# Patient Record
Sex: MALE | Race: WHITE | NOT HISPANIC OR LATINO | Employment: FULL TIME | URBAN - METROPOLITAN AREA
[De-identification: names, ages, dates, MRNs, and addresses within clinical notes are randomized per-mention and may not be internally consistent; named-entity substitution may affect disease eponyms.]

---

## 2018-06-27 ENCOUNTER — OFFICE VISIT (OUTPATIENT)
Dept: URGENT CARE | Facility: PHYSICIAN GROUP | Age: 48
End: 2018-06-27
Payer: COMMERCIAL

## 2018-06-27 ENCOUNTER — APPOINTMENT (OUTPATIENT)
Dept: RADIOLOGY | Facility: IMAGING CENTER | Age: 48
End: 2018-06-27
Attending: PHYSICIAN ASSISTANT
Payer: COMMERCIAL

## 2018-06-27 VITALS
WEIGHT: 236 LBS | BODY MASS INDEX: 31.97 KG/M2 | HEIGHT: 72 IN | TEMPERATURE: 99 F | HEART RATE: 74 BPM | SYSTOLIC BLOOD PRESSURE: 130 MMHG | DIASTOLIC BLOOD PRESSURE: 72 MMHG | RESPIRATION RATE: 18 BRPM | OXYGEN SATURATION: 94 %

## 2018-06-27 DIAGNOSIS — J18.9 COMMUNITY ACQUIRED PNEUMONIA OF RIGHT MIDDLE LOBE OF LUNG: Primary | ICD-10-CM

## 2018-06-27 DIAGNOSIS — R50.9 FEVER, UNSPECIFIED FEVER CAUSE: ICD-10-CM

## 2018-06-27 DIAGNOSIS — R05.9 COUGH: ICD-10-CM

## 2018-06-27 PROCEDURE — 71046 X-RAY EXAM CHEST 2 VIEWS: CPT | Mod: TC | Performed by: PHYSICIAN ASSISTANT

## 2018-06-27 PROCEDURE — 99204 OFFICE O/P NEW MOD 45 MIN: CPT | Performed by: PHYSICIAN ASSISTANT

## 2018-06-27 RX ORDER — GUAIFENESIN AND DEXTROMETHORPHAN HYDROBROMIDE 1200; 60 MG/1; MG/1
TABLET, EXTENDED RELEASE ORAL
COMMUNITY
End: 2021-05-14

## 2018-06-27 RX ORDER — AZITHROMYCIN 250 MG/1
TABLET, FILM COATED ORAL
Qty: 1 QUANTITY SUFFICIENT | Refills: 0 | Status: SHIPPED | OUTPATIENT
Start: 2018-06-27 | End: 2018-07-05

## 2018-06-27 RX ORDER — AMOXICILLIN 500 MG/1
1000 CAPSULE ORAL 3 TIMES DAILY
Qty: 42 CAP | Refills: 0 | Status: SHIPPED | OUTPATIENT
Start: 2018-06-27 | End: 2018-07-04

## 2018-06-27 ASSESSMENT — ENCOUNTER SYMPTOMS: COUGH: 1

## 2018-06-27 NOTE — PROGRESS NOTES
Subjective:      Ross Blake is a 47 y.o. male who presents with Cough (congestion, X3 weeks )    PMH: Reviewed with patient/family member/EPIC.   MEDS:   Current Outpatient Prescriptions:   •  Dextromethorphan-Guaifenesin (MUCINEX DM MAXIMUM STRENGTH)  MG TABLET SR 12 HR, Take  by mouth., Disp: , Rfl:   ALLERGIES: No Known Allergies  SURGHX: No past surgical history on file.  SOCHX:  reports that he has never smoked. He has never used smokeless tobacco.  FH: Reviewed with patient/family. Not pertinent to this complaint.            Pt co cough x 3 weeks that started as a dry irritating cough, now becoming productive, keeping him awake at night.  Pt states the right side of his chest/back hurt, feels like he strained something coughing.       Cough   This is a new problem. Episode onset: 3 weeks. The problem has been gradually worsening. The problem occurs every few minutes. The cough is productive of sputum and productive of purulent sputum. Associated symptoms include chills, headaches and shortness of breath. Pertinent negatives include no fever, hemoptysis, myalgias, nasal congestion, postnasal drip, rhinorrhea, sore throat or wheezing. The symptoms are aggravated by lying down. He has tried OTC cough suppressant, rest and body position changes for the symptoms. The treatment provided no relief. There is no history of asthma, bronchitis, environmental allergies or pneumonia.       Review of Systems   Constitutional: Positive for chills and malaise/fatigue. Negative for fever.   HENT: Negative for postnasal drip, rhinorrhea and sore throat.    Respiratory: Positive for cough, sputum production and shortness of breath. Negative for hemoptysis and wheezing.    Musculoskeletal: Negative for myalgias.   Neurological: Positive for headaches. Negative for dizziness.   Endo/Heme/Allergies: Negative for environmental allergies.   All other systems reviewed and are negative.         Objective:     /72    Pulse 74   Temp 37.2 °C (99 °F)   Resp 18   Ht 1.829 m (6')   Wt 107 kg (236 lb)   SpO2 94%   BMI 32.01 kg/m²      Physical Exam   Constitutional: He is oriented to person, place, and time. He appears well-developed and well-nourished. No distress.   HENT:   Head: Normocephalic and atraumatic.   Eyes: EOM are normal. Pupils are equal, round, and reactive to light.   Neck: Normal range of motion. Neck supple.   Cardiovascular: Normal rate.    Pulmonary/Chest: He has decreased breath sounds in the right lower field. He has no wheezes. He has rhonchi. He has rales in the right lower field.   Abdominal: Soft.   Musculoskeletal: Normal range of motion.   Neurological: He is alert and oriented to person, place, and time.   Skin: Skin is warm and dry.   Psychiatric: He has a normal mood and affect.   Nursing note and vitals reviewed.          Xray images viewed and interpreted by me, confirmed by radiology:      Impression         Airspace opacity in the right middle lobe, in keeping with pneumonia.   Reading Provider Reading Date   Jose Daniel Whiteside M.D. Jun 27, 2018   Signing Provider Signing Date Signing Time   Jose Daniel Whiteside M.D. Jun 27, 2018  3:04 PM          Assessment/Plan:     1. Community acquired pneumonia of right middle lobe of lung (HCC)  DX-CHEST-2 VIEWS    amoxicillin (AMOXIL) 500 MG Cap    azithromycin (ZITHROMAX) 250 MG Tab   2. Cough  DX-CHEST-2 VIEWS    amoxicillin (AMOXIL) 500 MG Cap    azithromycin (ZITHROMAX) 250 MG Tab   3. Fever, unspecified fever cause  DX-CHEST-2 VIEWS    amoxicillin (AMOXIL) 500 MG Cap    azithromycin (ZITHROMAX) 250 MG Tab     PT can continue OTC medications, increase fluids and rest until symptoms improve. PT declined Rx cough medicine at this time.     PT should follow up with PCP in 1-2 days for re-evaluation if symptoms have not improved.      Discussed red flags and reasons to return to UC or ED.  Pt and/or family verbalized understanding of diagnosis and follow up  instructions and was offered informational handout on diagnosis.  PT discharged.

## 2018-06-30 ASSESSMENT — ENCOUNTER SYMPTOMS
CHILLS: 1
FEVER: 0
MYALGIAS: 0
HEADACHES: 1
SORE THROAT: 0
HEMOPTYSIS: 0
DIZZINESS: 0
RHINORRHEA: 0
WHEEZING: 0
SPUTUM PRODUCTION: 1
SHORTNESS OF BREATH: 1

## 2018-07-05 ENCOUNTER — APPOINTMENT (OUTPATIENT)
Dept: RADIOLOGY | Facility: IMAGING CENTER | Age: 48
End: 2018-07-05
Attending: PHYSICIAN ASSISTANT
Payer: COMMERCIAL

## 2018-07-05 ENCOUNTER — OFFICE VISIT (OUTPATIENT)
Dept: URGENT CARE | Facility: PHYSICIAN GROUP | Age: 48
End: 2018-07-05
Payer: COMMERCIAL

## 2018-07-05 VITALS
HEART RATE: 100 BPM | WEIGHT: 240 LBS | DIASTOLIC BLOOD PRESSURE: 82 MMHG | SYSTOLIC BLOOD PRESSURE: 118 MMHG | BODY MASS INDEX: 32.55 KG/M2 | OXYGEN SATURATION: 94 % | TEMPERATURE: 99.6 F

## 2018-07-05 DIAGNOSIS — E66.9 OBESITY (BMI 30-39.9): ICD-10-CM

## 2018-07-05 DIAGNOSIS — J18.9 PNEUMONIA OF RIGHT MIDDLE LOBE DUE TO INFECTIOUS ORGANISM: Primary | ICD-10-CM

## 2018-07-05 DIAGNOSIS — J18.9 PNEUMONIA OF RIGHT MIDDLE LOBE DUE TO INFECTIOUS ORGANISM: ICD-10-CM

## 2018-07-05 PROCEDURE — 94760 N-INVAS EAR/PLS OXIMETRY 1: CPT | Mod: 59 | Performed by: PHYSICIAN ASSISTANT

## 2018-07-05 PROCEDURE — 94640 AIRWAY INHALATION TREATMENT: CPT | Performed by: PHYSICIAN ASSISTANT

## 2018-07-05 PROCEDURE — 99214 OFFICE O/P EST MOD 30 MIN: CPT | Mod: 25 | Performed by: PHYSICIAN ASSISTANT

## 2018-07-05 PROCEDURE — 71046 X-RAY EXAM CHEST 2 VIEWS: CPT | Mod: TC | Performed by: PHYSICIAN ASSISTANT

## 2018-07-05 RX ORDER — LEVOFLOXACIN 500 MG/1
500 TABLET, FILM COATED ORAL DAILY
Qty: 10 TAB | Refills: 0 | Status: SHIPPED | OUTPATIENT
Start: 2018-07-05 | End: 2018-07-15

## 2018-07-05 RX ORDER — PROMETHAZINE HYDROCHLORIDE AND CODEINE PHOSPHATE 6.25; 1 MG/5ML; MG/5ML
5 SYRUP ORAL 4 TIMES DAILY PRN
Qty: 240 ML | Refills: 0 | Status: SHIPPED | OUTPATIENT
Start: 2018-07-05 | End: 2018-07-19

## 2018-07-05 RX ORDER — ALBUTEROL SULFATE 90 UG/1
2 AEROSOL, METERED RESPIRATORY (INHALATION) EVERY 6 HOURS PRN
Qty: 8.5 G | Refills: 0 | Status: SHIPPED | OUTPATIENT
Start: 2018-07-05 | End: 2018-07-15

## 2018-07-05 RX ORDER — IPRATROPIUM BROMIDE AND ALBUTEROL SULFATE 2.5; .5 MG/3ML; MG/3ML
3 SOLUTION RESPIRATORY (INHALATION) ONCE
Status: COMPLETED | OUTPATIENT
Start: 2018-07-05 | End: 2018-07-05

## 2018-07-05 RX ORDER — PREDNISONE 20 MG/1
TABLET ORAL
Qty: 23 TAB | Refills: 0 | Status: ON HOLD | OUTPATIENT
Start: 2018-07-05 | End: 2021-05-20

## 2018-07-05 RX ADMIN — IPRATROPIUM BROMIDE AND ALBUTEROL SULFATE 3 ML: 2.5; .5 SOLUTION RESPIRATORY (INHALATION) at 14:10

## 2018-07-05 NOTE — PROGRESS NOTES
Subjective:      Pt is a 47 y.o. male who presents with Pneumonia (dx of pneumonia x 06/27.. not feeling better, productive cough, painful cough )            HPI  PT presents to  clinic today, 8 days after being diagnosed with right middle lobe pneumonia and placed on a ZPACK. Pt still complaining of sore throat, pressure in ears, cough, fatigue, runny nose, wheezing and SOB. PT denies CP, NVD, abdominal pain, joint pain. PT states these symptoms began around 10 days ago. PT states the pain is a 7/10 from ocughing, aching in nature and worse at night.  The pt's medication list, problem list, and allergies have been evaluated and reviewed during today's visit.    PMH:  Negative per pt.      PSH:  Negative per pt.      Fam Hx:  the patient's family history is not pertinent to their current complaint    Soc HX:  Social History     Social History   • Marital status:      Spouse name: N/A   • Number of children: N/A   • Years of education: N/A     Occupational History   • Not on file.     Social History Main Topics   • Smoking status: Never Smoker   • Smokeless tobacco: Never Used   • Alcohol use Yes      Comment: occ   • Drug use: No   • Sexual activity: Not on file     Other Topics Concern   • Not on file     Social History Narrative   • No narrative on file         Medications:    Current Outpatient Prescriptions:   •  albuterol 108 (90 Base) MCG/ACT Aero Soln inhalation aerosol, Inhale 2 Puffs by mouth every 6 hours as needed for Shortness of Breath for up to 10 days., Disp: 8.5 g, Rfl: 0  •  levoFLOXacin (LEVAQUIN) 500 MG tablet, Take 1 Tab by mouth every day for 10 days., Disp: 10 Tab, Rfl: 0  •  predniSONE (DELTASONE) 20 MG Tab, Take 3 tabs at once PO daily x 5 days, then take 2 tabs at once daily x 3 days, then take 1 tab PO daily x 2 days, Disp: 23 Tab, Rfl: 0  •  promethazine-codeine (PHENERGAN-CODEINE) 6.25-10 MG/5ML Syrup, Take 5 mL by mouth 4 times a day as needed for Cough for up to 14 days., Disp:  240 mL, Rfl: 0  •  Dextromethorphan-Guaifenesin (MUCINEX DM MAXIMUM STRENGTH)  MG TABLET SR 12 HR, Take  by mouth., Disp: , Rfl:       Allergies:  Patient has no known allergies.    ROS    Review of Systems   Constitutional: Positive for chills and malaise/fatigue. Negative for fever and diaphoresis.   HENT: Positive for congestion, ear pain and sore throat. Negative for ear discharge, hearing loss, nosebleeds and tinnitus.    Eyes: Negative for blurred vision, double vision and photophobia.   Respiratory: Positive for cough, sputum production, shortness of breath and wheezing. Negative for hemoptysis.    Cardiovascular: Negative for chest pain and palpitations.   Gastrointestinal: Negative for nausea, vomiting, abdominal pain, diarrhea and constipation.   Genitourinary: Negative for dysuria and flank pain.   Musculoskeletal: Negative for joint pain and myalgias.   Skin: Negative for itching and rash.   Neurological: Positive for headaches. Negative for dizziness, tingling and weakness.   Endo/Heme/Allergies: Does not bruise/bleed easily.   Psychiatric/Behavioral: Negative for depression. The patient is not nervous/anxious.         Objective:     /82   Pulse 100   Temp 37.6 °C (99.6 °F)   Wt 108.9 kg (240 lb)   SpO2 94%   BMI 32.55 kg/m²      Physical Exam       Physical Exam   Constitutional: PT is oriented to person, place, and time. PT appears well-developed and well-nourished. No distress.   HENT:   Head: Normocephalic and atraumatic.   Right Ear: Hearing, tympanic membrane, external ear and ear canal normal.   Left Ear: Hearing, tympanic membrane, external ear and ear canal normal.   Nose: Mucosal edema, rhinorrhea and sinus tenderness present. Right sinus exhibits frontal sinus tenderness. Left sinus exhibits frontal sinus tenderness.   Mouth/Throat: Uvula is midline. Mucous membranes are pale. Posterior oropharyngeal edema and posterior oropharyngeal erythema present. No oropharyngeal  exudate.   Eyes: Conjunctivae normal and EOM are normal. Pupils are equal, round, and reactive to light. Right eye exhibits no discharge. Left eye exhibits no discharge.   Neck: Normal range of motion. Neck supple. No thyromegaly present.   Cardiovascular: Normal rate, regular rhythm, normal heart sounds and intact distal pulses.  Exam reveals no gallop and no friction rub.    No murmur heard.  Pulmonary/Chest: Effort normal. No respiratory distress. PT has wheezes. PT has no rales. PT exhibits tenderness.   Abdominal: Soft. Bowel sounds are normal. PT exhibits no distension and no mass. There is no tenderness. There is no rebound and no guarding.   Musculoskeletal: Normal range of motion. PT exhibits no edema and no tenderness.   Lymphadenopathy:     PT has no cervical adenopathy.   Neurological: Pt is alert and oriented to person, place, and time. Pt has normal reflexes. No cranial nerve deficit.   Skin: Skin is warm and dry. No rash noted. No erythema.   Psychiatric: PT has a normal mood and affect. Pt behavior is normal. Judgment and thought content normal.     RADS:  Narrative       7/5/2018 1:56 PM    HISTORY/REASON FOR EXAM:  Cough    TECHNIQUE/EXAM DESCRIPTION:  PA and lateral views of the chest.    COMPARISON:  6/27/2018    FINDINGS:    Mild patchy right middle lobe opacity is again noted. There is a pectus excavatum. The cardiomediastinal silhouette is stable. No pleural effusion or pneumothorax is seen. Costophrenic angles are sharp.       Impression       Patchy right middle lobe opacity may represent atelectasis or pneumonitis.    Pectus excavatum deformity.   Reading Provider Reading Date   Dino Crane M.D. Jul 5, 2018   Signing Provider Signing Date Signing Time   Dino Crane M.D.            Assessment/Plan:     1. Pneumonia of right middle lobe due to infectious organism (HCC)    - DX-CHEST-2 VIEWS; Future  - ipratropium-albuterol (DUONEB) nebulizer solution; 3 mL by Nebulization route  Once.  - albuterol 108 (90 Base) MCG/ACT Aero Soln inhalation aerosol; Inhale 2 Puffs by mouth every 6 hours as needed for Shortness of Breath for up to 10 days.  Dispense: 8.5 g; Refill: 0  - levoFLOXacin (LEVAQUIN) 500 MG tablet; Take 1 Tab by mouth every day for 10 days.  Dispense: 10 Tab; Refill: 0  - predniSONE (DELTASONE) 20 MG Tab; Take 3 tabs at once PO daily x 5 days, then take 2 tabs at once daily x 3 days, then take 1 tab PO daily x 2 days  Dispense: 23 Tab; Refill: 0  - promethazine-codeine (PHENERGAN-CODEINE) 6.25-10 MG/5ML Syrup; Take 5 mL by mouth 4 times a day as needed for Cough for up to 14 days.  Dispense: 240 mL; Refill: 0    2. Obesity (BMI 30-39.9)    - Patient identified as having weight management issue.  Appropriate orders and counseling given.    STRICT ER precautions discussed  Rest, fluids encouraged.  OTC decongestant for congestion/cough  AVS with medical info given.  Pt was in full understanding and agreement with the plan.  Follow-up as needed if symptoms worsen or fail to improve.

## 2020-06-21 ENCOUNTER — HOSPITAL ENCOUNTER (EMERGENCY)
Facility: MEDICAL CENTER | Age: 50
End: 2020-06-21
Attending: EMERGENCY MEDICINE
Payer: COMMERCIAL

## 2020-06-21 VITALS
RESPIRATION RATE: 18 BRPM | HEART RATE: 88 BPM | HEIGHT: 72 IN | TEMPERATURE: 99.1 F | BODY MASS INDEX: 33.8 KG/M2 | OXYGEN SATURATION: 95 % | SYSTOLIC BLOOD PRESSURE: 132 MMHG | DIASTOLIC BLOOD PRESSURE: 79 MMHG | WEIGHT: 249.56 LBS

## 2020-06-21 DIAGNOSIS — R11.0 NAUSEA: ICD-10-CM

## 2020-06-21 DIAGNOSIS — I44.4 LAFB (LEFT ANTERIOR FASCICULAR BLOCK): ICD-10-CM

## 2020-06-21 DIAGNOSIS — I45.10 RBBB: ICD-10-CM

## 2020-06-21 DIAGNOSIS — R74.8 ELEVATED LIVER ENZYMES: ICD-10-CM

## 2020-06-21 DIAGNOSIS — E86.0 DEHYDRATION: ICD-10-CM

## 2020-06-21 LAB
ALBUMIN SERPL BCP-MCNC: 4.5 G/DL (ref 3.2–4.9)
ALBUMIN/GLOB SERPL: 1.5 G/DL
ALP SERPL-CCNC: 90 U/L (ref 30–99)
ALT SERPL-CCNC: 95 U/L (ref 2–50)
ANION GAP SERPL CALC-SCNC: 16 MMOL/L (ref 7–16)
AST SERPL-CCNC: 75 U/L (ref 12–45)
BASOPHILS # BLD AUTO: 0.8 % (ref 0–1.8)
BASOPHILS # BLD: 0.07 K/UL (ref 0–0.12)
BILIRUB SERPL-MCNC: 0.9 MG/DL (ref 0.1–1.5)
BUN SERPL-MCNC: 17 MG/DL (ref 8–22)
CALCIUM SERPL-MCNC: 9.4 MG/DL (ref 8.5–10.5)
CHLORIDE SERPL-SCNC: 98 MMOL/L (ref 96–112)
CO2 SERPL-SCNC: 23 MMOL/L (ref 20–33)
CREAT SERPL-MCNC: 0.94 MG/DL (ref 0.5–1.4)
EOSINOPHIL # BLD AUTO: 0.1 K/UL (ref 0–0.51)
EOSINOPHIL NFR BLD: 1.1 % (ref 0–6.9)
ERYTHROCYTE [DISTWIDTH] IN BLOOD BY AUTOMATED COUNT: 48.9 FL (ref 35.9–50)
GLOBULIN SER CALC-MCNC: 3 G/DL (ref 1.9–3.5)
GLUCOSE SERPL-MCNC: 98 MG/DL (ref 65–99)
HCT VFR BLD AUTO: 50.5 % (ref 42–52)
HGB BLD-MCNC: 16.9 G/DL (ref 14–18)
IMM GRANULOCYTES # BLD AUTO: 0.02 K/UL (ref 0–0.11)
IMM GRANULOCYTES NFR BLD AUTO: 0.2 % (ref 0–0.9)
LIPASE SERPL-CCNC: 48 U/L (ref 11–82)
LYMPHOCYTES # BLD AUTO: 0.68 K/UL (ref 1–4.8)
LYMPHOCYTES NFR BLD: 7.7 % (ref 22–41)
MCH RBC QN AUTO: 33.5 PG (ref 27–33)
MCHC RBC AUTO-ENTMCNC: 33.5 G/DL (ref 33.7–35.3)
MCV RBC AUTO: 100 FL (ref 81.4–97.8)
MONOCYTES # BLD AUTO: 0.92 K/UL (ref 0–0.85)
MONOCYTES NFR BLD AUTO: 10.4 % (ref 0–13.4)
NEUTROPHILS # BLD AUTO: 7.07 K/UL (ref 1.82–7.42)
NEUTROPHILS NFR BLD: 79.8 % (ref 44–72)
NRBC # BLD AUTO: 0 K/UL
NRBC BLD-RTO: 0 /100 WBC
PLATELET # BLD AUTO: 204 K/UL (ref 164–446)
PMV BLD AUTO: 9.2 FL (ref 9–12.9)
POTASSIUM SERPL-SCNC: 4 MMOL/L (ref 3.6–5.5)
PROT SERPL-MCNC: 7.5 G/DL (ref 6–8.2)
RBC # BLD AUTO: 5.05 M/UL (ref 4.7–6.1)
SODIUM SERPL-SCNC: 137 MMOL/L (ref 135–145)
TROPONIN T SERPL-MCNC: 8 NG/L (ref 6–19)
WBC # BLD AUTO: 8.9 K/UL (ref 4.8–10.8)

## 2020-06-21 PROCEDURE — 80053 COMPREHEN METABOLIC PANEL: CPT

## 2020-06-21 PROCEDURE — 84484 ASSAY OF TROPONIN QUANT: CPT

## 2020-06-21 PROCEDURE — 93005 ELECTROCARDIOGRAM TRACING: CPT | Performed by: EMERGENCY MEDICINE

## 2020-06-21 PROCEDURE — 99284 EMERGENCY DEPT VISIT MOD MDM: CPT

## 2020-06-21 PROCEDURE — 96374 THER/PROPH/DIAG INJ IV PUSH: CPT

## 2020-06-21 PROCEDURE — 700111 HCHG RX REV CODE 636 W/ 250 OVERRIDE (IP): Performed by: EMERGENCY MEDICINE

## 2020-06-21 PROCEDURE — 83690 ASSAY OF LIPASE: CPT

## 2020-06-21 PROCEDURE — 85025 COMPLETE CBC W/AUTO DIFF WBC: CPT

## 2020-06-21 PROCEDURE — 36415 COLL VENOUS BLD VENIPUNCTURE: CPT

## 2020-06-21 PROCEDURE — 700105 HCHG RX REV CODE 258: Performed by: EMERGENCY MEDICINE

## 2020-06-21 RX ORDER — SODIUM CHLORIDE 9 MG/ML
2000 INJECTION, SOLUTION INTRAVENOUS ONCE
Status: COMPLETED | OUTPATIENT
Start: 2020-06-21 | End: 2020-06-21

## 2020-06-21 RX ORDER — ONDANSETRON 2 MG/ML
4 INJECTION INTRAMUSCULAR; INTRAVENOUS ONCE
Status: COMPLETED | OUTPATIENT
Start: 2020-06-21 | End: 2020-06-21

## 2020-06-21 RX ADMIN — SODIUM CHLORIDE 2000 ML: 9 INJECTION, SOLUTION INTRAVENOUS at 17:44

## 2020-06-21 RX ADMIN — ONDANSETRON 4 MG: 2 INJECTION INTRAMUSCULAR; INTRAVENOUS at 18:03

## 2020-06-21 ASSESSMENT — ENCOUNTER SYMPTOMS
ABDOMINAL PAIN: 0
DIARRHEA: 0
VOMITING: 0
NAUSEA: 1

## 2020-06-22 LAB — EKG IMPRESSION: NORMAL

## 2020-06-22 ASSESSMENT — ENCOUNTER SYMPTOMS
FEVER: 0
CHILLS: 0
HEADACHES: 0
DIZZINESS: 0
BLURRED VISION: 0
SHORTNESS OF BREATH: 0

## 2020-06-22 NOTE — DISCHARGE INSTRUCTIONS
You are noted to have a mildly abnormal EKG which needs to be followed up by a cardiologist, we have left a message with  who should contact you to schedule follow-up appointment

## 2020-06-22 NOTE — ED NOTES
Patient ambulatory from triage with spouse. Pt reports feeling weak, fatigued x1 day. Pt has spent large amount of time outside, in the sun, the last 2 days. Pt states feeling like the drank enough fluid. Pt reports nausea. Denies PMH or daily medications. Denies dizziness, vomiting, diarrhea, fever, chills, CP, SOB. Pt BP and HR elevated on arrival to room.

## 2020-06-22 NOTE — ED NOTES
Discharge education provided. Discharge paperwork signed and given to pt. Awaiting completion of IVF prior to discharge.

## 2020-06-22 NOTE — ED TRIAGE NOTES
"Chief Complaint   Patient presents with   • Nausea   pt denies any V/D states in sun long hours yesterday pt reports \"just not feeling right\" pt is A&O NAD, generalized weakness. Pt denies CP SOB or dizziness. Pt is aware of triage process he is wearing mask.   "

## 2020-06-22 NOTE — ED PROVIDER NOTES
"ED Provider Note    Scribed for Krista Benites M.D. by Benji Montoya. 6/21/2020, 5:30 PM.    Primary care provider: Markie Scott M.D.  Means of arrival: walk-in  History obtained from: patient  History limited by: none    CHIEF COMPLAINT  Chief Complaint   Patient presents with   • Nausea       HPI  Ross Blake is a 49 y.o. male who presents to the Emergency Department with complaints of mild, intermittent nausea that acute onset this morning. He reports that he was out in the sun for several hours yesterday in [place] and then went camping yesterday afternoon.  He was drinking some alcohol, however he thinks he drank enough water and Gatorade while he was out to stay hydrated. He denies any abdominal pain, vomiting, diarrhea, or chest pain. He states he overall just \"feels-off\", his nausea from this morning has subsequently resolved . No recent sick contact. He smokes marijuana about once or twice a day a year. He drinks alcohol on occasion.     PPE Note: I personally donned full PPE for all patient encounters during this visit, including wearing an N95 respirator mask, gloves, and eye protection. Scribe remained outside the patient's room and did not have any contact with the patient for the duration of patient encounter.      REVIEW OF SYSTEMS  Review of Systems   Constitutional: Negative for chills and fever.   Eyes: Negative for blurred vision.   Respiratory: Negative for shortness of breath.    Cardiovascular: Negative for chest pain.   Gastrointestinal: Positive for nausea. Negative for abdominal pain, diarrhea and vomiting.   Neurological: Negative for dizziness and headaches.   All other systems reviewed and are negative.    PAST MEDICAL HISTORY  None noted when reviewed.     SURGICAL HISTORY  patient denies any surgical history    SOCIAL HISTORY  Social History     Tobacco Use   • Smoking status: Current Every Day Smoker     Types: Cigarettes   • Smokeless tobacco: Never Used   Substance " Use Topics   • Alcohol use: Yes     Comment: occ   • Drug use: No      Social History     Substance and Sexual Activity   Drug Use No       FAMILY HISTORY  History reviewed. No pertinent family history.    CURRENT MEDICATIONS  Home Medications     Reviewed by Chani Chaudhry R.N. (Registered Nurse) on 06/21/20 at 1707  Med List Status: Not Addressed   Medication Last Dose Status   Dextromethorphan-Guaifenesin (MUCINEX DM MAXIMUM STRENGTH)  MG TABLET SR 12 HR  Active   predniSONE (DELTASONE) 20 MG Tab  Active                ALLERGIES  No Known Allergies    PHYSICAL EXAM  VITAL SIGNS: BP (!) 177/109   Pulse (!) 114   Temp 37.3 °C (99.1 °F) (Temporal)   Resp 18   Ht 1.829 m (6')   Wt 113.2 kg (249 lb 9 oz)   SpO2 98%   BMI 33.85 kg/m²   Vitals reviewed by myself.  Physical Exam  Nursing note and vitals reviewed.  Constitutional: Well-developed and well-nourished. No acute distress.   HENT: Head is normocephalic and atraumatic. Dry mucous membranes.  Eyes: extra-ocular movements intact  Cardiovascular: Tachcyardic rate and regular rhythm. No murmur heard.  Pulmonary/Chest: Breath sounds normal. No wheezes or rales.   Abdominal: Soft and non-tender. No distention.    Musculoskeletal: Extremities exhibit normal range of motion without edema or tenderness.   Neurological: Awake and alert  Skin: Skin is warm and dry. No rash.     DIAGNOSTIC STUDIES /  LABS  Labs Reviewed   CBC WITH DIFFERENTIAL - Abnormal; Notable for the following components:       Result Value    .0 (*)     MCH 33.5 (*)     MCHC 33.5 (*)     Neutrophils-Polys 79.80 (*)     Lymphocytes 7.70 (*)     Lymphs (Absolute) 0.68 (*)     Monos (Absolute) 0.92 (*)     All other components within normal limits   COMP METABOLIC PANEL - Abnormal; Notable for the following components:    AST(SGOT) 75 (*)     ALT(SGPT) 95 (*)     All other components within normal limits   LIPASE   TROPONIN   ESTIMATED GFR       All labs reviewed by me.    EKG  Interpretation:  Interpreted by myself    12 Lead EKG interpreted by me to show:  EKG at 1752: Normal sinus rhythm, heart rate 86, left axis deviation, intervals notable for mildly prolonged QRS of 112 with associated incomplete right bundle branch block and left anterior fascicular block, , QTc 484, no acute ST-T segment changes, no evidence of acute arrhythmia or ischemia, no prior EKG for comparison  My impression of this EKG: Does not indicate ischemia or arrhythmia at this time.    REASSESSMENT  5:31 PM Patient presents to the ED with complaints of nausea. Patient will be treated with Zofran for nausea and IV fluids for tachycardia and presumed dehyration. Ordered for labs and an EKG to evaluate his symptoms.    7:09 PM - Patient was reevaluated at bedside. Discussed lab and EKG results with the patient. He reports that his nausea has resolved. His tachycardia has also resolved with his heart rate in the 80's. Patient understands the plan to follow-up with cardiology in regards to the RBBB and LAFB. Advised patient to stay well-hydrated.    HYDRATION: Based on the patient's presentation of Dehydration and Tachycardia the patient was given IV fluids. IV Hydration was used because oral hydration was not adequate alone. Upon recheck following hydration, the patient was improved.    COURSE & MEDICAL DECISION MAKING  Nursing notes, VS, PMSFHx reviewed in chart.    Patient is a 49-year-old male who comes in for evaluation of generally feeling unwell.  Differential diagnosis includes dehydration, electrolyte disturbance, viral illness.  Diagnostic work-up includes labs.  I will also obtain a screening EKG and troponin to assess for atypical presentation of acute coronary syndrome in this 49-year-old male.    Patient's initial vitals notable for tachycardia, clinically he appears dehydrated therefore he is treated with IV fluids after which he feels greatly improved.  EKG returns and is notable for a incomplete  right bundle branch block and left anterior fascicular block, there is no prior EKG for comparison.  No evidence of acute ischemia.  Labs returned and are unremarkable.  LFTs are mildly elevated likely secondary to patient's drinking yesterday otherwise unremarkable.  Upon reassessment patient is feeling improved and tachycardia has resolved after hydration.  Therefore he is reassured and advised on the findings of his bundle branch block on EKG and mildly elevated LFTs.  I advised him given the bundle branch block and generally feeling unwell but he will need to follow-up with cardiology, to which he is agreeable.  Message was left with  to help obtain follow-up.  I also advised patient that his LFTs will need to be repeated in order to ensure they improved.  I advised patient on the importance of hydration and rest and have given him strict return precautions.  Patient is then discharged in stable condition.    The patient will return for new or worsening symptoms and is stable at the time of discharge.    The patient is referred to a primary physician for blood pressure management, diabetic screening, and for all other preventative health concerns.    DISPOSITION:  Patient will be discharged home in stable condition.    FOLLOW UP:  Ean Macario M.D.  [address] [address]  [phone]            FINAL IMPRESSION  1. Nausea    2. Dehydration    3. RBBB    4. LAFB (left anterior fascicular block)    5. Elevated liver enzymes          Benji GREER (Scribe), am scribing for, and in the presence of, Krista Benites M.D..    Electronically signed by: Benji Montoya (Toe), 6/21/2020    Krista GREER M.D. personally performed the services described in this documentation, as scribed by Benji Montoya in my presence, and it is both accurate and complete.    The note accurately reflects work and decisions made by me.  Krista Benites M.D.  6/22/2020  12:42 AM

## 2020-06-29 ENCOUNTER — PATIENT OUTREACH (OUTPATIENT)
Dept: HEALTH INFORMATION MANAGEMENT | Facility: OTHER | Age: 50
End: 2020-06-29

## 2020-12-24 ENCOUNTER — HOSPITAL ENCOUNTER (OUTPATIENT)
Dept: LAB | Facility: MEDICAL CENTER | Age: 50
End: 2020-12-24
Attending: FAMILY MEDICINE
Payer: COMMERCIAL

## 2020-12-24 PROCEDURE — U0003 INFECTIOUS AGENT DETECTION BY NUCLEIC ACID (DNA OR RNA); SEVERE ACUTE RESPIRATORY SYNDROME CORONAVIRUS 2 (SARS-COV-2) (CORONAVIRUS DISEASE [COVID-19]), AMPLIFIED PROBE TECHNIQUE, MAKING USE OF HIGH THROUGHPUT TECHNOLOGIES AS DESCRIBED BY CMS-2020-01-R: HCPCS

## 2020-12-25 LAB
COVID ORDER STATUS COVID19: NORMAL
SARS-COV-2 RNA RESP QL NAA+PROBE: NOTDETECTED
SPECIMEN SOURCE: NORMAL

## 2021-03-15 ENCOUNTER — HOSPITAL ENCOUNTER (OUTPATIENT)
Dept: LAB | Facility: MEDICAL CENTER | Age: 51
End: 2021-03-15
Attending: NURSE PRACTITIONER
Payer: COMMERCIAL

## 2021-03-15 LAB
ALBUMIN SERPL BCP-MCNC: 3.7 G/DL (ref 3.2–4.9)
ALBUMIN/GLOB SERPL: 1.3 G/DL
ALP SERPL-CCNC: 104 U/L (ref 30–99)
ALT SERPL-CCNC: 239 U/L (ref 2–50)
ANION GAP SERPL CALC-SCNC: 14 MMOL/L (ref 7–16)
AST SERPL-CCNC: 268 U/L (ref 12–45)
BASOPHILS # BLD AUTO: 2.3 % (ref 0–1.8)
BASOPHILS # BLD: 0.12 K/UL (ref 0–0.12)
BILIRUB SERPL-MCNC: 0.6 MG/DL (ref 0.1–1.5)
BUN SERPL-MCNC: 9 MG/DL (ref 8–22)
CALCIUM SERPL-MCNC: 8.7 MG/DL (ref 8.5–10.5)
CHLORIDE SERPL-SCNC: 102 MMOL/L (ref 96–112)
CO2 SERPL-SCNC: 24 MMOL/L (ref 20–33)
CREAT SERPL-MCNC: 0.73 MG/DL (ref 0.5–1.4)
EOSINOPHIL # BLD AUTO: 0.28 K/UL (ref 0–0.51)
EOSINOPHIL NFR BLD: 5.5 % (ref 0–6.9)
ERYTHROCYTE [DISTWIDTH] IN BLOOD BY AUTOMATED COUNT: 54.2 FL (ref 35.9–50)
FASTING STATUS PATIENT QL REPORTED: NORMAL
FOLATE SERPL-MCNC: 14.3 NG/ML
GLOBULIN SER CALC-MCNC: 2.8 G/DL (ref 1.9–3.5)
GLUCOSE SERPL-MCNC: 98 MG/DL (ref 65–99)
HCT VFR BLD AUTO: 49.2 % (ref 42–52)
HGB BLD-MCNC: 16.5 G/DL (ref 14–18)
IMM GRANULOCYTES # BLD AUTO: 0.02 K/UL (ref 0–0.11)
IMM GRANULOCYTES NFR BLD AUTO: 0.4 % (ref 0–0.9)
LYMPHOCYTES # BLD AUTO: 0.6 K/UL (ref 1–4.8)
LYMPHOCYTES NFR BLD: 11.7 % (ref 22–41)
MCH RBC QN AUTO: 34.1 PG (ref 27–33)
MCHC RBC AUTO-ENTMCNC: 33.5 G/DL (ref 33.7–35.3)
MCV RBC AUTO: 101.7 FL (ref 81.4–97.8)
MONOCYTES # BLD AUTO: 0.86 K/UL (ref 0–0.85)
MONOCYTES NFR BLD AUTO: 16.8 % (ref 0–13.4)
NEUTROPHILS # BLD AUTO: 3.23 K/UL (ref 1.82–7.42)
NEUTROPHILS NFR BLD: 63.3 % (ref 44–72)
NRBC # BLD AUTO: 0 K/UL
NRBC BLD-RTO: 0 /100 WBC
PLATELET # BLD AUTO: 130 K/UL (ref 164–446)
PMV BLD AUTO: 10 FL (ref 9–12.9)
POTASSIUM SERPL-SCNC: 3.5 MMOL/L (ref 3.6–5.5)
PROT SERPL-MCNC: 6.5 G/DL (ref 6–8.2)
PSA SERPL-MCNC: 2.81 NG/ML (ref 0–4)
RBC # BLD AUTO: 4.84 M/UL (ref 4.7–6.1)
SODIUM SERPL-SCNC: 140 MMOL/L (ref 135–145)
T3FREE SERPL-MCNC: 3.76 PG/ML (ref 2–4.4)
T4 FREE SERPL-MCNC: 1.26 NG/DL (ref 0.93–1.7)
TSH SERPL DL<=0.005 MIU/L-ACNC: 3.82 UIU/ML (ref 0.38–5.33)
VIT B12 SERPL-MCNC: 1269 PG/ML (ref 211–911)
WBC # BLD AUTO: 5.1 K/UL (ref 4.8–10.8)

## 2021-03-15 PROCEDURE — 84153 ASSAY OF PSA TOTAL: CPT

## 2021-03-15 PROCEDURE — 82607 VITAMIN B-12: CPT

## 2021-03-15 PROCEDURE — 85025 COMPLETE CBC W/AUTO DIFF WBC: CPT

## 2021-03-15 PROCEDURE — 84443 ASSAY THYROID STIM HORMONE: CPT

## 2021-03-15 PROCEDURE — 84270 ASSAY OF SEX HORMONE GLOBUL: CPT

## 2021-03-15 PROCEDURE — 82746 ASSAY OF FOLIC ACID SERUM: CPT

## 2021-03-15 PROCEDURE — 36415 COLL VENOUS BLD VENIPUNCTURE: CPT

## 2021-03-15 PROCEDURE — 84403 ASSAY OF TOTAL TESTOSTERONE: CPT

## 2021-03-15 PROCEDURE — 86800 THYROGLOBULIN ANTIBODY: CPT

## 2021-03-15 PROCEDURE — 80053 COMPREHEN METABOLIC PANEL: CPT

## 2021-03-15 PROCEDURE — 84481 FREE ASSAY (FT-3): CPT

## 2021-03-15 PROCEDURE — 84402 ASSAY OF FREE TESTOSTERONE: CPT

## 2021-03-15 PROCEDURE — 84439 ASSAY OF FREE THYROXINE: CPT

## 2021-03-16 LAB — THYROGLOB AB SERPL-ACNC: <0.9 IU/ML (ref 0–4)

## 2021-03-17 LAB
SHBG SERPL-SCNC: 45 NMOL/L (ref 11–80)
TESTOST FREE MFR SERPL: 1.5 % (ref 1.6–2.9)
TESTOST FREE SERPL-MCNC: 31 PG/ML (ref 47–244)
TESTOST SERPL-MCNC: 211 NG/DL (ref 300–890)

## 2021-05-14 ENCOUNTER — APPOINTMENT (OUTPATIENT)
Dept: RADIOLOGY | Facility: MEDICAL CENTER | Age: 51
DRG: 291 | End: 2021-05-14
Attending: EMERGENCY MEDICINE
Payer: COMMERCIAL

## 2021-05-14 ENCOUNTER — HOSPITAL ENCOUNTER (INPATIENT)
Facility: MEDICAL CENTER | Age: 51
LOS: 2 days | DRG: 291 | End: 2021-05-16
Attending: EMERGENCY MEDICINE | Admitting: INTERNAL MEDICINE
Payer: COMMERCIAL

## 2021-05-14 ENCOUNTER — APPOINTMENT (OUTPATIENT)
Dept: CARDIOLOGY | Facility: MEDICAL CENTER | Age: 51
DRG: 291 | End: 2021-05-14
Attending: INTERNAL MEDICINE
Payer: COMMERCIAL

## 2021-05-14 DIAGNOSIS — R06.00 DYSPNEA, UNSPECIFIED TYPE: ICD-10-CM

## 2021-05-14 DIAGNOSIS — I48.91 ATRIAL FIBRILLATION WITH RVR (HCC): ICD-10-CM

## 2021-05-14 DIAGNOSIS — R09.02 HYPOXIA: ICD-10-CM

## 2021-05-14 DIAGNOSIS — R91.8 GROUND GLASS OPACITY PRESENT ON IMAGING OF LUNG: ICD-10-CM

## 2021-05-14 DIAGNOSIS — I50.21 ACUTE SYSTOLIC HEART FAILURE (HCC): ICD-10-CM

## 2021-05-14 PROBLEM — J96.01 ACUTE RESPIRATORY FAILURE WITH HYPOXIA (HCC): Status: ACTIVE | Noted: 2021-05-14

## 2021-05-14 LAB
ALBUMIN SERPL BCP-MCNC: 3.7 G/DL (ref 3.2–4.9)
ALBUMIN/GLOB SERPL: 1.4 G/DL
ALP SERPL-CCNC: 109 U/L (ref 30–99)
ALT SERPL-CCNC: 69 U/L (ref 2–50)
ANION GAP SERPL CALC-SCNC: 14 MMOL/L (ref 7–16)
AST SERPL-CCNC: 74 U/L (ref 12–45)
BACTERIA SPEC RESP CULT: NORMAL
BASOPHILS # BLD AUTO: 1.4 % (ref 0–1.8)
BASOPHILS # BLD: 0.12 K/UL (ref 0–0.12)
BILIRUB SERPL-MCNC: 0.7 MG/DL (ref 0.1–1.5)
BUN SERPL-MCNC: 10 MG/DL (ref 8–22)
CALCIUM SERPL-MCNC: 8.5 MG/DL (ref 8.4–10.2)
CHLORIDE SERPL-SCNC: 103 MMOL/L (ref 96–112)
CO2 SERPL-SCNC: 23 MMOL/L (ref 20–33)
CREAT SERPL-MCNC: 0.88 MG/DL (ref 0.5–1.4)
EKG IMPRESSION: NORMAL
EOSINOPHIL # BLD AUTO: 0.12 K/UL (ref 0–0.51)
EOSINOPHIL NFR BLD: 1.4 % (ref 0–6.9)
ERYTHROCYTE [DISTWIDTH] IN BLOOD BY AUTOMATED COUNT: 53 FL (ref 35.9–50)
FLUAV RNA SPEC QL NAA+PROBE: NEGATIVE
FLUBV RNA SPEC QL NAA+PROBE: NEGATIVE
GLOBULIN SER CALC-MCNC: 2.6 G/DL (ref 1.9–3.5)
GLUCOSE SERPL-MCNC: 132 MG/DL (ref 65–99)
GRAM STN SPEC: NORMAL
GRAM STN SPEC: NORMAL
HCT VFR BLD AUTO: 47.9 % (ref 42–52)
HGB BLD-MCNC: 16.2 G/DL (ref 14–18)
IMM GRANULOCYTES # BLD AUTO: 0.03 K/UL (ref 0–0.11)
IMM GRANULOCYTES NFR BLD AUTO: 0.4 % (ref 0–0.9)
LACTATE BLD-SCNC: 3 MMOL/L (ref 0.5–2)
LV EJECT FRACT  99904: 35
LV EJECT FRACT MOD 2C 99903: 42.26
LV EJECT FRACT MOD 4C 99902: 44.36
LV EJECT FRACT MOD BP 99901: 42.1
LYMPHOCYTES # BLD AUTO: 0.89 K/UL (ref 1–4.8)
LYMPHOCYTES NFR BLD: 10.7 % (ref 22–41)
MCH RBC QN AUTO: 34.2 PG (ref 27–33)
MCHC RBC AUTO-ENTMCNC: 33.8 G/DL (ref 33.7–35.3)
MCV RBC AUTO: 101.3 FL (ref 81.4–97.8)
MONOCYTES # BLD AUTO: 1.11 K/UL (ref 0–0.85)
MONOCYTES NFR BLD AUTO: 13.4 % (ref 0–13.4)
NEUTROPHILS # BLD AUTO: 6.01 K/UL (ref 1.82–7.42)
NEUTROPHILS NFR BLD: 72.7 % (ref 44–72)
NRBC # BLD AUTO: 0 K/UL
NRBC BLD-RTO: 0 /100 WBC
NT-PROBNP SERPL IA-MCNC: 921 PG/ML (ref 0–125)
PLATELET # BLD AUTO: 168 K/UL (ref 164–446)
PMV BLD AUTO: 9.7 FL (ref 9–12.9)
POTASSIUM SERPL-SCNC: 3.7 MMOL/L (ref 3.6–5.5)
PROCALCITONIN SERPL-MCNC: 0.15 NG/ML
PROT SERPL-MCNC: 6.3 G/DL (ref 6–8.2)
RBC # BLD AUTO: 4.73 M/UL (ref 4.7–6.1)
RSV RNA SPEC QL NAA+PROBE: NEGATIVE
SARS-COV-2 RNA RESP QL NAA+PROBE: NOTDETECTED
SIGNIFICANT IND 70042: NORMAL
SIGNIFICANT IND 70042: NORMAL
SITE SITE: NORMAL
SITE SITE: NORMAL
SODIUM SERPL-SCNC: 140 MMOL/L (ref 135–145)
SOURCE SOURCE: NORMAL
SOURCE SOURCE: NORMAL
SPECIMEN SOURCE: NORMAL
TROPONIN T SERPL-MCNC: 23 NG/L (ref 6–19)
TROPONIN T SERPL-MCNC: 28 NG/L (ref 6–19)
TROPONIN T SERPL-MCNC: 30 NG/L (ref 6–19)
TSH SERPL DL<=0.005 MIU/L-ACNC: 1.97 UIU/ML (ref 0.38–5.33)
WBC # BLD AUTO: 8.3 K/UL (ref 4.8–10.8)

## 2021-05-14 PROCEDURE — 36415 COLL VENOUS BLD VENIPUNCTURE: CPT

## 2021-05-14 PROCEDURE — 93306 TTE W/DOPPLER COMPLETE: CPT

## 2021-05-14 PROCEDURE — 700105 HCHG RX REV CODE 258: Performed by: EMERGENCY MEDICINE

## 2021-05-14 PROCEDURE — 99291 CRITICAL CARE FIRST HOUR: CPT

## 2021-05-14 PROCEDURE — 87070 CULTURE OTHR SPECIMN AEROBIC: CPT

## 2021-05-14 PROCEDURE — 87040 BLOOD CULTURE FOR BACTERIA: CPT

## 2021-05-14 PROCEDURE — 700111 HCHG RX REV CODE 636 W/ 250 OVERRIDE (IP): Performed by: EMERGENCY MEDICINE

## 2021-05-14 PROCEDURE — 0241U HCHG SARS-COV-2 COVID-19 NFCT DS RESP RNA 4 TRGT MIC: CPT

## 2021-05-14 PROCEDURE — 700117 HCHG RX CONTRAST REV CODE 255: Performed by: INTERNAL MEDICINE

## 2021-05-14 PROCEDURE — C9803 HOPD COVID-19 SPEC COLLECT: HCPCS | Performed by: EMERGENCY MEDICINE

## 2021-05-14 PROCEDURE — 93005 ELECTROCARDIOGRAM TRACING: CPT | Performed by: EMERGENCY MEDICINE

## 2021-05-14 PROCEDURE — 80053 COMPREHEN METABOLIC PANEL: CPT

## 2021-05-14 PROCEDURE — 700111 HCHG RX REV CODE 636 W/ 250 OVERRIDE (IP): Performed by: INTERNAL MEDICINE

## 2021-05-14 PROCEDURE — 99291 CRITICAL CARE FIRST HOUR: CPT | Performed by: INTERNAL MEDICINE

## 2021-05-14 PROCEDURE — 96375 TX/PRO/DX INJ NEW DRUG ADDON: CPT

## 2021-05-14 PROCEDURE — A9270 NON-COVERED ITEM OR SERVICE: HCPCS | Performed by: INTERNAL MEDICINE

## 2021-05-14 PROCEDURE — 700102 HCHG RX REV CODE 250 W/ 637 OVERRIDE(OP): Performed by: INTERNAL MEDICINE

## 2021-05-14 PROCEDURE — 84443 ASSAY THYROID STIM HORMONE: CPT

## 2021-05-14 PROCEDURE — 84484 ASSAY OF TROPONIN QUANT: CPT

## 2021-05-14 PROCEDURE — 93005 ELECTROCARDIOGRAM TRACING: CPT

## 2021-05-14 PROCEDURE — 83605 ASSAY OF LACTIC ACID: CPT

## 2021-05-14 PROCEDURE — 96365 THER/PROPH/DIAG IV INF INIT: CPT

## 2021-05-14 PROCEDURE — 83880 ASSAY OF NATRIURETIC PEPTIDE: CPT

## 2021-05-14 PROCEDURE — 87205 SMEAR GRAM STAIN: CPT

## 2021-05-14 PROCEDURE — 93306 TTE W/DOPPLER COMPLETE: CPT | Mod: 26 | Performed by: INTERNAL MEDICINE

## 2021-05-14 PROCEDURE — 84145 PROCALCITONIN (PCT): CPT

## 2021-05-14 PROCEDURE — 85025 COMPLETE CBC W/AUTO DIFF WBC: CPT

## 2021-05-14 PROCEDURE — 700117 HCHG RX CONTRAST REV CODE 255: Performed by: EMERGENCY MEDICINE

## 2021-05-14 PROCEDURE — 71045 X-RAY EXAM CHEST 1 VIEW: CPT

## 2021-05-14 PROCEDURE — 71275 CT ANGIOGRAPHY CHEST: CPT

## 2021-05-14 PROCEDURE — 99223 1ST HOSP IP/OBS HIGH 75: CPT | Performed by: INTERNAL MEDICINE

## 2021-05-14 PROCEDURE — 770022 HCHG ROOM/CARE - ICU (200)

## 2021-05-14 RX ORDER — SODIUM CHLORIDE, SODIUM LACTATE, POTASSIUM CHLORIDE, AND CALCIUM CHLORIDE .6; .31; .03; .02 G/100ML; G/100ML; G/100ML; G/100ML
30 INJECTION, SOLUTION INTRAVENOUS ONCE
Status: DISCONTINUED | OUTPATIENT
Start: 2021-05-14 | End: 2021-05-14

## 2021-05-14 RX ORDER — LORAZEPAM 0.5 MG/1
0.5 TABLET ORAL EVERY 4 HOURS PRN
Status: DISCONTINUED | OUTPATIENT
Start: 2021-05-14 | End: 2021-05-16 | Stop reason: HOSPADM

## 2021-05-14 RX ORDER — LORAZEPAM 1 MG/1
3 TABLET ORAL
Status: DISCONTINUED | OUTPATIENT
Start: 2021-05-14 | End: 2021-05-16 | Stop reason: HOSPADM

## 2021-05-14 RX ORDER — CEPHALEXIN 500 MG/1
500 CAPSULE ORAL 3 TIMES DAILY
Status: ON HOLD | COMMUNITY
Start: 2021-05-06 | End: 2021-05-20

## 2021-05-14 RX ORDER — SPIRONOLACTONE 25 MG/1
25 TABLET ORAL
Status: DISCONTINUED | OUTPATIENT
Start: 2021-05-14 | End: 2021-05-16 | Stop reason: HOSPADM

## 2021-05-14 RX ORDER — FUROSEMIDE 10 MG/ML
40 INJECTION INTRAMUSCULAR; INTRAVENOUS
Status: DISCONTINUED | OUTPATIENT
Start: 2021-05-14 | End: 2021-05-15

## 2021-05-14 RX ORDER — LORAZEPAM 2 MG/ML
0.5 INJECTION INTRAMUSCULAR EVERY 4 HOURS PRN
Status: DISCONTINUED | OUTPATIENT
Start: 2021-05-14 | End: 2021-05-16 | Stop reason: HOSPADM

## 2021-05-14 RX ORDER — FLUTICASONE PROPIONATE 50 MCG
1 SPRAY, SUSPENSION (ML) NASAL 2 TIMES DAILY PRN
COMMUNITY
End: 2021-05-27

## 2021-05-14 RX ORDER — CARVEDILOL 3.12 MG/1
3.12 TABLET ORAL 2 TIMES DAILY WITH MEALS
Status: DISCONTINUED | OUTPATIENT
Start: 2021-05-15 | End: 2021-05-16

## 2021-05-14 RX ORDER — LORAZEPAM 2 MG/ML
1 INJECTION INTRAMUSCULAR
Status: DISCONTINUED | OUTPATIENT
Start: 2021-05-14 | End: 2021-05-16 | Stop reason: HOSPADM

## 2021-05-14 RX ORDER — DILTIAZEM HYDROCHLORIDE 5 MG/ML
25 INJECTION INTRAVENOUS ONCE
Status: COMPLETED | OUTPATIENT
Start: 2021-05-14 | End: 2021-05-14

## 2021-05-14 RX ORDER — DIGOXIN 0.25 MG/ML
250 INJECTION INTRAMUSCULAR; INTRAVENOUS ONCE
Status: COMPLETED | OUTPATIENT
Start: 2021-05-14 | End: 2021-05-14

## 2021-05-14 RX ORDER — LORAZEPAM 1 MG/1
1 TABLET ORAL EVERY 4 HOURS PRN
Status: DISCONTINUED | OUTPATIENT
Start: 2021-05-14 | End: 2021-05-16 | Stop reason: HOSPADM

## 2021-05-14 RX ORDER — LORAZEPAM 1 MG/1
4 TABLET ORAL
Status: DISCONTINUED | OUTPATIENT
Start: 2021-05-14 | End: 2021-05-16 | Stop reason: HOSPADM

## 2021-05-14 RX ORDER — LORAZEPAM 2 MG/ML
2 INJECTION INTRAMUSCULAR
Status: DISCONTINUED | OUTPATIENT
Start: 2021-05-14 | End: 2021-05-16 | Stop reason: HOSPADM

## 2021-05-14 RX ORDER — AZITHROMYCIN 250 MG/1
500 TABLET, FILM COATED ORAL DAILY
Status: DISCONTINUED | OUTPATIENT
Start: 2021-05-14 | End: 2021-05-15

## 2021-05-14 RX ORDER — LORAZEPAM 2 MG/ML
1.5 INJECTION INTRAMUSCULAR
Status: DISCONTINUED | OUTPATIENT
Start: 2021-05-14 | End: 2021-05-16 | Stop reason: HOSPADM

## 2021-05-14 RX ORDER — LORAZEPAM 1 MG/1
2 TABLET ORAL
Status: DISCONTINUED | OUTPATIENT
Start: 2021-05-14 | End: 2021-05-16 | Stop reason: HOSPADM

## 2021-05-14 RX ORDER — VALSARTAN 80 MG/1
40 TABLET ORAL TWICE DAILY
Status: DISCONTINUED | OUTPATIENT
Start: 2021-05-14 | End: 2021-05-16 | Stop reason: HOSPADM

## 2021-05-14 RX ORDER — DIGOXIN 0.25 MG/ML
250 INJECTION INTRAMUSCULAR; INTRAVENOUS EVERY 8 HOURS
Status: COMPLETED | OUTPATIENT
Start: 2021-05-14 | End: 2021-05-15

## 2021-05-14 RX ADMIN — CEFTRIAXONE SODIUM 2 G: 2 INJECTION, POWDER, FOR SOLUTION INTRAMUSCULAR; INTRAVENOUS at 15:32

## 2021-05-14 RX ADMIN — SPIRONOLACTONE 25 MG: 25 TABLET, FILM COATED ORAL at 17:33

## 2021-05-14 RX ADMIN — ENOXAPARIN SODIUM 120 MG: 120 INJECTION SUBCUTANEOUS at 17:16

## 2021-05-14 RX ADMIN — VALSARTAN 40 MG: 80 TABLET, FILM COATED ORAL at 17:12

## 2021-05-14 RX ADMIN — DILTIAZEM HYDROCHLORIDE 25 MG: 5 INJECTION INTRAVENOUS at 12:22

## 2021-05-14 RX ADMIN — DIGOXIN 250 MCG: 0.25 INJECTION INTRAMUSCULAR; INTRAVENOUS at 23:05

## 2021-05-14 RX ADMIN — HUMAN ALBUMIN MICROSPHERES AND PERFLUTREN 3 ML: 10; .22 INJECTION, SOLUTION INTRAVENOUS at 17:07

## 2021-05-14 RX ADMIN — DIGOXIN 250 MCG: 0.25 INJECTION INTRAMUSCULAR; INTRAVENOUS at 20:56

## 2021-05-14 RX ADMIN — DIGOXIN 250 MCG: 0.25 INJECTION INTRAMUSCULAR; INTRAVENOUS at 17:16

## 2021-05-14 RX ADMIN — FUROSEMIDE 40 MG: 10 INJECTION, SOLUTION INTRAMUSCULAR; INTRAVENOUS at 17:16

## 2021-05-14 RX ADMIN — AZITHROMYCIN MONOHYDRATE 500 MG: 250 TABLET ORAL at 17:16

## 2021-05-14 RX ADMIN — IOHEXOL 100 ML: 350 INJECTION, SOLUTION INTRAVENOUS at 14:10

## 2021-05-14 ASSESSMENT — COGNITIVE AND FUNCTIONAL STATUS - GENERAL
SUGGESTED CMS G CODE MODIFIER MOBILITY: CH
DAILY ACTIVITIY SCORE: 24
MOBILITY SCORE: 24
SUGGESTED CMS G CODE MODIFIER DAILY ACTIVITY: CH

## 2021-05-14 ASSESSMENT — LIFESTYLE VARIABLES
VISUAL DISTURBANCES: NOT PRESENT
HAVE PEOPLE ANNOYED YOU BY CRITICIZING YOUR DRINKING: NO
HAVE PEOPLE ANNOYED YOU BY CRITICIZING YOUR DRINKING: NO
EVER HAD A DRINK FIRST THING IN THE MORNING TO STEADY YOUR NERVES TO GET RID OF A HANGOVER: NO
CONSUMPTION TOTAL: POSITIVE
TOTAL SCORE: 2
TOTAL SCORE: 2
EVER FELT BAD OR GUILTY ABOUT YOUR DRINKING: YES
TOTAL SCORE: 2
AVERAGE NUMBER OF DAYS PER WEEK YOU HAVE A DRINK CONTAINING ALCOHOL: 5
EVER FELT BAD OR GUILTY ABOUT YOUR DRINKING: YES
NAUSEA AND VOMITING: MILD NAUSEA WITH NO VOMITING
TOTAL SCORE: 2
ALCOHOL_USE: YES
ON A TYPICAL DAY WHEN YOU DRINK ALCOHOL HOW MANY DRINKS DO YOU HAVE: 3
CONSUMPTION TOTAL: POSITIVE
AUDITORY DISTURBANCES: NOT PRESENT
PAROXYSMAL SWEATS: BARELY PERCEPTIBLE SWEATING. PALMS MOIST
HOW MANY TIMES IN THE PAST YEAR HAVE YOU HAD 5 OR MORE DRINKS IN A DAY: 2
AGITATION: SOMEWHAT MORE THAN NORMAL ACTIVITY
ANXIETY: *
HEADACHE, FULLNESS IN HEAD: NOT PRESENT
TREMOR: *
ON A TYPICAL DAY WHEN YOU DRINK ALCOHOL HOW MANY DRINKS DO YOU HAVE: 3
AVERAGE NUMBER OF DAYS PER WEEK YOU HAVE A DRINK CONTAINING ALCOHOL: 5
PAROXYSMAL SWEATS: *
TOTAL SCORE: 2
DO YOU DRINK ALCOHOL: YES
ORIENTATION AND CLOUDING OF SENSORIUM: ORIENTED AND CAN DO SERIAL ADDITIONS
TOTAL SCORE: 3
TOTAL SCORE: 9
NAUSEA AND VOMITING: NO NAUSEA AND NO VOMITING
TREMOR: TREMOR NOT VISIBLE BUT CAN BE FELT, FINGERTIP TO FINGERTIP
TOTAL SCORE: 2
VISUAL DISTURBANCES: NOT PRESENT
EVER HAD A DRINK FIRST THING IN THE MORNING TO STEADY YOUR NERVES TO GET RID OF A HANGOVER: NO
HEADACHE, FULLNESS IN HEAD: NOT PRESENT
HAVE YOU EVER FELT YOU SHOULD CUT DOWN ON YOUR DRINKING: YES
ORIENTATION AND CLOUDING OF SENSORIUM: ORIENTED AND CAN DO SERIAL ADDITIONS
AUDITORY DISTURBANCES: NOT PRESENT
HAVE YOU EVER FELT YOU SHOULD CUT DOWN ON YOUR DRINKING: YES
ANXIETY: MILDLY ANXIOUS
HOW MANY TIMES IN THE PAST YEAR HAVE YOU HAD 5 OR MORE DRINKS IN A DAY: 2
AGITATION: NORMAL ACTIVITY

## 2021-05-14 ASSESSMENT — ENCOUNTER SYMPTOMS
PND: 1
SHORTNESS OF BREATH: 1
NEUROLOGICAL NEGATIVE: 1
GASTROINTESTINAL NEGATIVE: 1
MUSCULOSKELETAL NEGATIVE: 1
CHILLS: 0
EYES NEGATIVE: 1
PSYCHIATRIC NEGATIVE: 1
DIAPHORESIS: 0
ORTHOPNEA: 1
FEVER: 0

## 2021-05-14 ASSESSMENT — FIBROSIS 4 INDEX
FIB4 SCORE: 2.55
FIB4 SCORE: 6.4

## 2021-05-14 ASSESSMENT — PATIENT HEALTH QUESTIONNAIRE - PHQ9
2. FEELING DOWN, DEPRESSED, IRRITABLE, OR HOPELESS: NOT AT ALL
2. FEELING DOWN, DEPRESSED, IRRITABLE, OR HOPELESS: NOT AT ALL
1. LITTLE INTEREST OR PLEASURE IN DOING THINGS: NOT AT ALL
1. LITTLE INTEREST OR PLEASURE IN DOING THINGS: NOT AT ALL
SUM OF ALL RESPONSES TO PHQ9 QUESTIONS 1 AND 2: 0
SUM OF ALL RESPONSES TO PHQ9 QUESTIONS 1 AND 2: 0

## 2021-05-14 NOTE — CONSULTS
Reason for Consult:  Asked by Dr Shmuel Hurtado M.D. to see this patient with  AF     CC:   Chief Complaint   Patient presents with   • Peripheral Edema   • Shortness of Breath       HPI:    50 year old man with PMH varicose veins presents with worsening leg edema.    Explains that has a long history of varicose veins and typically has leg swelling. However past two weeks progressing far above normal. Also noted progressive dyspnea, orthopnea, and weight gain. States typically run/walks a few miles but now cannot walk more than 500ft without dyspnea stopping him. Denies tobacco. Does drink 1-2 beers per day. On weekends will binge tequila at family parties; at least 8 drinks. Denies illicit drugs outside of marijuana which he rarely uses.     Medications / Drug list prior to admission:  No current facility-administered medications on file prior to encounter.     Current Outpatient Medications on File Prior to Encounter   Medication Sig Dispense Refill   • fluticasone (FLONASE) 50 MCG/ACT nasal spray Administer 1 Spray into affected nostril(S) 2 times a day as needed (allergy).     • cephALEXin (KEFLEX) 500 MG Cap Take 500 mg by mouth 3 times a day. 10 day course     • NON SPECIFIED Take 2 Tablets by mouth 2 times a day as needed (swelling). OTC Diuretic     • Ascorbic Acid (VITAMIN C GUMMIE PO) Take 3-4 Tablets by mouth every day.     • multivitamin-iron-minerals-folic acid (CENTRUM) chewable tablet Chew 2 Tablets every day.     • predniSONE (DELTASONE) 20 MG Tab Take 3 tabs at once PO daily x 5 days, then take 2 tabs at once daily x 3 days, then take 1 tab PO daily x 2 days (Patient not taking: Reported on 5/14/2021) 23 Tab 0       Current list of administered Medications:    Current Facility-Administered Medications:   •  furosemide (LASIX) injection 40 mg, 40 mg, Intravenous, BID DIURETIC, Elvis Cameron M.D.  •  spironolactone (ALDACTONE) tablet 25 mg, 25 mg, Oral, Q DAY, Elvis Cameron M.D.  •  valsartan  (DIOVAN) tablet 40 mg, 40 mg, Oral, TWICE DAILY, Elvis Cameron M.D.  •  [START ON 5/15/2021] carvedilol (COREG) tablet 3.125 mg, 3.125 mg, Oral, BID WITH MEALS, Elvis Cameron M.D.  •  digoxin (LANOXIN) injection 250 mcg, 250 mcg, Intravenous, Q8HRS, Elvis Cameron M.D.    Current Outpatient Medications:   •  fluticasone (FLONASE) 50 MCG/ACT nasal spray, Administer 1 Spray into affected nostril(S) 2 times a day as needed (allergy)., Disp: , Rfl:   •  cephALEXin (KEFLEX) 500 MG Cap, Take 500 mg by mouth 3 times a day. 10 day course, Disp: , Rfl:   •  NON SPECIFIED, Take 2 Tablets by mouth 2 times a day as needed (swelling). OTC Diuretic, Disp: , Rfl:   •  Ascorbic Acid (VITAMIN C GUMMIE PO), Take 3-4 Tablets by mouth every day., Disp: , Rfl:   •  multivitamin-iron-minerals-folic acid (CENTRUM) chewable tablet, Chew 2 Tablets every day., Disp: , Rfl:   •  predniSONE (DELTASONE) 20 MG Tab, Take 3 tabs at once PO daily x 5 days, then take 2 tabs at once daily x 3 days, then take 1 tab PO daily x 2 days (Patient not taking: Reported on 5/14/2021), Disp: 23 Tab, Rfl: 0    History reviewed. No pertinent past medical history.    History reviewed. No pertinent surgical history.    History reviewed. No pertinent family history.  Patient family history was personally reviewed, no pertinent family history to current presentation    Social History     Socioeconomic History   • Marital status:      Spouse name: Not on file   • Number of children: Not on file   • Years of education: Not on file   • Highest education level: Not on file   Occupational History   • Not on file   Tobacco Use   • Smoking status: Former Smoker     Types: Cigarettes   • Smokeless tobacco: Never Used   Substance and Sexual Activity   • Alcohol use: Yes     Comment: Occasionally   • Drug use: No   • Sexual activity: Not on file   Other Topics Concern   • Not on file   Social History Narrative   • Not on file     Social Determinants of Health      Financial Resource Strain:    • Difficulty of Paying Living Expenses:    Food Insecurity:    • Worried About Running Out of Food in the Last Year:    • Ran Out of Food in the Last Year:    Transportation Needs:    • Lack of Transportation (Medical):    • Lack of Transportation (Non-Medical):    Physical Activity:    • Days of Exercise per Week:    • Minutes of Exercise per Session:    Stress:    • Feeling of Stress :    Social Connections:    • Frequency of Communication with Friends and Family:    • Frequency of Social Gatherings with Friends and Family:    • Attends [employer] Services:    • Active Member of Clubs or Organizations:    • Attends Club or Organization Meetings:    • Marital Status:    Intimate Partner Violence:    • Fear of Current or Ex-Partner:    • Emotionally Abused:    • Physically Abused:    • Sexually Abused:        ALLERGIES:  No Known Allergies    Review of systems:  A complete review of symptoms was reviewed with patient. This is reviewed in H&P and PMH. ALL OTHERS reviewed and negative    Physical exam:  Patient Vitals for the past 24 hrs:   BP Temp Temp src Pulse Resp SpO2 Height Weight   05/14/21 1552 -- -- -- -- 20 -- -- --   05/14/21 1550 121/77 -- -- (!) 125 -- 93 % -- --   05/14/21 1520 121/84 -- -- (!) 118 (!) 24 93 % -- --   05/14/21 1421 -- -- -- (!) 115 (!) 22 95 % -- --   05/14/21 1420 118/87 -- -- (!) 111 -- 96 % -- --   05/14/21 1344 -- -- -- (!) 108 19 95 % -- --   05/14/21 1323 -- -- -- (!) 134 (!) 25 96 % -- --   05/14/21 1319 102/80 -- -- -- -- -- -- --   05/14/21 1303 -- -- -- (!) 102 20 93 % -- --   05/14/21 1302 -- -- -- (!) 133 (!) 28 95 % -- --   05/14/21 1251 -- -- -- (!) 120 17 96 % -- --   05/14/21 1249 (!) 92/63 -- -- -- (!) 22 95 % -- --   05/14/21 1227 -- -- -- (!) 134 (!) 27 -- -- --   05/14/21 1225 -- -- -- -- (!) 21 -- -- --   05/14/21 1224 -- -- -- (!) 154 -- 95 % -- --   05/14/21 1219 131/94 -- -- (!) 160 -- -- -- --   05/14/21 1150 113/86 -- -- -- -- 95  % -- --   21 1134 -- -- -- (!) 150 (!) 23 94 % -- --   21 1110 138/96 36.6 °C (97.9 °F) Temporal 84 18 92 % 1.829 m (6') (!) 130 kg (286 lb 9.6 oz)     General: No acute distress.   EYES: no jaundice  HEENT: OP clear   Neck: No bruits. JVD to ear lobe.   CVS: irreg. S1 + S2. No M/R/G. 2+ edema.  Resp: rales  Abdomen: Soft, NT, ND,  Skin: Grossly nothing acute no obvious rashes  Neurological: Alert, Moves all extremities, no cranial nerve defects on limited exam  Extremities: Pulse 2+ in b/l LE. No cyanosis.     Data:  Laboratory studies personally reviewed by me:  Recent Results (from the past 24 hour(s))   EKG    Collection Time: 21 11:13 AM   Result Value Ref Range    Report       [facility] Emergency Dept[facility]    Test Date:  2021  Pt Name:    JAY BARRY                Department: [facility]  MRN:        [id number]                      [facility]  Gender:     Male                         Technician:   :        1970                   Requested By:ER TRIAGE PROTOCOL  Order #:    [id number]                    Reading MD: ANAID OLIVEROS MD    Measurements  Intervals                                Axis  Rate:       151                          P:          226  KS:         116                          QRS:        -88  QRSD:       108                          T:          64  QT:         311  QTc:        494    Interpretive Statements  Sinus or ectopic atrial tachycardia  Multiple premature complexes, vent & supraven  Consider right ventricular hypertrophy  Inferior infarct, old  Probable anteroseptal infarct, old  Compared to ECG 2020 17:52:25  Sinus rhythm no longer present  Left anterior fascicular block no longer pres ent  Incomplete right bundle-branch block no longer present  Right bundle-branch block no longer present  Myocardial infarct finding still present  Electronically Signed On 2021 11:52:00 PDT by ANAID OLIVEROS MD     CBC with  Differential    Collection Time: 05/14/21 11:25 AM   Result Value Ref Range    WBC 8.3 4.8 - 10.8 K/uL    RBC 4.73 4.70 - 6.10 M/uL    Hemoglobin 16.2 14.0 - 18.0 g/dL    Hematocrit 47.9 42.0 - 52.0 %    .3 (H) 81.4 - 97.8 fL    MCH 34.2 (H) 27.0 - 33.0 pg    MCHC 33.8 33.7 - 35.3 g/dL    RDW 53.0 (H) 35.9 - 50.0 fL    Platelet Count 168 164 - 446 K/uL    MPV 9.7 9.0 - 12.9 fL    Neutrophils-Polys 72.70 (H) 44.00 - 72.00 %    Lymphocytes 10.70 (L) 22.00 - 41.00 %    Monocytes 13.40 0.00 - 13.40 %    Eosinophils 1.40 0.00 - 6.90 %    Basophils 1.40 0.00 - 1.80 %    Immature Granulocytes 0.40 0.00 - 0.90 %    Nucleated RBC 0.00 /100 WBC    Neutrophils (Absolute) 6.01 1.82 - 7.42 K/uL    Lymphs (Absolute) 0.89 (L) 1.00 - 4.80 K/uL    Monos (Absolute) 1.11 (H) 0.00 - 0.85 K/uL    Eos (Absolute) 0.12 0.00 - 0.51 K/uL    Baso (Absolute) 0.12 0.00 - 0.12 K/uL    Immature Granulocytes (abs) 0.03 0.00 - 0.11 K/uL    NRBC (Absolute) 0.00 K/uL   Complete Metabolic Panel (CMP)    Collection Time: 05/14/21 11:25 AM   Result Value Ref Range    Sodium 140 135 - 145 mmol/L    Potassium 3.7 3.6 - 5.5 mmol/L    Chloride 103 96 - 112 mmol/L    Co2 23 20 - 33 mmol/L    Anion Gap 14.0 7.0 - 16.0    Glucose 132 (H) 65 - 99 mg/dL    Bun 10 8 - 22 mg/dL    Creatinine 0.88 0.50 - 1.40 mg/dL    Calcium 8.5 8.4 - 10.2 mg/dL    AST(SGOT) 74 (H) 12 - 45 U/L    ALT(SGPT) 69 (H) 2 - 50 U/L    Alkaline Phosphatase 109 (H) 30 - 99 U/L    Total Bilirubin 0.7 0.1 - 1.5 mg/dL    Albumin 3.7 3.2 - 4.9 g/dL    Total Protein 6.3 6.0 - 8.2 g/dL    Globulin 2.6 1.9 - 3.5 g/dL    A-G Ratio 1.4 g/dL   Troponin    Collection Time: 05/14/21 11:25 AM   Result Value Ref Range    Troponin T 30 (H) 6 - 19 ng/L   ESTIMATED GFR    Collection Time: 05/14/21 11:25 AM   Result Value Ref Range    GFR If African American >60 >60 mL/min/1.73 m 2    GFR If Non African American >60 >60 mL/min/1.73 m 2   TSH WITH REFLEX TO FT4    Collection Time: 05/14/21 11:25 AM    Result Value Ref Range    TSH 1.970 0.380 - 5.330 uIU/mL   proBrain Natriuretic Peptide, NT    Collection Time: 05/14/21 11:25 AM   Result Value Ref Range    NT-proBNP 921 (H) 0 - 125 pg/mL   COV-2, FLU A/B, AND RSV BY PCR (2-4 HOURS CEPHEID): Collect NP swab in VTM    Collection Time: 05/14/21  2:55 PM    Specimen: Respirate   Result Value Ref Range    Influenza virus A RNA Negative Negative    Influenza virus B, PCR Negative Negative    RSV, PCR Negative Negative    SARS-CoV-2 by PCR NotDetected     SARS-CoV-2 Source NP Swab    LACTIC ACID    Collection Time: 05/14/21  3:11 PM   Result Value Ref Range    Lactic Acid 3.0 (H) 0.5 - 2.0 mmol/L   PROCALCITONIN    Collection Time: 05/14/21  3:11 PM   Result Value Ref Range    Procalcitonin 0.15 <0.25 ng/mL     All pertinent features of laboratory and imaging reviewed including primary images where applicable    EKG - 5/14/21  AF , inferior q waves    Active Problems:    * No active hospital problems. *  Resolved Problems:    * No resolved hospital problems. *      Assessment / Plan:  50 year old man with PMH varicose veins presents with worsening leg edema found acute systolic heart failure.    -follow up official TTE read  -lasix IV 40 bid; goal 2L negative daily at least  -add spironolactone  -add valsartan. Plan to convert to entresto at discharge  -tomorrow start carvedilol  -consider dapagliflozin 10mg daily at discharge  -digoxin for rate control AF  -lovenox 1mg/kg bid cva prevention  -etiology possibly alcohol related vs tachyarrhythmia.   -Once better optimized will plan ischemic testing and ALISE/DCCV.     I personally discussed his case with Dr Hurtado    It is my pleasure to participate in the care of Mr. Blake.  Please do not hesitate to contact me with questions or concerns.    Elvis Cameron MD  Cardiologist [facility]

## 2021-05-14 NOTE — ED NOTES
Pharmacy Medication Reconciliation    Med rec updated and complete per pt at bedside  Allergies have been verified       Patient reports that he is on a 10 day course of Keflex that was started on 05/06/2021  No current facility-administered medications on file prior to encounter.     Medication Sig   • fluticasone (FLONASE) 50 MCG/ACT nasal spray Administer 1 Spray into affected nostril(S) 2 times a day as needed (allergy).   • cephALEXin (KEFLEX) 500 MG Cap Take 500 mg by mouth 3 times a day. 10 day course   • NON SPECIFIED Take 2 Tablets by mouth 2 times a day as needed (swelling). OTC Diuretic   • Ascorbic Acid (VITAMIN C GUMMIE PO) Take 3-4 Tablets by mouth every day.   • multivitamin-iron-minerals-folic acid (CENTRUM) chewable tablet Chew 2 Tablets every day.

## 2021-05-14 NOTE — ED TRIAGE NOTES
Presents complaining of bilateral peripheral edema, and exertional dyspnea recurring for the past 2 months, ,and escalating for the past few days. He denies chest pain.  Chief Complaint   Patient presents with   • Peripheral Edema   • Shortness of Breath     /96   Pulse 84   Temp 36.6 °C (97.9 °F) (Temporal)   Resp 18   Ht 1.829 m (6')   Wt (!) 130 kg (286 lb 9.6 oz)   SpO2 92%   BMI 38.87 kg/m²

## 2021-05-14 NOTE — ED PROVIDER NOTES
ED Provider Note    ED Provider Note    Primary care provider: Ean Macario M.D.  Means of arrival: Walk-in  History obtained from: Patient    CHIEF COMPLAINT  Chief Complaint   Patient presents with   • Peripheral Edema   • Shortness of Breath     Seen at 11:51 AM.   HPI  Ross Blake is a 50 y.o. male who presents to the Emergency Department impressive bilateral lower extremity edema over the past few months, worse over the past few weeks.  The patient reports a distant history of recurrent varicose veins.  He has had stripping of veins back when he was a teenager.  Aside from this he does not note of any chronic medical condition.  He did see cardiology 3 weeks ago, he was told that he had a left bundle branch block but otherwise was unremarkable.  He also saw endocrinology earlier in this year as he has a family history of thyroid disease.    The patient notes a cough productive of white sputum.  He notes shortness of breath, orthopnea, lower extremity swelling.  He notes significant weight gain.  He denies any fevers, chills, night sweats, weight loss, abdominal pain, hemoptysis, bleeding diathesis.  No prior history of DVT or pulmonary embolus.  He drinks occasional alcohol, does not smoke.  REVIEW OF SYSTEMS  See HPI,   Remainder of ROS negative.     PAST MEDICAL HISTORY   Denies.    SURGICAL HISTORY  patient denies any surgical history    SOCIAL HISTORY  Social History     Tobacco Use   • Smoking status: Former Smoker     Types: Cigarettes   • Smokeless tobacco: Never Used   Substance Use Topics   • Alcohol use: Yes     Comment: Occasionally   • Drug use: No      Social History     Substance and Sexual Activity   Drug Use No       FAMILY HISTORY  History reviewed. No pertinent family history.    CURRENT MEDICATIONS  Reviewed.  See Encounter Summary.     ALLERGIES  No Known Allergies    PHYSICAL EXAM  VITAL SIGNS: /87   Pulse (!) 115   Temp 36.6 °C (97.9 °F) (Temporal)   Resp (!) 22   Ht  1.829 m (6')   Wt (!) 130 kg (286 lb 9.6 oz)   SpO2 95%   BMI 38.87 kg/m²   Constitutional: Awake, alert in no apparent distress.  HENT: Normocephalic, Bilateral external ears normal. Nose normal.   Eyes: Conjunctiva normal, non-icteric, EOMI.    Thorax & Lungs: Easy unlabored respirations, Clear to ascultation bilaterally.  Cardiovascular: Tachycardic, irregular, No murmurs, rubs or gallops. Bilateral pulses symmetrical.   Abdomen:  Soft, nontender, nondistended, normal active bowel sounds.   :    Skin: Visualized skin is  Dry, No erythema, No rash.   Musculoskeletal:   No cyanosis, clubbing or edema. No leg asymmetry.   Neurologic: Alert, Grossly non-focal.   Psychiatric: Normal affect, Normal mood  Lymphatic:  No cervical LAD    EKG   12 lead Interpreted by me  Rhythm: Atrial fibrillation  rate: 151  Axis: normal  Ectopy: none  Conduction: normal  ST Segments: no acute change  T Waves: no acute change  Clinical Impression: A. fib with RVR, otherwise unremarkable EKG.    RADIOLOGY  CT-CTA CHEST PULMONARY ARTERY W/ RECONS   Final Result      Right upper greater than lower lobe consolidation and groundglass is more likely from pneumonia than edema. Bacterial pneumonia is favored over Covid given the lack of left lung involvement. This is further supported by isolated right hilar    lymphadenopathy which most likely is reactive. Consider follow-up to resolution      Moderate multichamber cardiac enlargement and there is some interlobular septal thickening which is compatible with interstitial edema      Small right pleural effusion      Remote granulomatous disease      No CT evidence of pulmonary thromboembolism to the proximal segmental levels      Pectus excavatum deformity      DX-CHEST-PORTABLE (1 VIEW)   Final Result      1.  Right upper lobe airspace process suspicious for pneumonia      2.  Enlarged cardiac silhouette            COURSE & MEDICAL DECISION MAKING  Pertinent Labs & Imaging studies reviewed.  (See chart for details)    Differential diagnoses include but are not limited to: Malignancy, CHF, A. fib with RVR, sepsis    11:51 AM - Medical record reviewed, patient seen and examined at bedside.    12:07 PM: Patient appears to be in A. fib with RVR, plan to load with Cardizem to obtain rate control.  Right upper lobe lesion noted on chest x-ray, CTA to further evaluate, concerning for mass versus infarct/pulmonary embolus.  No leukocytosis, sepsis unlikely at this time.    12:30 PM after 20 mg of IV Cardizem, the patient had modest improvement in his heart rate, waxing between 105-120, unfortunately had hypotension, with systolics in the mid 90s.  Further Cardizem contraindicated at this time.    1:30 PM cardiology paged    2:40 PM intensivist paged, patient reassessed, tachycardia has resumed, resting heart rate 135 at this time, blood pressure has improved as well, systolic 128.    2:59 PM: Case discussed with Dr. Galindo, intensivist.  After reviewing the CT, he believes that this is likely a bacterial process.  He recommends blood cultures, sputum cultures and antibiotics.  30 cc/kg bolus fluids has been ordered in accordance with the sepsis protocol, this is a CMS requirements, not subject to p.o. challenge.  Reevaluation will be done upstairs on the unit.    Decision Making:  This is a pleasant 50 y.o. year old male who presents with bilateral lower extremity edema, cough of the past 3 weeks.  Clinically his presentation to me is most concerning for heart failure.  He presented with A. fib with RVR.  I attempted rate control him but resulted in subsequent hypotension.  Further rate control is contraindicated at this time.    Initial chest x-ray was suspicious for a mass, CTA was negative for mass but did show groundglass, infiltrates, suggestive of a bacterial process.  The patient does not have a leukocytosis or leftward shift.  He has had one vaccine for Covid, the second vaccine was scheduled for  today.  This is certainly in the differential.  COVID-19 swab is pending at this time.  Troponin slightly elevated at 30 without acute EKG changes, I suspect this is demand from A. fib with RVR.  Lactate, procalcitonin pending at this time.  Plan will be to admit the patient for possible sepsis, new onset atrial fibrillation with RVR, CHF.      CRITICAL CARE  The very real possibilty of a deterioration of this patient's condition required the highest level of my preparedness for sudden, emergent intervention.  I provided critical care services, which included medication orders, frequent reevaluations of the patient's condition and response to treatment, ordering and reviewing test results, and discussing the case with various consultants.  The critical care time associated with the care of the patient was 45 minutes. Review chart for interventions. This time is exclusive of any other billable procedures.       FINAL IMPRESSION  1. Atrial fibrillation with RVR (HCC)    2. Dyspnea, unspecified type    3. Hypoxia    4. Ground glass opacity present on imaging of lung

## 2021-05-15 ENCOUNTER — APPOINTMENT (OUTPATIENT)
Dept: RADIOLOGY | Facility: MEDICAL CENTER | Age: 51
DRG: 291 | End: 2021-05-15
Attending: INTERNAL MEDICINE
Payer: COMMERCIAL

## 2021-05-15 PROBLEM — F10.939 ALCOHOL WITHDRAWAL (HCC): Status: ACTIVE | Noted: 2021-05-15

## 2021-05-15 PROBLEM — I49.01 VENTRICULAR FIBRILLATION (HCC): Status: ACTIVE | Noted: 2021-05-15

## 2021-05-15 PROBLEM — R94.31 PROLONGED Q-T INTERVAL ON ECG: Status: ACTIVE | Noted: 2021-05-15

## 2021-05-15 LAB
ALBUMIN SERPL BCP-MCNC: 3.5 G/DL (ref 3.2–4.9)
ALBUMIN SERPL BCP-MCNC: 3.6 G/DL (ref 3.2–4.9)
ALBUMIN/GLOB SERPL: 1.3 G/DL
ALBUMIN/GLOB SERPL: 1.4 G/DL
ALP SERPL-CCNC: 103 U/L (ref 30–99)
ALP SERPL-CCNC: 105 U/L (ref 30–99)
ALT SERPL-CCNC: 57 U/L (ref 2–50)
ALT SERPL-CCNC: 63 U/L (ref 2–50)
ANION GAP SERPL CALC-SCNC: 13 MMOL/L (ref 7–16)
ANION GAP SERPL CALC-SCNC: 16 MMOL/L (ref 7–16)
AST SERPL-CCNC: 57 U/L (ref 12–45)
AST SERPL-CCNC: 61 U/L (ref 12–45)
BASOPHILS # BLD AUTO: 0.9 % (ref 0–1.8)
BASOPHILS # BLD AUTO: 1.1 % (ref 0–1.8)
BASOPHILS # BLD: 0.09 K/UL (ref 0–0.12)
BASOPHILS # BLD: 0.1 K/UL (ref 0–0.12)
BILIRUB SERPL-MCNC: 1.1 MG/DL (ref 0.1–1.5)
BILIRUB SERPL-MCNC: 1.2 MG/DL (ref 0.1–1.5)
BUN SERPL-MCNC: 7 MG/DL (ref 8–22)
BUN SERPL-MCNC: 9 MG/DL (ref 8–22)
CALCIUM SERPL-MCNC: 8.4 MG/DL (ref 8.4–10.2)
CALCIUM SERPL-MCNC: 8.7 MG/DL (ref 8.4–10.2)
CHLORIDE SERPL-SCNC: 96 MMOL/L (ref 96–112)
CHLORIDE SERPL-SCNC: 96 MMOL/L (ref 96–112)
CO2 SERPL-SCNC: 28 MMOL/L (ref 20–33)
CO2 SERPL-SCNC: 29 MMOL/L (ref 20–33)
CREAT SERPL-MCNC: 0.76 MG/DL (ref 0.5–1.4)
CREAT SERPL-MCNC: 0.87 MG/DL (ref 0.5–1.4)
DIGOXIN SERPL-MCNC: 1.1 NG/ML (ref 0.8–2)
EKG IMPRESSION: NORMAL
EOSINOPHIL # BLD AUTO: 0.04 K/UL (ref 0–0.51)
EOSINOPHIL # BLD AUTO: 0.37 K/UL (ref 0–0.51)
EOSINOPHIL NFR BLD: 0.4 % (ref 0–6.9)
EOSINOPHIL NFR BLD: 3.9 % (ref 0–6.9)
ERYTHROCYTE [DISTWIDTH] IN BLOOD BY AUTOMATED COUNT: 51.6 FL (ref 35.9–50)
ERYTHROCYTE [DISTWIDTH] IN BLOOD BY AUTOMATED COUNT: 52.3 FL (ref 35.9–50)
GLOBULIN SER CALC-MCNC: 2.6 G/DL (ref 1.9–3.5)
GLOBULIN SER CALC-MCNC: 2.7 G/DL (ref 1.9–3.5)
GLUCOSE SERPL-MCNC: 117 MG/DL (ref 65–99)
GLUCOSE SERPL-MCNC: 131 MG/DL (ref 65–99)
HCT VFR BLD AUTO: 47.6 % (ref 42–52)
HCT VFR BLD AUTO: 55 % (ref 42–52)
HGB BLD-MCNC: 15.9 G/DL (ref 14–18)
HGB BLD-MCNC: 18.4 G/DL (ref 14–18)
IMM GRANULOCYTES # BLD AUTO: 0.05 K/UL (ref 0–0.11)
IMM GRANULOCYTES # BLD AUTO: 0.06 K/UL (ref 0–0.11)
IMM GRANULOCYTES NFR BLD AUTO: 0.5 % (ref 0–0.9)
IMM GRANULOCYTES NFR BLD AUTO: 0.6 % (ref 0–0.9)
LACTATE BLD-SCNC: 4.2 MMOL/L (ref 0.5–2)
LYMPHOCYTES # BLD AUTO: 0.68 K/UL (ref 1–4.8)
LYMPHOCYTES # BLD AUTO: 1.63 K/UL (ref 1–4.8)
LYMPHOCYTES NFR BLD: 17.4 % (ref 22–41)
LYMPHOCYTES NFR BLD: 6.8 % (ref 22–41)
MAGNESIUM SERPL-MCNC: 1.7 MG/DL (ref 1.5–2.5)
MCH RBC QN AUTO: 34 PG (ref 27–33)
MCH RBC QN AUTO: 34.3 PG (ref 27–33)
MCHC RBC AUTO-ENTMCNC: 33.4 G/DL (ref 33.7–35.3)
MCHC RBC AUTO-ENTMCNC: 33.5 G/DL (ref 33.7–35.3)
MCV RBC AUTO: 101.7 FL (ref 81.4–97.8)
MCV RBC AUTO: 102.6 FL (ref 81.4–97.8)
MONOCYTES # BLD AUTO: 0.99 K/UL (ref 0–0.85)
MONOCYTES # BLD AUTO: 1.09 K/UL (ref 0–0.85)
MONOCYTES NFR BLD AUTO: 11.6 % (ref 0–13.4)
MONOCYTES NFR BLD AUTO: 9.9 % (ref 0–13.4)
NEUTROPHILS # BLD AUTO: 6.13 K/UL (ref 1.82–7.42)
NEUTROPHILS # BLD AUTO: 8.11 K/UL (ref 1.82–7.42)
NEUTROPHILS NFR BLD: 65.5 % (ref 44–72)
NEUTROPHILS NFR BLD: 81.4 % (ref 44–72)
NRBC # BLD AUTO: 0 K/UL
NRBC # BLD AUTO: 0 K/UL
NRBC BLD-RTO: 0 /100 WBC
NRBC BLD-RTO: 0 /100 WBC
PHOSPHATE SERPL-MCNC: 4 MG/DL (ref 2.5–4.5)
PLATELET # BLD AUTO: 144 K/UL (ref 164–446)
PLATELET # BLD AUTO: 151 K/UL (ref 164–446)
PMV BLD AUTO: 10.1 FL (ref 9–12.9)
PMV BLD AUTO: 9.9 FL (ref 9–12.9)
POTASSIUM SERPL-SCNC: 3.7 MMOL/L (ref 3.6–5.5)
POTASSIUM SERPL-SCNC: 3.9 MMOL/L (ref 3.6–5.5)
PROT SERPL-MCNC: 6.2 G/DL (ref 6–8.2)
PROT SERPL-MCNC: 6.2 G/DL (ref 6–8.2)
RBC # BLD AUTO: 4.68 M/UL (ref 4.7–6.1)
RBC # BLD AUTO: 5.36 M/UL (ref 4.7–6.1)
SODIUM SERPL-SCNC: 138 MMOL/L (ref 135–145)
SODIUM SERPL-SCNC: 140 MMOL/L (ref 135–145)
TROPONIN T SERPL-MCNC: 26 NG/L (ref 6–19)
WBC # BLD AUTO: 10 K/UL (ref 4.8–10.8)
WBC # BLD AUTO: 9.4 K/UL (ref 4.8–10.8)

## 2021-05-15 PROCEDURE — 80053 COMPREHEN METABOLIC PANEL: CPT

## 2021-05-15 PROCEDURE — 5A09357 ASSISTANCE WITH RESPIRATORY VENTILATION, LESS THAN 24 CONSECUTIVE HOURS, CONTINUOUS POSITIVE AIRWAY PRESSURE: ICD-10-PCS | Performed by: INTERNAL MEDICINE

## 2021-05-15 PROCEDURE — HZ2ZZZZ DETOXIFICATION SERVICES FOR SUBSTANCE ABUSE TREATMENT: ICD-10-PCS | Performed by: INTERNAL MEDICINE

## 2021-05-15 PROCEDURE — 700102 HCHG RX REV CODE 250 W/ 637 OVERRIDE(OP): Performed by: INTERNAL MEDICINE

## 2021-05-15 PROCEDURE — 83735 ASSAY OF MAGNESIUM: CPT

## 2021-05-15 PROCEDURE — 92950 HEART/LUNG RESUSCITATION CPR: CPT

## 2021-05-15 PROCEDURE — 700111 HCHG RX REV CODE 636 W/ 250 OVERRIDE (IP): Performed by: INTERNAL MEDICINE

## 2021-05-15 PROCEDURE — 85025 COMPLETE CBC W/AUTO DIFF WBC: CPT

## 2021-05-15 PROCEDURE — 770022 HCHG ROOM/CARE - ICU (200)

## 2021-05-15 PROCEDURE — 700105 HCHG RX REV CODE 258: Performed by: INTERNAL MEDICINE

## 2021-05-15 PROCEDURE — 83605 ASSAY OF LACTIC ACID: CPT

## 2021-05-15 PROCEDURE — 700111 HCHG RX REV CODE 636 W/ 250 OVERRIDE (IP): Performed by: HOSPITALIST

## 2021-05-15 PROCEDURE — 99291 CRITICAL CARE FIRST HOUR: CPT | Performed by: INTERNAL MEDICINE

## 2021-05-15 PROCEDURE — 94760 N-INVAS EAR/PLS OXIMETRY 1: CPT

## 2021-05-15 PROCEDURE — A9270 NON-COVERED ITEM OR SERVICE: HCPCS | Performed by: INTERNAL MEDICINE

## 2021-05-15 PROCEDURE — 5A2204Z RESTORATION OF CARDIAC RHYTHM, SINGLE: ICD-10-PCS | Performed by: HOSPITALIST

## 2021-05-15 PROCEDURE — 71045 X-RAY EXAM CHEST 1 VIEW: CPT

## 2021-05-15 PROCEDURE — 84484 ASSAY OF TROPONIN QUANT: CPT

## 2021-05-15 PROCEDURE — 93010 ELECTROCARDIOGRAM REPORT: CPT | Performed by: INTERNAL MEDICINE

## 2021-05-15 PROCEDURE — 84100 ASSAY OF PHOSPHORUS: CPT

## 2021-05-15 PROCEDURE — 80162 ASSAY OF DIGOXIN TOTAL: CPT

## 2021-05-15 PROCEDURE — 93005 ELECTROCARDIOGRAM TRACING: CPT | Performed by: INTERNAL MEDICINE

## 2021-05-15 PROCEDURE — 700111 HCHG RX REV CODE 636 W/ 250 OVERRIDE (IP)

## 2021-05-15 PROCEDURE — 5A12012 PERFORMANCE OF CARDIAC OUTPUT, SINGLE, MANUAL: ICD-10-PCS | Performed by: HOSPITALIST

## 2021-05-15 PROCEDURE — 99233 SBSQ HOSP IP/OBS HIGH 50: CPT | Performed by: INTERNAL MEDICINE

## 2021-05-15 PROCEDURE — 99291 CRITICAL CARE FIRST HOUR: CPT | Performed by: HOSPITALIST

## 2021-05-15 PROCEDURE — 94660 CPAP INITIATION&MGMT: CPT

## 2021-05-15 RX ORDER — MAGNESIUM SULFATE HEPTAHYDRATE 40 MG/ML
2 INJECTION, SOLUTION INTRAVENOUS ONCE
Status: COMPLETED | OUTPATIENT
Start: 2021-05-15 | End: 2021-05-16

## 2021-05-15 RX ORDER — FUROSEMIDE 10 MG/ML
40 INJECTION INTRAMUSCULAR; INTRAVENOUS ONCE
Status: COMPLETED | OUTPATIENT
Start: 2021-05-15 | End: 2021-05-15

## 2021-05-15 RX ORDER — POTASSIUM CHLORIDE 7.45 MG/ML
10 INJECTION INTRAVENOUS
Status: COMPLETED | OUTPATIENT
Start: 2021-05-15 | End: 2021-05-15

## 2021-05-15 RX ORDER — DILTIAZEM HYDROCHLORIDE 5 MG/ML
25 INJECTION INTRAVENOUS ONCE
Status: COMPLETED | OUTPATIENT
Start: 2021-05-15 | End: 2021-05-15

## 2021-05-15 RX ORDER — EPINEPHRINE IN SOD CHLOR,ISO 1 MG/10 ML
SYRINGE (ML) INTRAVENOUS
Status: COMPLETED | OUTPATIENT
Start: 2021-05-15 | End: 2021-05-15

## 2021-05-15 RX ORDER — METOPROLOL TARTRATE 1 MG/ML
5 INJECTION, SOLUTION INTRAVENOUS
Status: DISCONTINUED | OUTPATIENT
Start: 2021-05-15 | End: 2021-05-16 | Stop reason: HOSPADM

## 2021-05-15 RX ORDER — DILTIAZEM HYDROCHLORIDE 5 MG/ML
0.25 INJECTION INTRAVENOUS ONCE
Status: COMPLETED | OUTPATIENT
Start: 2021-05-15 | End: 2021-05-15

## 2021-05-15 RX ORDER — CHLORDIAZEPOXIDE HYDROCHLORIDE 25 MG/1
50 CAPSULE, GELATIN COATED ORAL EVERY 8 HOURS
Status: DISCONTINUED | OUTPATIENT
Start: 2021-05-15 | End: 2021-05-16 | Stop reason: HOSPADM

## 2021-05-15 RX ORDER — FUROSEMIDE 10 MG/ML
INJECTION INTRAMUSCULAR; INTRAVENOUS
Status: COMPLETED
Start: 2021-05-15 | End: 2021-05-15

## 2021-05-15 RX ORDER — DILTIAZEM HYDROCHLORIDE 5 MG/ML
INJECTION INTRAVENOUS
Status: COMPLETED
Start: 2021-05-15 | End: 2021-05-15

## 2021-05-15 RX ORDER — DIGOXIN 0.25 MG/ML
250 INJECTION INTRAMUSCULAR; INTRAVENOUS DAILY
Status: DISCONTINUED | OUTPATIENT
Start: 2021-05-15 | End: 2021-05-16 | Stop reason: HOSPADM

## 2021-05-15 RX ORDER — POTASSIUM CHLORIDE 20 MEQ/1
40 TABLET, EXTENDED RELEASE ORAL 2 TIMES DAILY
Status: COMPLETED | OUTPATIENT
Start: 2021-05-15 | End: 2021-05-16

## 2021-05-15 RX ORDER — FUROSEMIDE 10 MG/ML
40 INJECTION INTRAMUSCULAR; INTRAVENOUS
Status: DISCONTINUED | OUTPATIENT
Start: 2021-05-15 | End: 2021-05-15

## 2021-05-15 RX ORDER — CHOLECALCIFEROL (VITAMIN D3) 125 MCG
5 CAPSULE ORAL NIGHTLY
Status: DISCONTINUED | OUTPATIENT
Start: 2021-05-15 | End: 2021-05-16 | Stop reason: HOSPADM

## 2021-05-15 RX ORDER — DOXYCYCLINE 100 MG/1
100 TABLET ORAL EVERY 12 HOURS
Status: DISCONTINUED | OUTPATIENT
Start: 2021-05-16 | End: 2021-05-16 | Stop reason: HOSPADM

## 2021-05-15 RX ADMIN — LORAZEPAM 1.5 MG: 2 INJECTION INTRAMUSCULAR; INTRAVENOUS at 10:23

## 2021-05-15 RX ADMIN — FUROSEMIDE 40 MG: 10 INJECTION INTRAMUSCULAR; INTRAVENOUS at 02:30

## 2021-05-15 RX ADMIN — LORAZEPAM 1.5 MG: 2 INJECTION INTRAMUSCULAR; INTRAVENOUS at 07:24

## 2021-05-15 RX ADMIN — MAGNESIUM SULFATE 2 G: 2 INJECTION INTRAVENOUS at 22:48

## 2021-05-15 RX ADMIN — CHLORDIAZEPOXIDE HYDROCHLORIDE 50 MG: 25 CAPSULE ORAL at 09:53

## 2021-05-15 RX ADMIN — AMPICILLIN SODIUM AND SULBACTAM SODIUM 3 G: 2; 1 INJECTION, POWDER, FOR SOLUTION INTRAMUSCULAR; INTRAVENOUS at 17:46

## 2021-05-15 RX ADMIN — LORAZEPAM 1.5 MG: 2 INJECTION INTRAMUSCULAR; INTRAVENOUS at 02:29

## 2021-05-15 RX ADMIN — POTASSIUM CHLORIDE 10 MEQ: 7.46 INJECTION, SOLUTION INTRAVENOUS at 13:00

## 2021-05-15 RX ADMIN — FUROSEMIDE 40 MG: 10 INJECTION, SOLUTION INTRAMUSCULAR; INTRAVENOUS at 08:22

## 2021-05-15 RX ADMIN — CARVEDILOL 3.12 MG: 3.12 TABLET, FILM COATED ORAL at 07:21

## 2021-05-15 RX ADMIN — DILTIAZEM HYDROCHLORIDE 32.75 MG: 5 INJECTION INTRAVENOUS at 09:57

## 2021-05-15 RX ADMIN — LORAZEPAM 1.5 MG: 2 INJECTION INTRAMUSCULAR; INTRAVENOUS at 11:31

## 2021-05-15 RX ADMIN — DIGOXIN 250 MCG: 0.25 INJECTION INTRAMUSCULAR; INTRAVENOUS at 06:09

## 2021-05-15 RX ADMIN — EPINEPHRINE 1 MG: 0.1 INJECTION, SOLUTION ENDOTRACHEAL; INTRACARDIAC; INTRAVENOUS at 21:14

## 2021-05-15 RX ADMIN — CHLORDIAZEPOXIDE HYDROCHLORIDE 50 MG: 25 CAPSULE ORAL at 22:51

## 2021-05-15 RX ADMIN — LORAZEPAM 2 MG: 2 INJECTION INTRAMUSCULAR; INTRAVENOUS at 08:51

## 2021-05-15 RX ADMIN — FUROSEMIDE 5 MG/HR: 10 INJECTION, SOLUTION INTRAMUSCULAR; INTRAVENOUS at 11:09

## 2021-05-15 RX ADMIN — LORAZEPAM 1 MG: 2 INJECTION INTRAMUSCULAR; INTRAVENOUS at 01:47

## 2021-05-15 RX ADMIN — Medication 5 MG: at 00:59

## 2021-05-15 RX ADMIN — AZITHROMYCIN MONOHYDRATE 500 MG: 250 TABLET ORAL at 06:11

## 2021-05-15 RX ADMIN — ENOXAPARIN SODIUM 120 MG: 120 INJECTION SUBCUTANEOUS at 06:09

## 2021-05-15 RX ADMIN — ENOXAPARIN SODIUM 120 MG: 120 INJECTION SUBCUTANEOUS at 18:00

## 2021-05-15 RX ADMIN — POTASSIUM CHLORIDE 40 MEQ: 1500 TABLET, EXTENDED RELEASE ORAL at 22:52

## 2021-05-15 RX ADMIN — SPIRONOLACTONE 25 MG: 25 TABLET, FILM COATED ORAL at 06:12

## 2021-05-15 RX ADMIN — CARVEDILOL 3.12 MG: 3.12 TABLET, FILM COATED ORAL at 16:56

## 2021-05-15 RX ADMIN — DILTIAZEM HYDROCHLORIDE 25 MG: 5 INJECTION INTRAVENOUS at 02:11

## 2021-05-15 RX ADMIN — VALSARTAN 40 MG: 80 TABLET, FILM COATED ORAL at 17:59

## 2021-05-15 RX ADMIN — DIGOXIN 250 MCG: 0.25 INJECTION INTRAMUSCULAR; INTRAVENOUS at 17:50

## 2021-05-15 RX ADMIN — CEFTRIAXONE SODIUM 2 G: 2 INJECTION, POWDER, FOR SOLUTION INTRAMUSCULAR; INTRAVENOUS at 06:11

## 2021-05-15 RX ADMIN — POTASSIUM CHLORIDE 10 MEQ: 7.46 INJECTION, SOLUTION INTRAVENOUS at 11:00

## 2021-05-15 RX ADMIN — FUROSEMIDE 40 MG: 10 INJECTION, SOLUTION INTRAMUSCULAR; INTRAVENOUS at 02:30

## 2021-05-15 RX ADMIN — DILTIAZEM HYDROCHLORIDE 2.5 MG/HR: 5 INJECTION INTRAVENOUS at 10:56

## 2021-05-15 RX ADMIN — LORAZEPAM 1.5 MG: 2 INJECTION INTRAMUSCULAR; INTRAVENOUS at 16:25

## 2021-05-15 RX ADMIN — VALSARTAN 40 MG: 80 TABLET, FILM COATED ORAL at 06:11

## 2021-05-15 RX ADMIN — AMPICILLIN SODIUM AND SULBACTAM SODIUM 3 G: 2; 1 INJECTION, POWDER, FOR SOLUTION INTRAMUSCULAR; INTRAVENOUS at 11:54

## 2021-05-15 ASSESSMENT — LIFESTYLE VARIABLES
ORIENTATION AND CLOUDING OF SENSORIUM: ORIENTED AND CAN DO SERIAL ADDITIONS
AGITATION: *
ORIENTATION AND CLOUDING OF SENSORIUM: CANNOT DO SERIAL ADDITIONS OR IS UNCERTAIN ABOUT DATE
HEADACHE, FULLNESS IN HEAD: VERY MILD
VISUAL DISTURBANCES: NOT PRESENT
NAUSEA AND VOMITING: NO NAUSEA AND NO VOMITING
HEADACHE, FULLNESS IN HEAD: NOT PRESENT
TOTAL SCORE: VERY MILD ITCHING, PINS AND NEEDLES SENSATION, BURNING OR NUMBNESS
HEADACHE, FULLNESS IN HEAD: NOT PRESENT
VISUAL DISTURBANCES: NOT PRESENT
AUDITORY DISTURBANCES: NOT PRESENT
HEADACHE, FULLNESS IN HEAD: NOT PRESENT
AGITATION: NORMAL ACTIVITY
TOTAL SCORE: 18
TOTAL SCORE: 15
AUDITORY DISTURBANCES: VERY MILD HARSHNESS OR ABILITY TO FRIGHTEN
VISUAL DISTURBANCES: NOT PRESENT
TOTAL SCORE: MILD ITCHING, PINS AND NEEDLES SENSATION, BURNING OR NUMBNESS
TOTAL SCORE: 12
PAROXYSMAL SWEATS: BARELY PERCEPTIBLE SWEATING. PALMS MOIST
TOTAL SCORE: 8
TREMOR: MODERATE TREMOR WITH ARMS EXTENDED
NAUSEA AND VOMITING: NO NAUSEA AND NO VOMITING
HEADACHE, FULLNESS IN HEAD: NOT PRESENT
NAUSEA AND VOMITING: NO NAUSEA AND NO VOMITING
TREMOR: *
TREMOR: MODERATE TREMOR WITH ARMS EXTENDED
NAUSEA AND VOMITING: MILD NAUSEA WITH NO VOMITING
AGITATION: *
AUDITORY DISTURBANCES: NOT PRESENT
ORIENTATION AND CLOUDING OF SENSORIUM: ORIENTED AND CAN DO SERIAL ADDITIONS
AUDITORY DISTURBANCES: VERY MILD HARSHNESS OR ABILITY TO FRIGHTEN
PAROXYSMAL SWEATS: *
TOTAL SCORE: 22
ORIENTATION AND CLOUDING OF SENSORIUM: CANNOT DO SERIAL ADDITIONS OR IS UNCERTAIN ABOUT DATE
HEADACHE, FULLNESS IN HEAD: NOT PRESENT
ANXIETY: *
ORIENTATION AND CLOUDING OF SENSORIUM: CANNOT DO SERIAL ADDITIONS OR IS UNCERTAIN ABOUT DATE
TREMOR: *
VISUAL DISTURBANCES: VERY MILD SENSITIVITY
TREMOR: MODERATE TREMOR WITH ARMS EXTENDED
ORIENTATION AND CLOUDING OF SENSORIUM: CANNOT DO SERIAL ADDITIONS OR IS UNCERTAIN ABOUT DATE
AUDITORY DISTURBANCES: NOT PRESENT
TOTAL SCORE: 14
ANXIETY: *
AUDITORY DISTURBANCES: NOT PRESENT
ORIENTATION AND CLOUDING OF SENSORIUM: ORIENTED AND CAN DO SERIAL ADDITIONS
AGITATION: *
PAROXYSMAL SWEATS: *
AGITATION: SOMEWHAT MORE THAN NORMAL ACTIVITY
PAROXYSMAL SWEATS: BEADS OF SWEAT OBVIOUS ON FOREHEAD
AGITATION: SOMEWHAT MORE THAN NORMAL ACTIVITY
ANXIETY: MODERATELY ANXIOUS OR GUARDED, SO ANXIETY IS INFERRED
VISUAL DISTURBANCES: VERY MILD SENSITIVITY
VISUAL DISTURBANCES: VERY MILD SENSITIVITY
NAUSEA AND VOMITING: NO NAUSEA AND NO VOMITING
ANXIETY: *
ANXIETY: *
TREMOR: MODERATE TREMOR WITH ARMS EXTENDED
ORIENTATION AND CLOUDING OF SENSORIUM: CANNOT DO SERIAL ADDITIONS OR IS UNCERTAIN ABOUT DATE
AUDITORY DISTURBANCES: NOT PRESENT
AUDITORY DISTURBANCES: VERY MILD HARSHNESS OR ABILITY TO FRIGHTEN
TOTAL SCORE: VERY MILD ITCHING, PINS AND NEEDLES SENSATION, BURNING OR NUMBNESS
AUDITORY DISTURBANCES: NOT PRESENT
NAUSEA AND VOMITING: NO NAUSEA AND NO VOMITING
TOTAL SCORE: 17
AGITATION: SOMEWHAT MORE THAN NORMAL ACTIVITY
PAROXYSMAL SWEATS: *
VISUAL DISTURBANCES: NOT PRESENT
AGITATION: NORMAL ACTIVITY
ORIENTATION AND CLOUDING OF SENSORIUM: ORIENTED AND CAN DO SERIAL ADDITIONS
PAROXYSMAL SWEATS: *
TOTAL SCORE: MODERATE ITCHING, PINS AND NEEDLES SENSATION, BURNING OR NUMBNESS
ORIENTATION AND CLOUDING OF SENSORIUM: CANNOT DO SERIAL ADDITIONS OR IS UNCERTAIN ABOUT DATE
NAUSEA AND VOMITING: NO NAUSEA AND NO VOMITING
TREMOR: *
PAROXYSMAL SWEATS: BARELY PERCEPTIBLE SWEATING. PALMS MOIST
HEADACHE, FULLNESS IN HEAD: NOT PRESENT
TREMOR: *
VISUAL DISTURBANCES: NOT PRESENT
AUDITORY DISTURBANCES: MODERATE HARSHNESS OR ABILITY TO FRIGHTEN
AGITATION: MODERATELY FIDGETY AND RESTLESS
AGITATION: MODERATELY FIDGETY AND RESTLESS
AUDITORY DISTURBANCES: MILD HARSHNESS OR ABILITY TO FRIGHTEN
NAUSEA AND VOMITING: NO NAUSEA AND NO VOMITING
ORIENTATION AND CLOUDING OF SENSORIUM: ORIENTED AND CAN DO SERIAL ADDITIONS
AGITATION: SOMEWHAT MORE THAN NORMAL ACTIVITY
PAROXYSMAL SWEATS: BEADS OF SWEAT OBVIOUS ON FOREHEAD
HEADACHE, FULLNESS IN HEAD: NOT PRESENT
PAROXYSMAL SWEATS: BEADS OF SWEAT OBVIOUS ON FOREHEAD
NAUSEA AND VOMITING: NO NAUSEA AND NO VOMITING
TOTAL SCORE: VERY MILD ITCHING, PINS AND NEEDLES SENSATION, BURNING OR NUMBNESS
TOTAL SCORE: 7
NAUSEA AND VOMITING: NO NAUSEA AND NO VOMITING
TOTAL SCORE: 12
TOTAL SCORE: MODERATE ITCHING, PINS AND NEEDLES SENSATION, BURNING OR NUMBNESS
PAROXYSMAL SWEATS: *
TREMOR: *
ANXIETY: *
ANXIETY: *
AUDITORY DISTURBANCES: NOT PRESENT
HEADACHE, FULLNESS IN HEAD: NOT PRESENT
PAROXYSMAL SWEATS: BEADS OF SWEAT OBVIOUS ON FOREHEAD
ANXIETY: MODERATELY ANXIOUS OR GUARDED, SO ANXIETY IS INFERRED
VISUAL DISTURBANCES: NOT PRESENT
HEADACHE, FULLNESS IN HEAD: VERY MILD
ANXIETY: *
NAUSEA AND VOMITING: NO NAUSEA AND NO VOMITING
HEADACHE, FULLNESS IN HEAD: VERY MILD
AGITATION: *
VISUAL DISTURBANCES: NOT PRESENT
ANXIETY: MILDLY ANXIOUS
TREMOR: *
VISUAL DISTURBANCES: VERY MILD SENSITIVITY
ORIENTATION AND CLOUDING OF SENSORIUM: CANNOT DO SERIAL ADDITIONS OR IS UNCERTAIN ABOUT DATE
TOTAL SCORE: 25
ANXIETY: *
NAUSEA AND VOMITING: NO NAUSEA AND NO VOMITING
TOTAL SCORE: 12
TREMOR: *
VISUAL DISTURBANCES: MILD SENSITIVITY
PAROXYSMAL SWEATS: *
HEADACHE, FULLNESS IN HEAD: NOT PRESENT
TREMOR: TREMOR NOT VISIBLE BUT CAN BE FELT, FINGERTIP TO FINGERTIP
ANXIETY: *
TOTAL SCORE: 18

## 2021-05-15 ASSESSMENT — ENCOUNTER SYMPTOMS
SHORTNESS OF BREATH: 1
MUSCULOSKELETAL NEGATIVE: 1
CONSTITUTIONAL NEGATIVE: 1
GASTROINTESTINAL NEGATIVE: 1
WHEEZING: 1
EYES NEGATIVE: 1
CARDIOVASCULAR NEGATIVE: 1

## 2021-05-15 ASSESSMENT — PAIN DESCRIPTION - PAIN TYPE
TYPE: ACUTE PAIN

## 2021-05-15 ASSESSMENT — PULMONARY FUNCTION TESTS
EPAP_CMH2O: 5

## 2021-05-15 NOTE — PROGRESS NOTES
Call to Dr. Hurtado; Pt's HR remains afib 140s-150s up to 170s with activity/sitting at EOB. Pt in no apparent distress, BPs 110s-130s, Spo2 92-95% on 3L oxygen. Orders received by FIDE Clarke to give 1 x dose additional digoxin and to call MD with any other needs.

## 2021-05-15 NOTE — PROGRESS NOTES
"Pulmonary Critical Care Progress Note    Date of admission  5/14/2021    Chief Complaint  50 y.o. male admitted 5/14/2021 with Afib with RVR in the setting of new onset systolic heart failure and alcohol withdrawal.     Hospital Course  50 y.o. male who presented 5/14/2021 with worsening leg edema.   This pleasant gentleman has a past medical history significant for varicose veins and had surgical procedures since he was 18 years old including \"vein stripping\". He is known to have chronic leg swelling but over the last few days his swelling is worse. He is also been having increasing weight gain, progressive dyspnea and orthopnea. Now can not walk more than a block without getting SOB. No other symptoms. Denies any fevers, chills, rigors, phlegm production, viral symptoms etc. No GI or urinary symptoms.      Denies any illicit drug use except use of marijuana once or twice/year.    Patient states that he vapes occasionally and has vaped 4 days ago prior to admission.   Drinks 1-2 beers/day and on the weekends can drink upto 8 shots tequila. Never had withdrawals symptoms when stopped drinking (upto 2 weeks).     Interval Problem Update  Reviewed last 24 hour events:  Neuro: Increasing CIWA score with alcohol withdrawal. Stared librium - continuing Ativan per CIWA.   CVS: Afib with RVR - worsened with active withdrawals. Will add cardizem infusion to control rate better cautiously given poor EF. Also transition to lasix infusion for more controlled diuresis.   Pulm: Worsening pulmonary edema. Commenced BiPAP. Continue abx for CAP. Can transition to Unasyn for lower risk of C diff.   Renal: Will place on electrolyte replacement protocol. Start lasix infusion. Check BMP bid.   ID: Holding bronchoscopy - will consider once stable. Continue abx.     Review of Systems  Review of Systems   Constitutional: Negative.    HENT: Negative.    Eyes: Negative.    Respiratory: Positive for shortness of breath and wheezing.  "   Cardiovascular: Negative.    Gastrointestinal: Negative.    Genitourinary: Negative.    Musculoskeletal: Negative.    Skin: Negative.         Vital Signs for last 24 hours   Temp:  [36.6 °C (97.9 °F)-37.7 °C (99.9 °F)] 37.4 °C (99.3 °F)  Pulse:  [] 65  Resp:  [17-67] 24  BP: ()/() 139/68  SpO2:  [88 %-99 %] 96 %    Hemodynamic parameters for last 24 hours       Respiratory Information for the last 24 hours       Physical Exam   Physical Exam  Constitutional:       Appearance: He is obese. He is diaphoretic.   HENT:      Head: Normocephalic and atraumatic.      Nose: Nose normal.      Mouth/Throat:      Mouth: Mucous membranes are moist.   Eyes:      Extraocular Movements: Extraocular movements intact.      Pupils: Pupils are equal, round, and reactive to light.   Cardiovascular:      Rate and Rhythm: Tachycardia present. Rhythm irregular.      Heart sounds: Normal heart sounds.   Pulmonary:      Breath sounds: Wheezing and rales present.   Abdominal:      General: Abdomen is flat.      Palpations: Abdomen is soft.   Neurological:      General: No focal deficit present.      Mental Status: He is alert.   Psychiatric:         Mood and Affect: Mood normal.         Thought Content: Thought content normal.         Medications  Current Facility-Administered Medications   Medication Dose Route Frequency Provider Last Rate Last Admin   • melatonin tablet 5 mg  5 mg Oral Nightly Russ Garcia D.O.   5 mg at 05/15/21 0059   • chlordiazePOXIDE (LIBRIUM) capsule 50 mg  50 mg Oral Q8HRS Shmuel Hurtado M.D.   50 mg at 05/15/21 0953   • digoxin (LANOXIN) injection 250 mcg  250 mcg Intravenous DAILY AT 1800 Stefania Russell M.D.       • DILTIAZem (CARDIZEM) 100 mg in dextrose 5% 100 mL Infusion  0-15 mg/hr Intravenous Continuous Stefania Russell M.D.       • ampicillin/sulbactam (UNASYN) 3 g in  mL IVPB  3 g Intravenous Q6HRS Shmuel Hurtado M.D.       • furosemide (LASIX) 100 mg in NS  100 mL infusion  10 mg/hr Intravenous Continuous Shmuel Hurtado M.D.       • MD Alert...ICU Electrolyte Replacement per Pharmacy   Other PHARMACY TO DOSE Shmuel Hurtado M.D.       • potassium chloride (KCL) ivpb 10 mEq  10 mEq Intravenous Q HOUR Shmuel Hurtado M.D.       • azithromycin (ZITHROMAX) tablet 500 mg  500 mg Oral DAILY Shmuel Hurtado M.D.   500 mg at 05/15/21 0611   • spironolactone (ALDACTONE) tablet 25 mg  25 mg Oral Q DAY Elvis Cameron M.D.   25 mg at 05/15/21 0612   • valsartan (DIOVAN) tablet 40 mg  40 mg Oral TWICE DAILY Elvis Cameron M.D.   40 mg at 05/15/21 0611   • carvedilol (COREG) tablet 3.125 mg  3.125 mg Oral BID WITH MEALS Elvis Cameron M.D.   3.125 mg at 05/15/21 0721   • enoxaparin (LOVENOX) inj 120 mg  120 mg Subcutaneous Q12HRS Elvis Cameron M.D.   120 mg at 05/15/21 0609   • LORazepam (ATIVAN) tablet 0.5 mg  0.5 mg Oral Q4HRS PRN Shmuel Hurtado M.D.       • LORazepam (ATIVAN) tablet 1 mg  1 mg Oral Q4HRS PRN Shmuel Hurtado M.D.        Or   • LORazepam (ATIVAN) injection 0.5 mg  0.5 mg Intravenous Q4HRS PRN Shmuel Hurtado M.D.       • LORazepam (ATIVAN) tablet 2 mg  2 mg Oral Q2HRS PRN Shmuel Hurtado M.D.        Or   • LORazepam (ATIVAN) injection 1 mg  1 mg Intravenous Q2HRS PRN Shmuel Hurtado M.D.   1 mg at 05/15/21 0147   • LORazepam (ATIVAN) tablet 3 mg  3 mg Oral Q HOUR PRN Shmuel Hurtado M.D.        Or   • LORazepam (ATIVAN) injection 1.5 mg  1.5 mg Intravenous Q HOUR PRN Shmuel Hurtado M.D.   1.5 mg at 05/15/21 1023   • LORazepam (ATIVAN) tablet 4 mg  4 mg Oral Q15 MIN PRN Shmuel Hurtado M.D.        Or   • LORazepam (ATIVAN) injection 2 mg  2 mg Intravenous Q15 MIN PRN Shmuel Hurtado M.D.   2 mg at 05/15/21 0851       Fluids    Intake/Output Summary (Last 24 hours) at 5/15/2021 1045  Last data filed at 5/15/2021 1000  Gross per 24 hour   Intake 840 ml   Output 6950 ml   Net -6110 ml       Laboratory          Recent Labs      05/14/21  1125 05/15/21  0600   SODIUM 140 138   POTASSIUM 3.7 3.7   CHLORIDE 103 96   CO2 23 29   BUN 10 7*   CREATININE 0.88 0.76   CALCIUM 8.5 8.4     Recent Labs     05/14/21  1125 05/15/21  0600   ALTSGPT 69* 63*   ASTSGOT 74* 61*   ALKPHOSPHAT 109* 103*   TBILIRUBIN 0.7 1.1   GLUCOSE 132* 117*     Recent Labs     05/14/21  1125 05/15/21  0600   WBC 8.3 10.0   NEUTSPOLYS 72.70* 81.40*   LYMPHOCYTES 10.70* 6.80*   MONOCYTES 13.40 9.90   EOSINOPHILS 1.40 0.40   BASOPHILS 1.40 0.90   ASTSGOT 74* 61*   ALTSGPT 69* 63*   ALKPHOSPHAT 109* 103*   TBILIRUBIN 0.7 1.1     Recent Labs     05/14/21  1125 05/15/21  0600   RBC 4.73 4.68*   HEMOGLOBIN 16.2 15.9   HEMATOCRIT 47.9 47.6   PLATELETCT 168 151*       Imaging  X-Ray:  I have personally reviewed the images and compared with prior images.    Assessment/Plan  Alcohol withdrawal (HCC)  Assessment & Plan  - Increasing CIWA scores overnight. Required few doses of ativan over the course of last night.   - Certainly the hyperadrenergic state with active withdrawals worsened the acute HFrEF further complicated by Afib with RVR.   - Will add chlordiazepoxide to provide a more steady state and less reliance on symptoms triggered therapy using CIWA.         Acute respiratory failure with hypoxia (HCC)  Assessment & Plan  - Presented with acute hypoxic respiratory failure which is likely related to asymmetric pulmonary edema in the setting of acute systolic heart failure, Gp II Pulmonary hypertension and obesity.   - No infectious symptoms, negative WBC count and procalcitonin, are indicative of a sterile/non infectious process.   - Pulmonary edema worsened overnight. Will increase the diuresis dose. Can also add Bumex.   - Placed the patient on BiPAP to improve oxygenation; to decrease work of breathing; and to improve left ventricular function.  - Will treat for heart failure and monitor closely. Should improve with diuresis.    Acute systolic heart failure  ([facility])  Assessment & Plan  - Patient presented with acute systolic heart failure.   - The asymmetric GGOs and consolidations on admission on the CT chest is unusual but possible.   - Worsening pulmonary edema over the course of last night.  - Increased the diuresis dose. Can further add Bumex.    - Discussed with Cardiology    - Will continue with Valsartan and digoxin. Dapagliflozin and Entresto on discharge.     A-fib ([facility])  Assessment & Plan  - Presented with afib with RVR. Further complicated by alcohol withdrawal.   - Will continue digoxin. Avoiding diltiazem because of acute CHF. However since not able to control rate with Digoxin alone I discussed with cardiology to add diltiazem to control rate a little bit better.   - Will defer to cardiology and follow recommendations.          VTE:  Lovenox  Ulcer: PPI  Lines: None    I have performed a physical exam and reviewed and updated ROS and Plan today (5/15/2021). In review of yesterday's note (5/14/2021), there are no changes except as documented above.     Discussed patient condition and risk of morbidity and/or mortality with Family, RN, RT, Therapies, Pharmacy and cardiology  The patient remains critically ill.  Critical care time = 64 minutes in directly providing and coordinating critical care and extensive data review.  No time overlap and excludes procedures.

## 2021-05-15 NOTE — ASSESSMENT & PLAN NOTE
- Presented with afib with RVR. Further complicated by alcohol withdrawal.   - Started on Amiodarone. Rate better controlled with diltiazem and amiodarone.

## 2021-05-15 NOTE — FLOWSHEET NOTE
05/15/21 1413   Events/Summary/Plan   Protective Device Bipap Gels   NIV for Respiratory Failure   Non-Invasive Vent Mode ST   IPAP (cmH2O) 15   EPAP (cm H2O) 5   FiO2 50   Alarms Set / Checked Yes   Non-Invasive Temp (C) 30   Respiratory Rate Patient 34   Spontaneous Vt (ml) 645   Spontaneous Ve (LPM) 19.2   NIV Interface Full Mask

## 2021-05-15 NOTE — FLOWSHEET NOTE
05/15/21 0901   Events/Summary/Plan   Events/Summary/Plan BiPAP placed on patient for increased WOB.   Skin Inspection Respiratory Device Intact;Skin Barrier Used   Location   (bridge of nose and cheeks.)   Protective Device Bipap Gels   NIV for Respiratory Failure   $ NIV Charge Yes   Non-Invasive Vent Mode ST   IPAP (cmH2O) 15   EPAP (cm H2O) 5   FiO2 70   Alarms Set / Checked Yes   Non-Invasive Temp (C) 30   Respiratory Rate Patient 24   Spontaneous Vt (ml) 850   Spontaneous Ve (LPM) 23.2   NIV Interface Full Mask

## 2021-05-15 NOTE — PROGRESS NOTES
Pt arrival to unit; POC discussed. Pt in a-fib on telemetry rate 130s-150s. BPs 120s-130s SBP. MD aware. Ordered meds given. Pt in no apparent distress, only complaint is mild shortness of breath, o2 at 3 L via NC with SPo2 95%. Pt instructed to notify RN with any and all needs and agrees. Will continue with care.

## 2021-05-15 NOTE — CARE PLAN
Problem: Knowledge Deficit - Standard  Goal: Patient and family/care givers will demonstrate understanding of plan of care, disease process/condition, diagnostic tests and medications  Outcome: Progressing     Problem: Optimal Care for Alcohol Withdrawal  Goal: Optimal Care for the alcohol withdrawal patient  Outcome: Progressing     Problem: Lifestyle Changes  Goal: Patient's ability to identify lifestyle changes and available resources to help reduce recurrence of condition will improve  Outcome: Progressing   The patient is Watcher - Medium risk of patient condition declining or worsening         Progress made toward(s) clinical / shift goals:  implementing POC/medications

## 2021-05-15 NOTE — ASSESSMENT & PLAN NOTE
- Presented with acute hypoxic respiratory failure which is likely related to asymmetric pulmonary edema in the setting of acute systolic heart failure, Gp II Pulmonary hypertension and obesity. Improving.   - No infectious symptoms, negative WBC count and procalcitonin, are indicative of a sterile/non infectious process.   - Pulmonary edema worsened and patient was started on lasix infusion. Good response.    - Placed the patient on BiPAP for a little period of time to improve oxygenation; to decrease work of breathing; and to improve left ventricular function. Now weaned off.

## 2021-05-15 NOTE — PROGRESS NOTES
Reason for consultation /admission : HFrEF, AF RVR    Diuresing well.  2300+ cc urine output overnight.  Still in significant pulmonary edema and had to be switched to BiPAP last night  On 70% O2 currently    Received total of 1 mg digoxin loading yesterday.  Remains in A. fib with RVR.  Ventricular rate mostly in the 120s to 130s with occasional spike to the 150s to 160    Probably  In ETOH withdrawal as well  Receiving benzodiazepine    Blood pressure systolic mostly in the 120+    ECHO by my reviewed EF more in the 25-30%    Review of systems;    Difficult to perform due to respiratory and mental status      Temp:  [36.6 °C (97.9 °F)-37.7 °C (99.9 °F)] 37.4 °C (99.3 °F)  Pulse:  [] 71  Resp:  [17-67] 24  BP: ()/() 123/89  SpO2:  [88 %-99 %] 95 %  GENERAL on BiPAP, arousable, mild to moderately dyspnic   Head atraumatic, normocephalic  ENT neck supple, + JVD  Lung some retraction, distant sound, bilateral rales  Heart tachycardic, irregularly irregular tensile , no audible murmur, gallop or rub  Abd mildly distended, no tenderness, mass or bruits  Ext 2+ edema with chronic stasis changes  Skin hyperpigmentation both legs   Musculoskeletal no deformity  Neuro drowsy/sedated but arousable, moving all extremities  Psych unable to perform    Monitor reviewed by me showed AF with RVR.    Labs:  Tn 26    Results for JAY BARRY (MRN [id number]) as of 5/15/2021 09:58   Ref. Range 5/15/2021 06:00   Sodium Latest Ref Range: 135 - 145 mmol/L 138   Potassium Latest Ref Range: 3.6 - 5.5 mmol/L 3.7   Chloride Latest Ref Range: 96 - 112 mmol/L 96   Co2 Latest Ref Range: 20 - 33 mmol/L 29   Anion Gap Latest Ref Range: 7.0 - 16.0  13.0   Glucose Latest Ref Range: 65 - 99 mg/dL 117 (H)   Bun Latest Ref Range: 8 - 22 mg/dL 7 (L)   Creatinine Latest Ref Range: 0.50 - 1.40 mg/dL 0.76   GFR If  Latest Ref Range: >60 mL/min/1.73 m 2 >60   GFR If Non  Latest Ref Range: >60  mL/min/1.73 m 2 >60   Calcium Latest Ref Range: 8.4 - 10.2 mg/dL 8.4   AST(SGOT) Latest Ref Range: 12 - 45 U/L 61 (H)   ALT(SGPT) Latest Ref Range: 2 - 50 U/L 63 (H)   Alkaline Phosphatase Latest Ref Range: 30 - 99 U/L 103 (H)   Total Bilirubin Latest Ref Range: 0.1 - 1.5 mg/dL 1.1   Albumin Latest Ref Range: 3.2 - 4.9 g/dL 3.6   Total Protein Latest Ref Range: 6.0 - 8.2 g/dL 6.2   Globulin Latest Ref Range: 1.9 - 3.5 g/dL 2.6   A-G Ratio Latest Units: g/dL 1.4       Assessment and plans    1.  Acute systolic heart failure probably alcoholic cardiomyopathy versus tachycardia induced  Continue diuresis.  Appears to be diuresing well on current dose of furosemide and spironolactone.  F/U BMP.  Needs better rate control of his A. fib.  Will add diltiazem infusion. Continue maintenance dose of digoxin.  Continue ARB a nd up-titrate as BP allows.    2.  Atrial fibrillation with rapid ventricle response  Rate still not controlled by 1 mg loading digoxin.  He is to 188 pounds may need slightly higher than 1 mg loading.  We will cautiously try diltiazem infusion (poor LVSF) with target ventricular rate in the low 100s range.  Part of his tachycardia could be from alcoholic withdrawal.  Agree with intensivist on trying sedation.  Continue anticoagulation.  Onset of his atrial fibrillation is unclear.  Should cardioversion to be considered if ventricular rate remain difficult to control, will need to perform ALISE.  With his current respiratory status, this would however be at relatively high risk for respiratory complication.    3.  ETOH abuse with probable withdrawal  Per intensivist    Will follow    Please note that this dictation was created using voice recognition software. I have worked with consultants from the vendor as well as technical experts from [employer] to optimize the interface. I have made every reasonable attempt to correct obvious errors, but I expect that there are errors of grammar and possibly content  I did not discover before finalizing the note

## 2021-05-15 NOTE — PROGRESS NOTES
Received report from Charlotte ELIZALDE. Assumed pt care. Pt a/o x4, no complaints of pain, dizziness/lightheadedness or shortness of breath. Heart rhythm remains a-fib 140s-160s. MD notified, orders received and placed (see MAR). Pt needs met at this time. All safety precautions in place.

## 2021-05-15 NOTE — PROGRESS NOTES
Patient resting now. Blood pressure better (124/90), but heart rate continues elevated (153). Oxygen decreased to 10L. Still occasionally restless and tremulous. On a couple of occasions has been in a cold sweat.

## 2021-05-15 NOTE — FLOWSHEET NOTE
05/15/21 1449   Events/Summary/Plan   Events/Summary/Plan Taken off BiPAP and placed on 15 lpm NRM mask.  Discussed with RN   Vital Signs   Pulse 73   Respiration 14   Pulse Oximetry 97 %   $ Pulse Oximetry (Spot Check) Yes   Respiratory Assessment   Level of Consciousness Alert   Chest Exam   Work Of Breathing / Effort Shallow;Tachypnea   Oxygen   O2 (LPM) 15   O2 Delivery Device Non-Rebreather Mask

## 2021-05-15 NOTE — CARE PLAN
Problem: Psychosocial  Goal: Patient's level of anxiety will decrease  Outcome: Progressing  Flowsheets (Taken 5/15/2021 3852)  Decrease Anxiety Level:   Pharmacologic management per MD order   Encouraged support system participation   Implemented stimuli reduction, calming techniques  Note: CIWA Protocol followed, Ativan administered IVP per CIWA score.      Problem: Fall Risk  Goal: Patient will remain free from falls  Outcome: Progressing  Note: Pt cooperating with all safety precautions, bed alarm on, Pt VO of condom catheter, Pt within view of nursing station, all fall risk precautions in place. Following CIWA protocol for ETOH withdrawal.

## 2021-05-15 NOTE — H&P
"Pulmonary and Critical Care History & Physical Note    Date of Service  5/14/2021    Primary Care Physician  Ean Macario M.D.    Consultants  Elvis Cameron MD    Referring Physician  Otto Isabel MD    Reason for Consultation  Afib with RVR  Acute systolic heart failure      Chief Complaint  Chief Complaint   Patient presents with   • Peripheral Edema   • Shortness of Breath       History of Presenting Illness  50 y.o. male who presented 5/14/2021 with worsening leg edema.   This pleasant gentleman has a past medical history significant for varicose veins and had surgical procedures since he was 18 years old including \"vein stripping\". He is known to have chronic leg swelling but over the last few days his swelling is worse. He is also been having increasing weight gain, progressive dyspnea and orthopnea. Now can not walk more than a block without getting SOB. No other symptoms. Denies any fevers, chills, rigors, phlegm production, viral symptoms etc. No GI or urinary symptoms.     Denies any illicit drug use except use of marijuana once or twice/year.    Patient states that he vapes occasionally and has vaped 4 days ago prior to admission.   Drinks 1-2 beers/day and on the weekends can drink upto 8 shots tequila. Never had withdrawals symptoms when stopped drinking (upto 2 weeks).       Code Status  Full code        Review of Systems  Review of Systems   Constitutional: Negative for chills, diaphoresis and fever.   HENT: Negative.    Eyes: Negative.    Respiratory: Positive for shortness of breath.    Cardiovascular: Positive for orthopnea, leg swelling and PND.   Gastrointestinal: Negative.    Genitourinary: Negative.    Musculoskeletal: Negative.    Skin: Negative.    Neurological: Negative.    Psychiatric/Behavioral: Negative.        Past Medical History  - Varicose veins    Family History  No significant family history    Social History   reports that he has quit smoking. His smoking use included " cigarettes. He has never used smokeless tobacco. He reports current alcohol use. He reports that he does not use drugs.    Allergies  No Known Allergies    Medications  Prior to Admission Medications   Prescriptions Last Dose Informant Patient Reported? Taking?   Ascorbic Acid (VITAMIN C GUMMIE PO) 5/13/2021 at am Patient Yes Yes   Sig: Take 3-4 Tablets by mouth every day.   NON SPECIFIED 5/11/2021 at prn Patient Yes Yes   Sig: Take 2 Tablets by mouth 2 times a day as needed (swelling). OTC Diuretic   cephALEXin (KEFLEX) 500 MG Cap 5/13/2021 at pm Patient Yes Yes   Sig: Take 500 mg by mouth 3 times a day. 10 day course   fluticasone (FLONASE) 50 MCG/ACT nasal spray last week at prn Patient Yes Yes   Sig: Administer 1 Spray into affected nostril(S) 2 times a day as needed (allergy).   multivitamin-iron-minerals-folic acid (CENTRUM) chewable tablet 5/10/2021 at unk Patient Yes Yes   Sig: Chew 2 Tablets every day.   predniSONE (DELTASONE) 20 MG Tab Not Taking at Unknown time Patient No No   Sig: Take 3 tabs at once PO daily x 5 days, then take 2 tabs at once daily x 3 days, then take 1 tab PO daily x 2 days   Patient not taking: Reported on 5/14/2021      Facility-Administered Medications: None       Physical Exam  Temp:  [36.6 °C (97.9 °F)] 36.6 °C (97.9 °F)  Pulse:  [] 138  Resp:  [17-30] 30  BP: ()/(63-99) 138/77  SpO2:  [92 %-96 %] 94 %    Physical Exam  Constitutional:       Appearance: Normal appearance. He is obese.   HENT:      Head: Normocephalic and atraumatic.      Nose: Nose normal.      Mouth/Throat:      Mouth: Mucous membranes are moist.   Eyes:      Extraocular Movements: Extraocular movements intact.      Pupils: Pupils are equal, round, and reactive to light.   Cardiovascular:      Rate and Rhythm: Tachycardia present. Rhythm irregular.      Heart sounds: Normal heart sounds.   Pulmonary:      Breath sounds: Rales present.   Abdominal:      General: Abdomen is flat.      Palpations:  Abdomen is soft.   Musculoskeletal:      Cervical back: Normal range of motion.   Neurological:      Mental Status: He is alert.         Laboratory:  Recent Labs     05/14/21  1125   WBC 8.3   RBC 4.73   HEMOGLOBIN 16.2   HEMATOCRIT 47.9   .3*   MCH 34.2*   MCHC 33.8   RDW 53.0*   PLATELETCT 168   MPV 9.7     Recent Labs     05/14/21  1125   SODIUM 140   POTASSIUM 3.7   CHLORIDE 103   CO2 23   GLUCOSE 132*   BUN 10   CREATININE 0.88   CALCIUM 8.5     Recent Labs     05/14/21  1125   ALTSGPT 69*   ASTSGOT 74*   ALKPHOSPHAT 109*   TBILIRUBIN 0.7   GLUCOSE 132*         Recent Labs     05/14/21  1125   NTPROBNP 921*         Recent Labs     05/14/21  1125   TROPONINT 30*       Imaging:  EC-ECHOCARDIOGRAM COMPLETE W/ CONT   Final Result      CT-CTA CHEST PULMONARY ARTERY W/ RECONS   Final Result      Right upper greater than lower lobe consolidation and groundglass is more likely from pneumonia than edema. Bacterial pneumonia is favored over Covid given the lack of left lung involvement. This is further supported by isolated right hilar    lymphadenopathy which most likely is reactive. Consider follow-up to resolution      Moderate multichamber cardiac enlargement and there is some interlobular septal thickening which is compatible with interstitial edema      Small right pleural effusion      Remote granulomatous disease      No CT evidence of pulmonary thromboembolism to the proximal segmental levels      Pectus excavatum deformity      DX-CHEST-PORTABLE (1 VIEW)   Final Result      1.  Right upper lobe airspace process suspicious for pneumonia      2.  Enlarged cardiac silhouette            Assessment/Plan:  I anticipate this patient will require at least two midnights for appropriate medical management, necessitating inpatient admission.    Acute systolic heart failure (HCC)  Assessment & Plan  - Patient presented with acute systolic heart failure.   - The asymmetric GGOs and consolidations on the CT chest is  very unusual but possible.   - Cardiology following.   - Will continue with Valsartan and digoxin. Dapagliflozin and Entresto on discharge.     Acute respiratory failure with hypoxia ([facility])  Assessment & Plan  - Presented with acute hypoxic respiratory failure which is likely related to asymmetric pulmonary edema in the setting of acute systolic heart failure, Gp II Pulmonary hypertension and obesity.   - No infectious symptoms, negative WBC count and procalcitonin, are indicative of a sterile/non infectious process.   - Will treat for heart failure and monitor closely. Should improve with diuresis. If not, we should consider for bronchoscopy. Avoiding moderate sedation in the setting of acute CHF acutely.     A-fib (HCC)  Assessment & Plan  - Presented with afib with RVR.  - Will continue digoxin. Avoiding diltiazem because of acute CHF.   - Can consider beta blockers tomorrow as per cardiology.       VTE:  Lovenox  Ulcer: Not indicated.   Lines: None     I have performed a physical exam and reviewed and updated ROS and Plan today.      Discussed patient condition and risk of morbidity and/or mortality with Family, RN, RT and Therapies  The patient remains critically ill.  Critical care time = 71 minutes in directly providing and coordinating critical care and extensive data review.  No time overlap and excludes procedures.

## 2021-05-15 NOTE — PROGRESS NOTES
Dr Hurtado called and notified that Pt is now on 10 L NRB mask, RR 20-30, accessory muscle use noted, HR Afib 120-140's, Pt awake and alert,restless and anxious,A/O X4, following CIWA protocol see MAR. Orders received for one time IV push of Lasix. Administered as ordered, see MAR. Closely monitoring Pt.

## 2021-05-15 NOTE — ASSESSMENT & PLAN NOTE
- Increasing CIWA scores overnight. Required few doses of ativan over the course of last night.   - Certainly the hyperadrenergic state with active withdrawals worsened the acute HFrEF further complicated by Afib with RVR.   - Will add chlordiazepoxide to provide a more steady state and less reliance on symptoms triggered therapy using CIWA.

## 2021-05-15 NOTE — FLOWSHEET NOTE
05/15/21 1004   Events/Summary/Plan   Events/Summary/Plan FiO2 titrated to 50%,  RN aware.   Protective Device Bipap Gels   NIV for Respiratory Failure   Non-Invasive Vent Mode ST   IPAP (cmH2O) 15   EPAP (cm H2O) 5   FiO2 50   Alarms Set / Checked Yes   Non-Invasive Temp (C) 30   Respiratory Rate Patient 27   Spontaneous Vt (ml) 735   Spontaneous Ve (LPM) 18.4   NIV Interface Full Mask

## 2021-05-15 NOTE — CARE PLAN
Problem: Knowledge Deficit - Standard  Goal: Patient and family/care givers will demonstrate understanding of plan of care, disease process/condition, diagnostic tests and medications  Outcome: Progressing     Problem: Optimal Care for Alcohol Withdrawal  Goal: Optimal Care for the alcohol withdrawal patient  Outcome: Progressing     Problem: Seizure Precautions  Goal: Implementation of seizure precautions  Outcome: Progressing     Problem: Lifestyle Changes  Goal: Patient's ability to identify lifestyle changes and available resources to help reduce recurrence of condition will improve  Outcome: Progressing     Problem: Psychosocial  Goal: Patient's level of anxiety will decrease  Outcome: Progressing  Goal: Spiritual and cultural needs incorporated into hospitalization  Outcome: Progressing     Problem: Risk for Aspiration  Goal: Patient's risk for aspiration will be absent or decrease  Outcome: Progressing     Problem: Fall Risk  Goal: Patient will remain free from falls  Outcome: Progressing   The patient is Watcher - Medium risk of patient condition declining or worsening    Shift Goals  Clinical Goals: Patient's HR will be better controlled.    Progress made toward(s) clinical / shift goals:      Patient is not progressing towards the following goals:

## 2021-05-15 NOTE — PROGRESS NOTES
Patient states that he cannot sleep and that he usually takes Melatonin at home. Order obtained and medication given.

## 2021-05-15 NOTE — PROGRESS NOTES
Patient resting quietly in bed. Denies any issues at the present time. Encouraged him to keep his oral fluid intake down somewhat since he is receiving IV diuretics. I explained the reasoning for this and the function of furosemide (lasix). He states understanding. UOP good of clear, yellow urine. Confesses that he is very anxious about being in the hospital for the first time ever. I reassured him that we were here for him and just a few steps away. I promised to explain everything before doing anything. Voices appreciation. Pleasant gentleman.

## 2021-05-15 NOTE — PROGRESS NOTES
Patient calls out. When I went to his room, he was more SOB than previously and he stated that his throat felt tight. Attempting to cough phlegm up. Wheezing audibly now. Wheezing in upper lobes and rales to inferior upon auscultation. Dr. Garcia notified. Medications given as ordered (diltiazem, furosemide) and ativan per [facility] protocol. Chest x-ray also done. Patient has moved from nasal cannula at 2L to non-rebreather mask @ 15L in attempts to keep oxygen levels up. Patient is very anxious because he is having difficulty with his breathing.

## 2021-05-15 NOTE — ASSESSMENT & PLAN NOTE
- Patient presented with acute systolic heart failure.   - The asymmetric GGOs and consolidations on admission on the CT chest is unusual but possible.   - Worsening pulmonary edema and required to be placed on lasix infusion. Currently running at 5 mg/hr. -10L since admission.   - Will continue with Valsartan and digoxin. Dapagliflozin and Entresto on discharge.

## 2021-05-16 ENCOUNTER — HOSPITAL ENCOUNTER (INPATIENT)
Facility: MEDICAL CENTER | Age: 51
LOS: 4 days | DRG: 224 | End: 2021-05-20
Attending: INTERNAL MEDICINE | Admitting: INTERNAL MEDICINE
Payer: COMMERCIAL

## 2021-05-16 VITALS
HEIGHT: 72 IN | WEIGHT: 288.8 LBS | DIASTOLIC BLOOD PRESSURE: 78 MMHG | RESPIRATION RATE: 26 BRPM | OXYGEN SATURATION: 92 % | BODY MASS INDEX: 39.12 KG/M2 | TEMPERATURE: 98.6 F | HEART RATE: 108 BPM | SYSTOLIC BLOOD PRESSURE: 121 MMHG

## 2021-05-16 DIAGNOSIS — I50.21 ACUTE SYSTOLIC HEART FAILURE (HCC): ICD-10-CM

## 2021-05-16 DIAGNOSIS — I48.91 ATRIAL FIBRILLATION WITH RVR (HCC): ICD-10-CM

## 2021-05-16 PROBLEM — G47.33 OSA (OBSTRUCTIVE SLEEP APNEA): Status: ACTIVE | Noted: 2021-05-16

## 2021-05-16 PROBLEM — I47.20 VENTRICULAR TACHYARRHYTHMIA (HCC): Status: ACTIVE | Noted: 2021-05-16

## 2021-05-16 LAB
ALBUMIN SERPL BCP-MCNC: 3.2 G/DL (ref 3.2–4.9)
ALBUMIN SERPL BCP-MCNC: 3.5 G/DL (ref 3.2–4.9)
ALBUMIN/GLOB SERPL: 1.3 G/DL
ALBUMIN/GLOB SERPL: 1.3 G/DL
ALP SERPL-CCNC: 105 U/L (ref 30–99)
ALP SERPL-CCNC: 97 U/L (ref 30–99)
ALT SERPL-CCNC: 52 U/L (ref 2–50)
ALT SERPL-CCNC: 56 U/L (ref 2–50)
ANION GAP SERPL CALC-SCNC: 12 MMOL/L (ref 7–16)
ANION GAP SERPL CALC-SCNC: 15 MMOL/L (ref 7–16)
AST SERPL-CCNC: 53 U/L (ref 12–45)
AST SERPL-CCNC: 59 U/L (ref 12–45)
BASOPHILS # BLD AUTO: 0.8 % (ref 0–1.8)
BASOPHILS # BLD AUTO: 0.9 % (ref 0–1.8)
BASOPHILS # BLD: 0.07 K/UL (ref 0–0.12)
BASOPHILS # BLD: 0.09 K/UL (ref 0–0.12)
BILIRUB SERPL-MCNC: 1.2 MG/DL (ref 0.1–1.5)
BILIRUB SERPL-MCNC: 1.2 MG/DL (ref 0.1–1.5)
BUN SERPL-MCNC: 10 MG/DL (ref 8–22)
BUN SERPL-MCNC: 13 MG/DL (ref 8–22)
CA-I SERPL-SCNC: 1.03 MMOL/L (ref 1.1–1.3)
CALCIUM SERPL-MCNC: 8.2 MG/DL (ref 8.4–10.2)
CALCIUM SERPL-MCNC: 8.8 MG/DL (ref 8.4–10.2)
CHLORIDE SERPL-SCNC: 95 MMOL/L (ref 96–112)
CHLORIDE SERPL-SCNC: 96 MMOL/L (ref 96–112)
CO2 SERPL-SCNC: 29 MMOL/L (ref 20–33)
CO2 SERPL-SCNC: 31 MMOL/L (ref 20–33)
CREAT SERPL-MCNC: 0.78 MG/DL (ref 0.5–1.4)
CREAT SERPL-MCNC: 0.99 MG/DL (ref 0.5–1.4)
EOSINOPHIL # BLD AUTO: 0.32 K/UL (ref 0–0.51)
EOSINOPHIL # BLD AUTO: 0.32 K/UL (ref 0–0.51)
EOSINOPHIL NFR BLD: 3.2 % (ref 0–6.9)
EOSINOPHIL NFR BLD: 3.6 % (ref 0–6.9)
ERYTHROCYTE [DISTWIDTH] IN BLOOD BY AUTOMATED COUNT: 50.9 FL (ref 35.9–50)
ERYTHROCYTE [DISTWIDTH] IN BLOOD BY AUTOMATED COUNT: 51.2 FL (ref 35.9–50)
GLOBULIN SER CALC-MCNC: 2.4 G/DL (ref 1.9–3.5)
GLOBULIN SER CALC-MCNC: 2.8 G/DL (ref 1.9–3.5)
GLUCOSE SERPL-MCNC: 108 MG/DL (ref 65–99)
GLUCOSE SERPL-MCNC: 97 MG/DL (ref 65–99)
HCT VFR BLD AUTO: 46.3 % (ref 42–52)
HCT VFR BLD AUTO: 53.4 % (ref 42–52)
HGB BLD-MCNC: 15.7 G/DL (ref 14–18)
HGB BLD-MCNC: 17.6 G/DL (ref 14–18)
IMM GRANULOCYTES # BLD AUTO: 0.04 K/UL (ref 0–0.11)
IMM GRANULOCYTES # BLD AUTO: 0.06 K/UL (ref 0–0.11)
IMM GRANULOCYTES NFR BLD AUTO: 0.4 % (ref 0–0.9)
IMM GRANULOCYTES NFR BLD AUTO: 0.7 % (ref 0–0.9)
INR PPP: 1.18 (ref 0.87–1.13)
LACTATE BLD-SCNC: 1.9 MMOL/L (ref 0.5–2)
LYMPHOCYTES # BLD AUTO: 0.78 K/UL (ref 1–4.8)
LYMPHOCYTES # BLD AUTO: 1.09 K/UL (ref 1–4.8)
LYMPHOCYTES NFR BLD: 10.9 % (ref 22–41)
LYMPHOCYTES NFR BLD: 8.9 % (ref 22–41)
MAGNESIUM SERPL-MCNC: 1.9 MG/DL (ref 1.5–2.5)
MAGNESIUM SERPL-MCNC: 2.1 MG/DL (ref 1.5–2.5)
MCH RBC QN AUTO: 33.5 PG (ref 27–33)
MCH RBC QN AUTO: 34.4 PG (ref 27–33)
MCHC RBC AUTO-ENTMCNC: 33 G/DL (ref 33.7–35.3)
MCHC RBC AUTO-ENTMCNC: 33.9 G/DL (ref 33.7–35.3)
MCV RBC AUTO: 101.5 FL (ref 81.4–97.8)
MCV RBC AUTO: 101.7 FL (ref 81.4–97.8)
MONOCYTES # BLD AUTO: 1.08 K/UL (ref 0–0.85)
MONOCYTES # BLD AUTO: 1.21 K/UL (ref 0–0.85)
MONOCYTES NFR BLD AUTO: 12.1 % (ref 0–13.4)
MONOCYTES NFR BLD AUTO: 12.3 % (ref 0–13.4)
NEUTROPHILS # BLD AUTO: 6.49 K/UL (ref 1.82–7.42)
NEUTROPHILS # BLD AUTO: 7.21 K/UL (ref 1.82–7.42)
NEUTROPHILS NFR BLD: 72.5 % (ref 44–72)
NEUTROPHILS NFR BLD: 73.7 % (ref 44–72)
NRBC # BLD AUTO: 0 K/UL
NRBC # BLD AUTO: 0 K/UL
NRBC BLD-RTO: 0 /100 WBC
NRBC BLD-RTO: 0 /100 WBC
PHOSPHATE SERPL-MCNC: 3.2 MG/DL (ref 2.5–4.5)
PHOSPHATE SERPL-MCNC: 4.1 MG/DL (ref 2.5–4.5)
PLATELET # BLD AUTO: 155 K/UL (ref 164–446)
PLATELET # BLD AUTO: 163 K/UL (ref 164–446)
PMV BLD AUTO: 10.2 FL (ref 9–12.9)
PMV BLD AUTO: 10.2 FL (ref 9–12.9)
POTASSIUM SERPL-SCNC: 4.1 MMOL/L (ref 3.6–5.5)
POTASSIUM SERPL-SCNC: 4.3 MMOL/L (ref 3.6–5.5)
PROT SERPL-MCNC: 5.6 G/DL (ref 6–8.2)
PROT SERPL-MCNC: 6.3 G/DL (ref 6–8.2)
PROTHROMBIN TIME: 14.7 SEC (ref 12–14.6)
RBC # BLD AUTO: 4.56 M/UL (ref 4.7–6.1)
RBC # BLD AUTO: 5.25 M/UL (ref 4.7–6.1)
SODIUM SERPL-SCNC: 139 MMOL/L (ref 135–145)
SODIUM SERPL-SCNC: 139 MMOL/L (ref 135–145)
WBC # BLD AUTO: 10 K/UL (ref 4.8–10.8)
WBC # BLD AUTO: 8.8 K/UL (ref 4.8–10.8)

## 2021-05-16 PROCEDURE — 83605 ASSAY OF LACTIC ACID: CPT

## 2021-05-16 PROCEDURE — 99291 CRITICAL CARE FIRST HOUR: CPT | Performed by: INTERNAL MEDICINE

## 2021-05-16 PROCEDURE — 700102 HCHG RX REV CODE 250 W/ 637 OVERRIDE(OP): Performed by: HOSPITALIST

## 2021-05-16 PROCEDURE — 700111 HCHG RX REV CODE 636 W/ 250 OVERRIDE (IP): Performed by: HOSPITALIST

## 2021-05-16 PROCEDURE — 700105 HCHG RX REV CODE 258: Performed by: NURSE PRACTITIONER

## 2021-05-16 PROCEDURE — 700105 HCHG RX REV CODE 258: Performed by: INTERNAL MEDICINE

## 2021-05-16 PROCEDURE — A9270 NON-COVERED ITEM OR SERVICE: HCPCS | Performed by: INTERNAL MEDICINE

## 2021-05-16 PROCEDURE — 700111 HCHG RX REV CODE 636 W/ 250 OVERRIDE (IP)

## 2021-05-16 PROCEDURE — 700102 HCHG RX REV CODE 250 W/ 637 OVERRIDE(OP): Performed by: INTERNAL MEDICINE

## 2021-05-16 PROCEDURE — 80053 COMPREHEN METABOLIC PANEL: CPT

## 2021-05-16 PROCEDURE — 83735 ASSAY OF MAGNESIUM: CPT

## 2021-05-16 PROCEDURE — 92950 HEART/LUNG RESUSCITATION CPR: CPT

## 2021-05-16 PROCEDURE — 700111 HCHG RX REV CODE 636 W/ 250 OVERRIDE (IP): Performed by: INTERNAL MEDICINE

## 2021-05-16 PROCEDURE — 700111 HCHG RX REV CODE 636 W/ 250 OVERRIDE (IP): Performed by: NURSE PRACTITIONER

## 2021-05-16 PROCEDURE — 82330 ASSAY OF CALCIUM: CPT

## 2021-05-16 PROCEDURE — 99223 1ST HOSP IP/OBS HIGH 75: CPT | Performed by: HOSPITALIST

## 2021-05-16 PROCEDURE — 94660 CPAP INITIATION&MGMT: CPT

## 2021-05-16 PROCEDURE — 94799 UNLISTED PULMONARY SVC/PX: CPT

## 2021-05-16 PROCEDURE — 85610 PROTHROMBIN TIME: CPT

## 2021-05-16 PROCEDURE — 84100 ASSAY OF PHOSPHORUS: CPT | Mod: 91

## 2021-05-16 PROCEDURE — 85025 COMPLETE CBC W/AUTO DIFF WBC: CPT

## 2021-05-16 PROCEDURE — 770022 HCHG ROOM/CARE - ICU (200)

## 2021-05-16 PROCEDURE — A9270 NON-COVERED ITEM OR SERVICE: HCPCS | Performed by: HOSPITALIST

## 2021-05-16 PROCEDURE — 99238 HOSP IP/OBS DSCHRG MGMT 30/<: CPT | Performed by: INTERNAL MEDICINE

## 2021-05-16 PROCEDURE — 700101 HCHG RX REV CODE 250: Performed by: HOSPITALIST

## 2021-05-16 PROCEDURE — 700105 HCHG RX REV CODE 258: Performed by: HOSPITALIST

## 2021-05-16 PROCEDURE — 94760 N-INVAS EAR/PLS OXIMETRY 1: CPT

## 2021-05-16 PROCEDURE — 99233 SBSQ HOSP IP/OBS HIGH 50: CPT | Performed by: INTERNAL MEDICINE

## 2021-05-16 RX ORDER — AMIODARONE HYDROCHLORIDE 200 MG/1
400 TABLET ORAL TWICE DAILY
Status: DISCONTINUED | OUTPATIENT
Start: 2021-05-16 | End: 2021-05-16

## 2021-05-16 RX ORDER — CHOLECALCIFEROL (VITAMIN D3) 125 MCG
5 CAPSULE ORAL NIGHTLY
Status: DISCONTINUED | OUTPATIENT
Start: 2021-05-16 | End: 2021-05-20 | Stop reason: HOSPADM

## 2021-05-16 RX ORDER — PROMETHAZINE HYDROCHLORIDE 25 MG/1
12.5-25 SUPPOSITORY RECTAL EVERY 4 HOURS PRN
Status: DISCONTINUED | OUTPATIENT
Start: 2021-05-16 | End: 2021-05-20 | Stop reason: HOSPADM

## 2021-05-16 RX ORDER — CARVEDILOL 6.25 MG/1
6.25 TABLET ORAL 2 TIMES DAILY WITH MEALS
Status: DISCONTINUED | OUTPATIENT
Start: 2021-05-16 | End: 2021-05-16 | Stop reason: HOSPADM

## 2021-05-16 RX ORDER — METOPROLOL TARTRATE 1 MG/ML
5 INJECTION, SOLUTION INTRAVENOUS
Status: DISCONTINUED | OUTPATIENT
Start: 2021-05-16 | End: 2021-05-20 | Stop reason: HOSPADM

## 2021-05-16 RX ORDER — ONDANSETRON 4 MG/1
4 TABLET, ORALLY DISINTEGRATING ORAL EVERY 4 HOURS PRN
Status: DISCONTINUED | OUTPATIENT
Start: 2021-05-16 | End: 2021-05-20 | Stop reason: HOSPADM

## 2021-05-16 RX ORDER — ONDANSETRON 2 MG/ML
4 INJECTION INTRAMUSCULAR; INTRAVENOUS EVERY 4 HOURS PRN
Status: DISCONTINUED | OUTPATIENT
Start: 2021-05-16 | End: 2021-05-20 | Stop reason: HOSPADM

## 2021-05-16 RX ORDER — LORAZEPAM 2 MG/1
2 TABLET ORAL
Status: DISCONTINUED | OUTPATIENT
Start: 2021-05-16 | End: 2021-05-20 | Stop reason: HOSPADM

## 2021-05-16 RX ORDER — PROMETHAZINE HYDROCHLORIDE 25 MG/1
12.5-25 TABLET ORAL EVERY 4 HOURS PRN
Status: DISCONTINUED | OUTPATIENT
Start: 2021-05-16 | End: 2021-05-20 | Stop reason: HOSPADM

## 2021-05-16 RX ORDER — LORAZEPAM 2 MG/ML
1 INJECTION INTRAMUSCULAR
Status: DISCONTINUED | OUTPATIENT
Start: 2021-05-16 | End: 2021-05-20 | Stop reason: HOSPADM

## 2021-05-16 RX ORDER — POLYETHYLENE GLYCOL 3350 17 G/17G
1 POWDER, FOR SOLUTION ORAL
Status: DISCONTINUED | OUTPATIENT
Start: 2021-05-16 | End: 2021-05-20 | Stop reason: HOSPADM

## 2021-05-16 RX ORDER — DIGOXIN 0.25 MG/ML
250 INJECTION INTRAMUSCULAR; INTRAVENOUS DAILY
Status: DISCONTINUED | OUTPATIENT
Start: 2021-05-16 | End: 2021-05-17

## 2021-05-16 RX ORDER — LORAZEPAM 2 MG/ML
0.5 INJECTION INTRAMUSCULAR EVERY 4 HOURS PRN
Status: DISCONTINUED | OUTPATIENT
Start: 2021-05-16 | End: 2021-05-20 | Stop reason: HOSPADM

## 2021-05-16 RX ORDER — DEXTROSE MONOHYDRATE 50 MG/ML
INJECTION, SOLUTION INTRAVENOUS CONTINUOUS
Status: DISCONTINUED | OUTPATIENT
Start: 2021-05-16 | End: 2021-05-18

## 2021-05-16 RX ORDER — SPIRONOLACTONE 25 MG/1
25 TABLET ORAL
Status: DISCONTINUED | OUTPATIENT
Start: 2021-05-17 | End: 2021-05-20 | Stop reason: HOSPADM

## 2021-05-16 RX ORDER — LORAZEPAM 2 MG/ML
1.5 INJECTION INTRAMUSCULAR
Status: DISCONTINUED | OUTPATIENT
Start: 2021-05-16 | End: 2021-05-20 | Stop reason: HOSPADM

## 2021-05-16 RX ORDER — DOXYCYCLINE 100 MG/1
100 TABLET ORAL EVERY 12 HOURS
Status: COMPLETED | OUTPATIENT
Start: 2021-05-16 | End: 2021-05-19

## 2021-05-16 RX ORDER — VALSARTAN 80 MG/1
40 TABLET ORAL TWICE DAILY
Status: DISCONTINUED | OUTPATIENT
Start: 2021-05-16 | End: 2021-05-18

## 2021-05-16 RX ORDER — LORAZEPAM 2 MG/ML
2 INJECTION INTRAMUSCULAR
Status: DISCONTINUED | OUTPATIENT
Start: 2021-05-16 | End: 2021-05-20 | Stop reason: HOSPADM

## 2021-05-16 RX ORDER — AMOXICILLIN 250 MG
2 CAPSULE ORAL 2 TIMES DAILY
Status: DISCONTINUED | OUTPATIENT
Start: 2021-05-16 | End: 2021-05-20 | Stop reason: HOSPADM

## 2021-05-16 RX ORDER — DEXTROSE MONOHYDRATE 50 MG/ML
INJECTION, SOLUTION INTRAVENOUS CONTINUOUS
Status: DISCONTINUED | OUTPATIENT
Start: 2021-05-16 | End: 2021-05-16 | Stop reason: HOSPADM

## 2021-05-16 RX ORDER — LORAZEPAM 0.5 MG/1
0.5 TABLET ORAL EVERY 4 HOURS PRN
Status: DISCONTINUED | OUTPATIENT
Start: 2021-05-16 | End: 2021-05-20 | Stop reason: HOSPADM

## 2021-05-16 RX ORDER — CHLORDIAZEPOXIDE HYDROCHLORIDE 25 MG/1
50 CAPSULE, GELATIN COATED ORAL EVERY 8 HOURS
Status: DISCONTINUED | OUTPATIENT
Start: 2021-05-16 | End: 2021-05-17

## 2021-05-16 RX ORDER — CARVEDILOL 6.25 MG/1
6.25 TABLET ORAL 2 TIMES DAILY WITH MEALS
Status: DISCONTINUED | OUTPATIENT
Start: 2021-05-16 | End: 2021-05-19

## 2021-05-16 RX ORDER — LORAZEPAM 1 MG/1
1 TABLET ORAL EVERY 4 HOURS PRN
Status: DISCONTINUED | OUTPATIENT
Start: 2021-05-16 | End: 2021-05-20 | Stop reason: HOSPADM

## 2021-05-16 RX ORDER — MAGNESIUM SULFATE HEPTAHYDRATE 500 MG/ML
2 INJECTION, SOLUTION INTRAMUSCULAR; INTRAVENOUS ONCE
Status: COMPLETED | OUTPATIENT
Start: 2021-05-16 | End: 2021-05-16

## 2021-05-16 RX ORDER — MAGNESIUM SULFATE HEPTAHYDRATE 40 MG/ML
2 INJECTION, SOLUTION INTRAVENOUS ONCE
Status: COMPLETED | OUTPATIENT
Start: 2021-05-16 | End: 2021-05-16

## 2021-05-16 RX ORDER — LABETALOL HYDROCHLORIDE 5 MG/ML
10 INJECTION, SOLUTION INTRAVENOUS EVERY 4 HOURS PRN
Status: DISCONTINUED | OUTPATIENT
Start: 2021-05-16 | End: 2021-05-20 | Stop reason: HOSPADM

## 2021-05-16 RX ORDER — LORAZEPAM 2 MG/1
4 TABLET ORAL
Status: DISCONTINUED | OUTPATIENT
Start: 2021-05-16 | End: 2021-05-20 | Stop reason: HOSPADM

## 2021-05-16 RX ORDER — CLONIDINE HYDROCHLORIDE 0.1 MG/1
0.1 TABLET ORAL EVERY 6 HOURS PRN
Status: DISCONTINUED | OUTPATIENT
Start: 2021-05-16 | End: 2021-05-20 | Stop reason: HOSPADM

## 2021-05-16 RX ORDER — BISACODYL 10 MG
10 SUPPOSITORY, RECTAL RECTAL
Status: DISCONTINUED | OUTPATIENT
Start: 2021-05-16 | End: 2021-05-20 | Stop reason: HOSPADM

## 2021-05-16 RX ORDER — MAGNESIUM SULFATE HEPTAHYDRATE 40 MG/ML
INJECTION, SOLUTION INTRAVENOUS
Status: COMPLETED
Start: 2021-05-16 | End: 2021-05-16

## 2021-05-16 RX ORDER — PROCHLORPERAZINE EDISYLATE 5 MG/ML
5-10 INJECTION INTRAMUSCULAR; INTRAVENOUS EVERY 4 HOURS PRN
Status: DISCONTINUED | OUTPATIENT
Start: 2021-05-16 | End: 2021-05-20 | Stop reason: HOSPADM

## 2021-05-16 RX ORDER — ENALAPRILAT 1.25 MG/ML
1.25 INJECTION INTRAVENOUS EVERY 6 HOURS PRN
Status: DISCONTINUED | OUTPATIENT
Start: 2021-05-16 | End: 2021-05-20 | Stop reason: HOSPADM

## 2021-05-16 RX ADMIN — CALCIUM GLUCONATE 1 G: 98 INJECTION, SOLUTION INTRAVENOUS at 07:24

## 2021-05-16 RX ADMIN — MAGNESIUM SULFATE 2 G: 2 INJECTION INTRAVENOUS at 11:27

## 2021-05-16 RX ADMIN — ENOXAPARIN SODIUM 120 MG: 120 INJECTION SUBCUTANEOUS at 05:41

## 2021-05-16 RX ADMIN — AMPICILLIN AND SULBACTAM 3 G: 2; 1 INJECTION, POWDER, FOR SOLUTION INTRAVENOUS at 23:06

## 2021-05-16 RX ADMIN — DOXYCYCLINE 100 MG: 100 TABLET, FILM COATED ORAL at 17:09

## 2021-05-16 RX ADMIN — CHLORDIAZEPOXIDE HYDROCHLORIDE 50 MG: 25 CAPSULE ORAL at 23:06

## 2021-05-16 RX ADMIN — AMPICILLIN SODIUM AND SULBACTAM SODIUM 3 G: 2; 1 INJECTION, POWDER, FOR SOLUTION INTRAMUSCULAR; INTRAVENOUS at 00:51

## 2021-05-16 RX ADMIN — AMPICILLIN SODIUM AND SULBACTAM SODIUM 3 G: 2; 1 INJECTION, POWDER, FOR SOLUTION INTRAMUSCULAR; INTRAVENOUS at 12:21

## 2021-05-16 RX ADMIN — AMIODARONE HYDROCHLORIDE 1 MG/MIN: 1.8 INJECTION, SOLUTION INTRAVENOUS at 17:14

## 2021-05-16 RX ADMIN — SPIRONOLACTONE 25 MG: 25 TABLET, FILM COATED ORAL at 05:40

## 2021-05-16 RX ADMIN — Medication 5 MG: at 23:06

## 2021-05-16 RX ADMIN — DIGOXIN 250 MCG: 0.25 INJECTION INTRAMUSCULAR; INTRAVENOUS at 17:10

## 2021-05-16 RX ADMIN — ENOXAPARIN SODIUM 120 MG: 120 INJECTION SUBCUTANEOUS at 17:10

## 2021-05-16 RX ADMIN — CARVEDILOL 6.25 MG: 6.25 TABLET, FILM COATED ORAL at 17:09

## 2021-05-16 RX ADMIN — FUROSEMIDE 5 MG/HR: 10 INJECTION, SOLUTION INTRAMUSCULAR; INTRAVENOUS at 03:56

## 2021-05-16 RX ADMIN — AMIODARONE HYDROCHLORIDE 300 MG: 1.5 INJECTION, SOLUTION INTRAVENOUS at 12:20

## 2021-05-16 RX ADMIN — POTASSIUM CHLORIDE 40 MEQ: 1500 TABLET, EXTENDED RELEASE ORAL at 05:40

## 2021-05-16 RX ADMIN — CHLORDIAZEPOXIDE HYDROCHLORIDE 50 MG: 25 CAPSULE ORAL at 05:40

## 2021-05-16 RX ADMIN — AMPICILLIN AND SULBACTAM 3 G: 2; 1 INJECTION, POWDER, FOR SOLUTION INTRAVENOUS at 17:09

## 2021-05-16 RX ADMIN — DOXYCYCLINE 100 MG: 100 TABLET, FILM COATED ORAL at 05:40

## 2021-05-16 RX ADMIN — VALSARTAN 40 MG: 80 TABLET, FILM COATED ORAL at 17:11

## 2021-05-16 RX ADMIN — AMIODARONE HYDROCHLORIDE 1 MG/MIN: 1.8 INJECTION, SOLUTION INTRAVENOUS at 13:16

## 2021-05-16 RX ADMIN — DEXTROSE MONOHYDRATE: 50 INJECTION, SOLUTION INTRAVENOUS at 15:36

## 2021-05-16 RX ADMIN — MAGNESIUM SULFATE 2 G: 2 INJECTION INTRAVENOUS at 07:24

## 2021-05-16 RX ADMIN — DEXTROSE MONOHYDRATE: 50 INJECTION, SOLUTION INTRAVENOUS at 22:13

## 2021-05-16 RX ADMIN — DEXTROSE MONOHYDRATE: 50 INJECTION, SOLUTION INTRAVENOUS at 12:20

## 2021-05-16 RX ADMIN — AMPICILLIN SODIUM AND SULBACTAM SODIUM 3 G: 2; 1 INJECTION, POWDER, FOR SOLUTION INTRAMUSCULAR; INTRAVENOUS at 05:40

## 2021-05-16 RX ADMIN — VALSARTAN 40 MG: 80 TABLET, FILM COATED ORAL at 05:40

## 2021-05-16 RX ADMIN — CHLORDIAZEPOXIDE HYDROCHLORIDE 50 MG: 25 CAPSULE ORAL at 15:36

## 2021-05-16 RX ADMIN — CARVEDILOL 3.12 MG: 3.12 TABLET, FILM COATED ORAL at 07:24

## 2021-05-16 RX ADMIN — METOPROLOL TARTRATE 5 MG: 1 INJECTION, SOLUTION INTRAVENOUS at 08:35

## 2021-05-16 ASSESSMENT — LIFESTYLE VARIABLES
TOTAL SCORE: VERY MILD ITCHING, PINS AND NEEDLES SENSATION, BURNING OR NUMBNESS
VISUAL DISTURBANCES: NOT PRESENT
ANXIETY: MILDLY ANXIOUS
AGITATION: SOMEWHAT MORE THAN NORMAL ACTIVITY
ANXIETY: MILDLY ANXIOUS
TOTAL SCORE: VERY MILD ITCHING, PINS AND NEEDLES SENSATION, BURNING OR NUMBNESS
AVERAGE NUMBER OF DAYS PER WEEK YOU HAVE A DRINK CONTAINING ALCOHOL: 7
ANXIETY: MILDLY ANXIOUS
AGITATION: NORMAL ACTIVITY
TOTAL SCORE: 3
ORIENTATION AND CLOUDING OF SENSORIUM: ORIENTED AND CAN DO SERIAL ADDITIONS
VISUAL DISTURBANCES: NOT PRESENT
EVER HAD A DRINK FIRST THING IN THE MORNING TO STEADY YOUR NERVES TO GET RID OF A HANGOVER: NO
TOTAL SCORE: 8
NAUSEA AND VOMITING: NO NAUSEA AND NO VOMITING
VISUAL DISTURBANCES: NOT PRESENT
ALCOHOL_USE: YES
ORIENTATION AND CLOUDING OF SENSORIUM: ORIENTED AND CAN DO SERIAL ADDITIONS
TOTAL SCORE: 3
ORIENTATION AND CLOUDING OF SENSORIUM: ORIENTED AND CAN DO SERIAL ADDITIONS
TREMOR: *
HAVE YOU EVER FELT YOU SHOULD CUT DOWN ON YOUR DRINKING: NO
AUDITORY DISTURBANCES: NOT PRESENT
TREMOR: TREMOR NOT VISIBLE BUT CAN BE FELT, FINGERTIP TO FINGERTIP
HEADACHE, FULLNESS IN HEAD: NOT PRESENT
DOES PATIENT WANT TO STOP DRINKING: NO
CONSUMPTION TOTAL: POSITIVE
HOW MANY TIMES IN THE PAST YEAR HAVE YOU HAD 5 OR MORE DRINKS IN A DAY: 2
PAROXYSMAL SWEATS: NO SWEAT VISIBLE
ANXIETY: MILDLY ANXIOUS
HEADACHE, FULLNESS IN HEAD: VERY MILD
PAROXYSMAL SWEATS: BARELY PERCEPTIBLE SWEATING. PALMS MOIST
ANXIETY: MILDLY ANXIOUS
TOTAL SCORE: 4
TREMOR: TREMOR NOT VISIBLE BUT CAN BE FELT, FINGERTIP TO FINGERTIP
ORIENTATION AND CLOUDING OF SENSORIUM: ORIENTED AND CAN DO SERIAL ADDITIONS
TOTAL SCORE: 0
AUDITORY DISTURBANCES: NOT PRESENT
AUDITORY DISTURBANCES: NOT PRESENT
AGITATION: NORMAL ACTIVITY
ORIENTATION AND CLOUDING OF SENSORIUM: ORIENTED AND CAN DO SERIAL ADDITIONS
EVER FELT BAD OR GUILTY ABOUT YOUR DRINKING: NO
AUDITORY DISTURBANCES: NOT PRESENT
AGITATION: NORMAL ACTIVITY
TREMOR: NO TREMOR
HEADACHE, FULLNESS IN HEAD: NOT PRESENT
HAVE PEOPLE ANNOYED YOU BY CRITICIZING YOUR DRINKING: NO
ANXIETY: MILDLY ANXIOUS
TOTAL SCORE: 0
NAUSEA AND VOMITING: NO NAUSEA AND NO VOMITING
NAUSEA AND VOMITING: NO NAUSEA AND NO VOMITING
ORIENTATION AND CLOUDING OF SENSORIUM: ORIENTED AND CAN DO SERIAL ADDITIONS
NAUSEA AND VOMITING: NO NAUSEA AND NO VOMITING
VISUAL DISTURBANCES: NOT PRESENT
NAUSEA AND VOMITING: NO NAUSEA AND NO VOMITING
TOTAL SCORE: 0
VISUAL DISTURBANCES: VERY MILD SENSITIVITY
TOTAL SCORE: 4
AUDITORY DISTURBANCES: VERY MILD HARSHNESS OR ABILITY TO FRIGHTEN
PAROXYSMAL SWEATS: NO SWEAT VISIBLE
TREMOR: TREMOR NOT VISIBLE BUT CAN BE FELT, FINGERTIP TO FINGERTIP
AUDITORY DISTURBANCES: NOT PRESENT
ON A TYPICAL DAY WHEN YOU DRINK ALCOHOL HOW MANY DRINKS DO YOU HAVE: 2
HEADACHE, FULLNESS IN HEAD: NOT PRESENT
HEADACHE, FULLNESS IN HEAD: NOT PRESENT
TREMOR: TREMOR NOT VISIBLE BUT CAN BE FELT, FINGERTIP TO FINGERTIP
NAUSEA AND VOMITING: NO NAUSEA AND NO VOMITING
AGITATION: NORMAL ACTIVITY
PAROXYSMAL SWEATS: BARELY PERCEPTIBLE SWEATING. PALMS MOIST
AGITATION: NORMAL ACTIVITY
PAROXYSMAL SWEATS: BARELY PERCEPTIBLE SWEATING. PALMS MOIST
TOTAL SCORE: 1
PAROXYSMAL SWEATS: BARELY PERCEPTIBLE SWEATING. PALMS MOIST
VISUAL DISTURBANCES: NOT PRESENT
HEADACHE, FULLNESS IN HEAD: NOT PRESENT

## 2021-05-16 ASSESSMENT — PAIN DESCRIPTION - PAIN TYPE
TYPE: ACUTE PAIN

## 2021-05-16 ASSESSMENT — COGNITIVE AND FUNCTIONAL STATUS - GENERAL
DAILY ACTIVITIY SCORE: 24
MOBILITY SCORE: 24
SUGGESTED CMS G CODE MODIFIER MOBILITY: CH
SUGGESTED CMS G CODE MODIFIER DAILY ACTIVITY: CH

## 2021-05-16 ASSESSMENT — PATIENT HEALTH QUESTIONNAIRE - PHQ9
1. LITTLE INTEREST OR PLEASURE IN DOING THINGS: NOT AT ALL
SUM OF ALL RESPONSES TO PHQ9 QUESTIONS 1 AND 2: 0
2. FEELING DOWN, DEPRESSED, IRRITABLE, OR HOPELESS: NOT AT ALL
2. FEELING DOWN, DEPRESSED, IRRITABLE, OR HOPELESS: NOT AT ALL
SUM OF ALL RESPONSES TO PHQ9 QUESTIONS 1 AND 2: 0
1. LITTLE INTEREST OR PLEASURE IN DOING THINGS: NOT AT ALL

## 2021-05-16 ASSESSMENT — ENCOUNTER SYMPTOMS
SHORTNESS OF BREATH: 0
GASTROINTESTINAL NEGATIVE: 1
SPUTUM PRODUCTION: 0
DIARRHEA: 0
FEVER: 0
VOMITING: 0
MUSCULOSKELETAL NEGATIVE: 1
HEADACHES: 0
NAUSEA: 0
CARDIOVASCULAR NEGATIVE: 1
COUGH: 1
CONSTITUTIONAL NEGATIVE: 1
CONSTIPATION: 0
CHILLS: 0
EYES NEGATIVE: 1
WHEEZING: 0

## 2021-05-16 NOTE — PROGRESS NOTES
Wife called RN to bedside. Pt non-responsive with pulse. Pt in Vfib. Zoll pads placed. Shock x 2 performed. Pt after second shock in ST. EKG completed. Labs drawn. RT managing airway. Pt on NRB and awake at this time. Pt states that he remembers his wife calling for help but nothing else. Pt denies any changes before the Vfib arrest. Pt at this time A/O x4. Pt made NPO pt to transfer to [facility] for possible AICD placement.

## 2021-05-16 NOTE — CODE DOCUMENTATION
After defib patient rhythm changed to Sinus bradycardia with palpable radial and femoral pulses, then patient converted back to afib rate 110's Patient awake and asking questions, RT placed pt on a non-rebreather mask, sats 98%.

## 2021-05-16 NOTE — ASSESSMENT & PLAN NOTE
With concurrent pulmonary edema, EF 35%   Cardiology consulting  Heart failure protocol  [facility] clean  Cont IV lasix cautiously given pressures

## 2021-05-16 NOTE — PROGRESS NOTES
Assumed pt care. AOx4. Denies any pain at this time.  Denies any other distress.  Discussed POC.  Pt verbalized understanding.  Hourly rounding in place. Fall precautions in place and call lights w/in reach. All lines and gtt verified Lasix drip infusing at different amount than order will verify with MD. Pt remains in Afib. Pt remains on 6L NC and denies any SOB or CP. Code cart remains at bedside. Pt continues to have no complaints.

## 2021-05-16 NOTE — FLOWSHEET NOTE
05/15/21 1815   Events/Summary/Plan   Events/Summary/Plan BiPAP not in use at this time. On Nasal Cannula   Vital Signs   Pulse (!) 58   Respiration (!) 21   Pulse Oximetry 93 %

## 2021-05-16 NOTE — PROGRESS NOTES
Pt transported via [facility] to [place] [address] for cardiology.  Report called to Rogelio ELIZALDE. Wife at bedside. Wife to take all belongings including cell phone. Pt remains A/O x 4 on 4L NC. Denies any SOB, or chest discomfort. Pt remains in ST at this time. Heat pack on RUE r/t infiltrated PIV x 2. Pt had BM. Care released to [facility] RN with amio gtt infusing at ordered rate.

## 2021-05-16 NOTE — FLOWSHEET NOTE
BiPAP not in use at this time.  Stable - no sob or distress noted or observed.  Will continue to monitor.

## 2021-05-16 NOTE — ASSESSMENT & PLAN NOTE
CIWA protocol  Thiamine, folate, MVI  D/w pt and wife he will probably die in the next few years if he cont's to drink as he does.  Wife has been trying to help him quit.  Pt appears motivated at this point.    [facility] has met with pt and family to go over outpt resources to assist with his sobriety

## 2021-05-16 NOTE — PROGRESS NOTES
Patient non responsive, no pulse, Code Blue initiated. See code blue documentation.   Patient's wife Estefany updated of patient's condition.

## 2021-05-16 NOTE — RESPIRATORY CARE
S/P VF arrest.  Hand ventilated with oral airway after oral suctioning.  Patient shocked X 2, with return of sinus tach. Continued hand ventilation until patient oriented to time and place.  Placed on NRB at 15 lpm.  Patient now awake and alert.  [facility] Saturation 100%.

## 2021-05-16 NOTE — FLOWSHEET NOTE
05/16/21 0200   Events/Summary/Plan   Events/Summary/Plan Nasal Cannula in Use   Vital Signs   Pulse 61   Respiration 17   Pulse Oximetry 94 %   Oxygen   O2 (LPM) 6   O2 Delivery Device Silicone Nasal Cannula

## 2021-05-16 NOTE — FLOWSHEET NOTE
05/15/21 2200   Events/Summary/Plan   Events/Summary/Plan 100% NRBM   Vital Signs   Pulse (!) 42   Respiration (!) 28   Pulse Oximetry 96 %

## 2021-05-16 NOTE — ASSESSMENT & PLAN NOTE
Occurred on 5/16/2021 in ICU at [facility]  Seen by cardiology at [facility] and cardioverted  Cont po amio load  Also on carvedilol 6.25: plan DC on metoprolol SR  Given severe dilated cardiomyopathy ICD placed 5/18  Cont to follow Tele, e-

## 2021-05-16 NOTE — CARE PLAN
The patient is Watcher - Medium risk of patient condition declining or worsening         Progress made toward(s) clinical / shift goals:    Problem: Knowledge Deficit - Standard  Goal: Patient and family/care givers will demonstrate understanding of plan of care, disease process/condition, diagnostic tests and medications  Outcome: Progressing     Problem: Optimal Care for Alcohol Withdrawal  Goal: Optimal Care for the alcohol withdrawal patient  Outcome: Progressing     Problem: Lifestyle Changes  Goal: Patient's ability to identify lifestyle changes and available resources to help reduce recurrence of condition will improve  Outcome: Progressing     Problem: Skin Integrity  Goal: Skin integrity is maintained or improved  Outcome: Progressing     Problem: Care Map:  Day 2 Optimal Outcome for the Heart Failure Patient  Goal: Day 2:  Optimal Care of the heart failure patient  Outcome: Progressing     Problem: Pain - Standard  Goal: Alleviation of pain or a reduction in pain to the patient’s comfort goal  Outcome: Progressing       Patient is not progressing towards the following goals:

## 2021-05-16 NOTE — FLOWSHEET NOTE
05/16/21 0628   Events/Summary/Plan   Events/Summary/Plan BiPAP on stand by   Vital Signs   Pulse (!) 106   Respiration (!) 27   Pulse Oximetry 97 %   $ Pulse Oximetry (Spot Check) Yes   Respiratory Assessment   Level of Consciousness Alert   Oxygen   O2 (LPM) 6   O2 Delivery Device Silicone Nasal Cannula

## 2021-05-16 NOTE — PROGRESS NOTES
Hospital Medicine Daily Progress Note    Date of Service  5/15/2021    Chief Complaint  50 y.o. male admitted 5/14/2021 with heart failure exacerbation    Hospital Course  50 years old male with a past medical history of alcoholism and varicose veins admitted.  5/14 with shortness of breath he was also found to have atrial fibrillation with rapid ventricular vision to acute systolic heart failure and has been seen by cardiology and started on injection of the and Cardizem    Interval Problem Update  CODE BLUE was called around 9:10.  On bedside evaluation the patient was pulseless, high-quality chest compressions were started patient was given epinephrine.  A rhythm check revealed ventricular fibrillation, accordingly cardioversion was performed, the patient gradually became responsive and a pulse was detected.  Over them showed sinus bradycardia which then changed.  Ventricular fibrillation with a rate of 120-130.  Blood pressures were in the 110 systolic.  I will hold his diltiazem drip  I will check a digoxin level, potassium and magnesium.  Aim for a potassium above 4 and magnesium above 2    Patient is critically ill.   The patient continues to have: Pulselessness  The vital organ system that is affected is the: Heart  If untreated there is a high chance of deterioration into: death   And eventually death.   The critical care that I am providing today is: cardioversion    The critical that has been undertaken is medically complex.   There has been no overlap in critical care time.   Critical Care Time not including procedures: 45 minutes      Consultants/Specialty  Intensive care, cardiology    Code Status  FULL     Disposition  ICU     Review of Systems  Review of Systems   Unable to perform ROS: Acuity of condition      Physical Exam  Temp:  [36.7 °C (98 °F)-37.7 °C (99.9 °F)] 37.2 °C (98.9 °F)  Pulse:  [] 86  Resp:  [14-67] 24  BP: (109-162)/() 112/70  SpO2:  [88 %-99 %] 95 %    Physical  Exam  Vitals and nursing note reviewed.   Constitutional:       Appearance: He is well-developed. He is not diaphoretic.   HENT:      Head: Normocephalic and atraumatic.      Right Ear: External ear normal.      Left Ear: External ear normal.      Nose: No congestion or rhinorrhea.      Mouth/Throat:      Mouth: Mucous membranes are moist.      Pharynx: No oropharyngeal exudate or posterior oropharyngeal erythema.   Eyes:      General:         Right eye: No discharge.         Left eye: No discharge.      Conjunctiva/sclera: Conjunctivae normal.   Neck:      Vascular: No JVD.   Cardiovascular:      Heart sounds: No friction rub. No gallop.       Comments: Raised JVP  Pulmonary:      Breath sounds: No stridor. Rhonchi present. No wheezing.      Comments: Reduced air entry and fine crackles bilaterally basally.   Abdominal:      Palpations: Abdomen is soft.      Tenderness: There is no abdominal tenderness. There is no guarding or rebound.   Musculoskeletal:         General: Swelling present. No tenderness.      Cervical back: No rigidity. No muscular tenderness.      Right lower leg: Edema present.      Left lower leg: Edema present.   Skin:     General: Skin is warm and dry.      Coloration: Skin is pale.      Findings: No rash.   Neurological:      Comments: Nonresponsive   Psychiatric:      Comments: Unable to assess reliably       Fluids    Intake/Output Summary (Last 24 hours) at 5/15/2021 2202  Last data filed at 5/15/2021 2000  Gross per 24 hour   Intake 1371.84 ml   Output 8010 ml   Net -6638.16 ml       Laboratory  Recent Labs     05/14/21  1125 05/15/21  0600 05/15/21  2121   WBC 8.3 10.0 9.4   RBC 4.73 4.68* 5.36   HEMOGLOBIN 16.2 15.9 18.4*   HEMATOCRIT 47.9 47.6 55.0*   .3* 101.7* 102.6*   MCH 34.2* 34.0* 34.3*   MCHC 33.8 33.4* 33.5*   RDW 53.0* 52.3* 51.6*   PLATELETCT 168 151* 144*   MPV 9.7 10.1 9.9     Recent Labs     05/14/21  1125 05/15/21  0600 05/15/21  2121   SODIUM 140 138 140    POTASSIUM 3.7 3.7 3.9   CHLORIDE 103 96 96   CO2 23 29 28   GLUCOSE 132* 117* 131*   BUN 10 7* 9   CREATININE 0.88 0.76 0.87   CALCIUM 8.5 8.4 8.7                   Imaging  DX-CHEST-LIMITED (1 VIEW)   Final Result         1.  Pulmonary edema and/or infiltrates are identified, which appear somewhat increased since the prior exam.   2.  Cardiomegaly      EC-ECHOCARDIOGRAM COMPLETE W/ CONT   Final Result      CT-CTA CHEST PULMONARY ARTERY W/ RECONS   Final Result      Right upper greater than lower lobe consolidation and groundglass is more likely from pneumonia than edema. Bacterial pneumonia is favored over Covid given the lack of left lung involvement. This is further supported by isolated right hilar    lymphadenopathy which most likely is reactive. Consider follow-up to resolution      Moderate multichamber cardiac enlargement and there is some interlobular septal thickening which is compatible with interstitial edema      Small right pleural effusion      Remote granulomatous disease      No CT evidence of pulmonary thromboembolism to the proximal segmental levels      Pectus excavatum deformity      DX-CHEST-PORTABLE (1 VIEW)   Final Result      1.  Right upper lobe airspace process suspicious for pneumonia      2.  Enlarged cardiac silhouette         Assessment/Plan  Ventricular fibrillation (HCC)- (present on admission)  Assessment & Plan  CODE BLUE was called around 9:10.  On bedside evaluation the patient was pulseless, high-quality chest compressions were started patient was given epinephrine.  A rhythm check revealed ventricular fibrillation, accordingly cardioversion was performed, the patient gradually became responsive and a pulse was detected.  Over them showed sinus bradycardia which then changed.  Ventricular fibrillation with a rate of 120-130.  Blood pressures were in the 110 systolic.  I will hold his diltiazem drip  I will check a digoxin level, potassium and magnesium.   Aim for a potassium  above 4 and magnesium above 2     Prolonged Q-T interval on ECG- (present on admission)  Assessment & Plan  EKG shows atrial fibrillation with a prolonged .  Try to avoid QT prolonging medications as much as possible.  I will switch azithromycin with doxycycline     VTE prophylaxis: Lovenox

## 2021-05-16 NOTE — PROGRESS NOTES
Critical care progress note:  Patient transferred per cardiology from [facility] for further cardiac work up and EP cardiology evaluation.  V fib shocked today, now stable on amiodarone drip.  Has been treated for atrial fibrillation on this admission.  Does not currently require critical care management.  Please call Intensivist on call if requires critical care evaluation/management.  Contacted triage officer to assign hospitalist.  Nursing to call cardiology.  Patient was seen by Dr Hurtado Pulmonary Critical Care MD at [facility].

## 2021-05-16 NOTE — ASSESSMENT & PLAN NOTE
- X2 episodes of V Fib in the setting of acute systolic heart failure and dilated cardiomyopathy.   - Started on Amiodarone. Will continue. Optimizing k/mag.   - Considering AICD placement, transfer to [facility] and evaluation by EP.

## 2021-05-16 NOTE — DISCHARGE PLANNING
Anticipated Discharge Disposition:   Transfer to Regional     Action:     1120: RN CM received a call from Transfer center stating that patient to be transferred to Regional. PCS form to be filled out and faxed to [phone] and COBRA form to be started.     1140: PCS form faxed to [phone]. RN CM alerted by Phyllis Méndez the COBRA form has been started at that bedside RN will fill in the rest.    RN CM given the following information:     Bed: T621  Number for report: [phone]   REMSA time: pending     Bedside RN made aware.     Barriers to Discharge:   Medical Clearance  Transfer to regional     Plan:   Hospital care management will continue to assist with discharge planning needs.

## 2021-05-16 NOTE — ASSESSMENT & PLAN NOTE
CODE BLUE was called around 9:10.  On bedside evaluation the patient was pulseless, high-quality chest compressions were started patient was given epinephrine.  A rhythm check revealed ventricular fibrillation, accordingly cardioversion was performed, the patient gradually became responsive and a pulse was detected.  Over them showed sinus bradycardia which then changed.  Ventricular fibrillation with a rate of 120-130.  Blood pressures were in the 110 systolic.  I will hold his diltiazem drip  I will check a digoxin level, potassium and magnesium.   Aim for a potassium above 4 and magnesium above 2

## 2021-05-16 NOTE — PROGRESS NOTES
Patient received from [facility]. [facility]. Telemetry in place. Amioderone GTT infusing @ 1mg/min. Two RN skin-check completed with Dayami ELIZALDE at bedside.

## 2021-05-16 NOTE — H&P
Hospital Medicine History & Physical Note    Date of Service  5/16/2021    Primary Care Physician  Ean Macario M.D.    Consultants  Cardiologist Dr. New    Code Status  Full code per patient    Chief Complaint  No chief complaint on file.  Transferred for V. fib    History of Presenting Illness  50 y.o. male who presented 5/16/2021 as a transfer from [facility] for V. fib.  Patient was initially admitted on 5/14/2021 to [facility] for A. fib RVR as well as systolic heart failure and alcohol withdrawal.  At that time, patient was being treated in the ICU with CIWA protocol.  Patient was given Cardizem for rate control as well.  However on 5/16/2021, patient converted into V. fib.  He became unresponsive and required hand ventilation.  Initially had pulses but then became pulseless.  He was cardioverted and experienced ROSC and resumed spontaneous breathing.  And then placed on amiodarone drip.  He was thus transferred over to [facility] for further cardiology and EP consult.  Patient himself is feeling fine otherwise.    Review of Systems  Review of Systems   Constitutional: Negative for chills and fever.   Respiratory: Positive for cough ( For few months). Negative for sputum production, shortness of breath and wheezing.    Cardiovascular: Negative for chest pain.   Gastrointestinal: Negative for constipation, diarrhea, nausea and vomiting.   Genitourinary: Negative for dysuria.   Neurological: Negative for headaches.     all other systems reviewed and are negative    Past Medical History   has no past medical history on file.  Varicose veins  Broken bones  Surgical History   has no past surgical history on file.  Vein stripping  Broken nose repair  Tangent teeth removal  Family History  family history is not on file.  Mother had back broken, fibromyalgia, Graves' disease, prolapsed bladder  Father had glaucoma  Social History   reports that he has quit smoking. His smoking use  included cigarettes. He has never used smokeless tobacco. He reports current alcohol use. He reports that he does not use drugs.    Allergies  No Known Allergies    Medications  No current facility-administered medications on file prior to encounter.     Current Outpatient Medications on File Prior to Encounter   Medication Sig Dispense Refill   • fluticasone (FLONASE) 50 MCG/ACT nasal spray Administer 1 Spray into affected nostril(S) 2 times a day as needed (allergy).     • cephALEXin (KEFLEX) 500 MG Cap Take 500 mg by mouth 3 times a day. 10 day course     • NON SPECIFIED Take 2 Tablets by mouth 2 times a day as needed (swelling). OTC Diuretic     • Ascorbic Acid (VITAMIN C GUMMIE PO) Take 3-4 Tablets by mouth every day.     • multivitamin-iron-minerals-folic acid (CENTRUM) chewable tablet Chew 2 Tablets every day.     • predniSONE (DELTASONE) 20 MG Tab Take 3 tabs at once PO daily x 5 days, then take 2 tabs at once daily x 3 days, then take 1 tab PO daily x 2 days (Patient not taking: Reported on 5/14/2021) 23 Tab 0       Physical Exam  Weight/BMI: There is no height or weight on file to calculate BMI.  There were no vitals taken for this visit. There were no vitals filed for this visit. Oxygen Therapy:     Physical Exam  Constitutional:       General: He is not in acute distress.     Appearance: He is well-developed. He is obese. He is diaphoretic. He is not ill-appearing.   HENT:      Head: Normocephalic and atraumatic.      Right Ear: External ear normal.      Left Ear: External ear normal.      Mouth/Throat:      Pharynx: No oropharyngeal exudate or posterior oropharyngeal erythema.   Eyes:      General:         Right eye: No discharge.         Left eye: No discharge.      Extraocular Movements: Extraocular movements intact.   Cardiovascular:      Rate and Rhythm: Tachycardia present.      Heart sounds: No gallop.    Pulmonary:      Effort: No respiratory distress.      Breath sounds: Rales present. No  wheezing.   Abdominal:      General: There is no distension.      Tenderness: There is no abdominal tenderness. There is no guarding.   Musculoskeletal:      Right lower leg: Edema present.      Left lower leg: Edema present.   Skin:     General: Skin is warm.   Neurological:      Mental Status: He is alert and oriented to person, place, and time. Mental status is at baseline.      Motor: No weakness.   Psychiatric:         Mood and Affect: Mood normal.         Behavior: Behavior normal.         Laboratory:   Objective   Recent Results (from the past 24 hour(s))   Comp Metabolic Panel    Collection Time: 05/15/21  9:21 PM   Result Value Ref Range    Sodium 140 135 - 145 mmol/L    Potassium 3.9 3.6 - 5.5 mmol/L    Chloride 96 96 - 112 mmol/L    Co2 28 20 - 33 mmol/L    Anion Gap 16.0 7.0 - 16.0    Glucose 131 (H) 65 - 99 mg/dL    Bun 9 8 - 22 mg/dL    Creatinine 0.87 0.50 - 1.40 mg/dL    Calcium 8.7 8.4 - 10.2 mg/dL    AST(SGOT) 57 (H) 12 - 45 U/L    ALT(SGPT) 57 (H) 2 - 50 U/L    Alkaline Phosphatase 105 (H) 30 - 99 U/L    Total Bilirubin 1.2 0.1 - 1.5 mg/dL    Albumin 3.5 3.2 - 4.9 g/dL    Total Protein 6.2 6.0 - 8.2 g/dL    Globulin 2.7 1.9 - 3.5 g/dL    A-G Ratio 1.3 g/dL   CBC WITH DIFFERENTIAL    Collection Time: 05/15/21  9:21 PM   Result Value Ref Range    WBC 9.4 4.8 - 10.8 K/uL    RBC 5.36 4.70 - 6.10 M/uL    Hemoglobin 18.4 (H) 14.0 - 18.0 g/dL    Hematocrit 55.0 (H) 42.0 - 52.0 %    .6 (H) 81.4 - 97.8 fL    MCH 34.3 (H) 27.0 - 33.0 pg    MCHC 33.5 (L) 33.7 - 35.3 g/dL    RDW 51.6 (H) 35.9 - 50.0 fL    Platelet Count 144 (L) 164 - 446 K/uL    MPV 9.9 9.0 - 12.9 fL    Neutrophils-Polys 65.50 44.00 - 72.00 %    Lymphocytes 17.40 (L) 22.00 - 41.00 %    Monocytes 11.60 0.00 - 13.40 %    Eosinophils 3.90 0.00 - 6.90 %    Basophils 1.10 0.00 - 1.80 %    Immature Granulocytes 0.50 0.00 - 0.90 %    Nucleated RBC 0.00 /100 WBC    Neutrophils (Absolute) 6.13 1.82 - 7.42 K/uL    Lymphs (Absolute) 1.63 1.00 -  4.80 K/uL    Monos (Absolute) 1.09 (H) 0.00 - 0.85 K/uL    Eos (Absolute) 0.37 0.00 - 0.51 K/uL    Baso (Absolute) 0.10 0.00 - 0.12 K/uL    Immature Granulocytes (abs) 0.05 0.00 - 0.11 K/uL    NRBC (Absolute) 0.00 K/uL   MAGNESIUM    Collection Time: 05/15/21  9:21 PM   Result Value Ref Range    Magnesium 1.7 1.5 - 2.5 mg/dL   LACTIC ACID    Collection Time: 05/15/21  9:21 PM   Result Value Ref Range    Lactic Acid 4.2 (HH) 0.5 - 2.0 mmol/L   DIGOXIN    Collection Time: 05/15/21  9:21 PM   Result Value Ref Range    Digoxin 1.1 0.8 - 2.0 ng/mL   ESTIMATED GFR    Collection Time: 05/15/21  9:21 PM   Result Value Ref Range    GFR If African American >60 >60 mL/min/1.73 m 2    GFR If Non African American >60 >60 mL/min/1.73 m 2   PHOSPHORUS    Collection Time: 05/15/21  9:21 PM   Result Value Ref Range    Phosphorus 4.0 2.5 - 4.5 mg/dL   LACTIC ACID    Collection Time: 05/16/21 12:01 AM   Result Value Ref Range    Lactic Acid 1.9 0.5 - 2.0 mmol/L   Magnesium    Collection Time: 05/16/21  4:05 AM   Result Value Ref Range    Magnesium 1.9 1.5 - 2.5 mg/dL   Phosphorus    Collection Time: 05/16/21  4:05 AM   Result Value Ref Range    Phosphorus 4.1 2.5 - 4.5 mg/dL   CBC WITH DIFFERENTIAL    Collection Time: 05/16/21  4:05 AM   Result Value Ref Range    WBC 8.8 4.8 - 10.8 K/uL    RBC 4.56 (L) 4.70 - 6.10 M/uL    Hemoglobin 15.7 14.0 - 18.0 g/dL    Hematocrit 46.3 42.0 - 52.0 %    .5 (H) 81.4 - 97.8 fL    MCH 34.4 (H) 27.0 - 33.0 pg    MCHC 33.9 33.7 - 35.3 g/dL    RDW 50.9 (H) 35.9 - 50.0 fL    Platelet Count 163 (L) 164 - 446 K/uL    MPV 10.2 9.0 - 12.9 fL    Neutrophils-Polys 73.70 (H) 44.00 - 72.00 %    Lymphocytes 8.90 (L) 22.00 - 41.00 %    Monocytes 12.30 0.00 - 13.40 %    Eosinophils 3.60 0.00 - 6.90 %    Basophils 0.80 0.00 - 1.80 %    Immature Granulocytes 0.70 0.00 - 0.90 %    Nucleated RBC 0.00 /100 WBC    Neutrophils (Absolute) 6.49 1.82 - 7.42 K/uL    Lymphs (Absolute) 0.78 (L) 1.00 - 4.80 K/uL    Monos  (Absolute) 1.08 (H) 0.00 - 0.85 K/uL    Eos (Absolute) 0.32 0.00 - 0.51 K/uL    Baso (Absolute) 0.07 0.00 - 0.12 K/uL    Immature Granulocytes (abs) 0.06 0.00 - 0.11 K/uL    NRBC (Absolute) 0.00 K/uL   Comp Metabolic Panel    Collection Time: 05/16/21  4:05 AM   Result Value Ref Range    Sodium 139 135 - 145 mmol/L    Potassium 4.1 3.6 - 5.5 mmol/L    Chloride 96 96 - 112 mmol/L    Co2 31 20 - 33 mmol/L    Anion Gap 12.0 7.0 - 16.0    Glucose 97 65 - 99 mg/dL    Bun 10 8 - 22 mg/dL    Creatinine 0.78 0.50 - 1.40 mg/dL    Calcium 8.2 (L) 8.4 - 10.2 mg/dL    AST(SGOT) 59 (H) 12 - 45 U/L    ALT(SGPT) 52 (H) 2 - 50 U/L    Alkaline Phosphatase 97 30 - 99 U/L    Total Bilirubin 1.2 0.1 - 1.5 mg/dL    Albumin 3.2 3.2 - 4.9 g/dL    Total Protein 5.6 (L) 6.0 - 8.2 g/dL    Globulin 2.4 1.9 - 3.5 g/dL    A-G Ratio 1.3 g/dL   ESTIMATED GFR    Collection Time: 05/16/21  4:05 AM   Result Value Ref Range    GFR If African American >60 >60 mL/min/1.73 m 2    GFR If Non African American >60 >60 mL/min/1.73 m 2   IONIZED CALCIUM    Collection Time: 05/16/21  4:35 AM   Result Value Ref Range    Ionized Calcium 1.03 (L) 1.10 - 1.30 mmol/L   CBC WITH DIFFERENTIAL    Collection Time: 05/16/21 11:10 AM   Result Value Ref Range    WBC 10.0 4.8 - 10.8 K/uL    RBC 5.25 4.70 - 6.10 M/uL    Hemoglobin 17.6 14.0 - 18.0 g/dL    Hematocrit 53.4 (H) 42.0 - 52.0 %    .7 (H) 81.4 - 97.8 fL    MCH 33.5 (H) 27.0 - 33.0 pg    MCHC 33.0 (L) 33.7 - 35.3 g/dL    RDW 51.2 (H) 35.9 - 50.0 fL    Platelet Count 155 (L) 164 - 446 K/uL    MPV 10.2 9.0 - 12.9 fL    Neutrophils-Polys 72.50 (H) 44.00 - 72.00 %    Lymphocytes 10.90 (L) 22.00 - 41.00 %    Monocytes 12.10 0.00 - 13.40 %    Eosinophils 3.20 0.00 - 6.90 %    Basophils 0.90 0.00 - 1.80 %    Immature Granulocytes 0.40 0.00 - 0.90 %    Nucleated RBC 0.00 /100 WBC    Neutrophils (Absolute) 7.21 1.82 - 7.42 K/uL    Lymphs (Absolute) 1.09 1.00 - 4.80 K/uL    Monos (Absolute) 1.21 (H) 0.00 - 0.85 K/uL     Eos (Absolute) 0.32 0.00 - 0.51 K/uL    Baso (Absolute) 0.09 0.00 - 0.12 K/uL    Immature Granulocytes (abs) 0.04 0.00 - 0.11 K/uL    NRBC (Absolute) 0.00 K/uL   Comp Metabolic Panel    Collection Time: 05/16/21 11:10 AM   Result Value Ref Range    Sodium 139 135 - 145 mmol/L    Potassium 4.3 3.6 - 5.5 mmol/L    Chloride 95 (L) 96 - 112 mmol/L    Co2 29 20 - 33 mmol/L    Anion Gap 15.0 7.0 - 16.0    Glucose 108 (H) 65 - 99 mg/dL    Bun 13 8 - 22 mg/dL    Creatinine 0.99 0.50 - 1.40 mg/dL    Calcium 8.8 8.4 - 10.2 mg/dL    AST(SGOT) 53 (H) 12 - 45 U/L    ALT(SGPT) 56 (H) 2 - 50 U/L    Alkaline Phosphatase 105 (H) 30 - 99 U/L    Total Bilirubin 1.2 0.1 - 1.5 mg/dL    Albumin 3.5 3.2 - 4.9 g/dL    Total Protein 6.3 6.0 - 8.2 g/dL    Globulin 2.8 1.9 - 3.5 g/dL    A-G Ratio 1.3 g/dL   MAGNESIUM    Collection Time: 05/16/21 11:10 AM   Result Value Ref Range    Magnesium 2.1 1.5 - 2.5 mg/dL   PHOSPHORUS    Collection Time: 05/16/21 11:10 AM   Result Value Ref Range    Phosphorus 3.2 2.5 - 4.5 mg/dL   Prothrombin Time    Collection Time: 05/16/21 11:10 AM   Result Value Ref Range    PT 14.7 (H) 12.0 - 14.6 sec    INR 1.18 (H) 0.87 - 1.13   ESTIMATED GFR    Collection Time: 05/16/21 11:10 AM   Result Value Ref Range    GFR If African American >60 >60 mL/min/1.73 m 2    GFR If Non African American >60 >60 mL/min/1.73 m 2       (click the triangle to expand results)    Imaging:  No orders to display     Assessment/Plan:  I anticipate this patient will require at least two midnights for appropriate medical management, necessitating inpatient admission.    MANSOOR (obstructive sleep apnea)- (present on admission)  Assessment & Plan  Noted by pulmonary, will require sleep study outpatient  Nocturnal oxygen for now    Ventricular fibrillation (HCC)- (present on admission)  Assessment & Plan  Occurred on 5/16/2021 at ICU in [facility]  Seen by cardiology at [facility] and cardioverted  Now on amiodarone drip  Cardiologist   [employer] consulted    Alcohol withdrawal (HCC)- (present on admission)  Assessment & Plan  CIWA protocol    Acute respiratory failure with hypoxia (HCC)- (present on admission)  Assessment & Plan  Likely due to pulmonary edema from heart failure  Oxygen protocol    A-fib (HCC)- (present on admission)  Assessment & Plan  Defer medical management to cardiology  Cardiologist Dr. New consulted    Acute systolic heart failure (HCC)- (present on admission)  Assessment & Plan  With concurrent pulmonary edema  Cardiologist Dr. New consulted  Heart failure protocol  Defer heart failure medications to cardiology    Obesity (BMI 30-39.9)- (present on admission)  Assessment & Plan  BMI is 39.16      VTE prophylaxis:  enoxaparin

## 2021-05-16 NOTE — CARE PLAN
Problem: Knowledge Deficit - Standard  Goal: Patient and family/care givers will demonstrate understanding of plan of care, disease process/condition, diagnostic tests and medications  Outcome: Progressing     Problem: Optimal Care for Alcohol Withdrawal  Goal: Optimal Care for the alcohol withdrawal patient  Outcome: Progressing     Problem: Seizure Precautions  Goal: Implementation of seizure precautions  Outcome: Progressing     Problem: Lifestyle Changes  Goal: Patient's ability to identify lifestyle changes and available resources to help reduce recurrence of condition will improve  Outcome: Progressing    Problem: Psychosocial  Goal: Patient's level of anxiety will decrease  Outcome: Progressing

## 2021-05-16 NOTE — PROGRESS NOTES
Reason for consultation /admission : AF RVR, HFrEF     Feels better this AM  Denies SOB, on NC, O2 in the high 90s  Negative close to 2 L yesterday  On Lasix drip    Developed ventricular fibrillation yesterday afternoon required defibrillation. He was then temporary in sinus rhythm but went back in atrial fibrillation.  Diltiazem drip appeared to help control ventricular rate but has been off after VF episode. Rate was somewhat fast overnight but per RN better after IV metoprolol this AM.  Currently venbtricular rate has mostly been in the low 100-110s or so    Denies CP or palpitations    He informed me that he is seen by a cardiologist (Dr. Sloan) last June after had episode of high heart rate not feeling well after camping trip.  In fact he was seen in urgent care here prior to that.  EKG shows sinus tachycardia with left anterior hemiblock.  According to him echocardiography at that time was unremarkable.    He has never experienced any palpitations since then.    In addition, last December he was experiencing flulike symptoms for several weeks and was referred to a cardiologist at [facility] he has an EKG done but no major cardiac work-up.    He said he has gained at least 50 pounds in the last few years.    His wife stated he snores.      Review of systems;    General: No fever, chills, no weakness, + weight gain  HENT: No sore throat, no neck pain  Eyes: No blurred vision or double vision  Heart: No palpitation, + orthopnea, +leg swelling  Lung: No productive cough, no hemoptysis  Abdomen: No abdominal pain, no nausea vomiting diarrhea or blood in stool  : No dysuria, no frequency or hesitancy, no hematuria  Musculoskeletal: No myalgia, no back pain, some joint pain  Hematology: No easy bruising  Skin: No rash or itching  Neurological: No headache, no new focal weakness or numbness  Psychological: Denies depression, anxiety or insomnia  All other review of systems are  negative      Temp:  [36.7 °C (98 °F)-37.3 °C (99.1 °F)] 37 °C (98.6 °F)  Pulse:  [] 121  Resp:  [14-56] 27  BP: ()/() 91/74  SpO2:  [72 %-99 %] 99 %  GENERAL not in acute distress, not dyspnic at rest  Head atraumatic, normocephalic  Eyes EOMI  ENT neck supple, + JVD, no carotid bruits or thyromegaly  Lung good expansion, distant sound, no rales or wheezing  Heart irregularly irregular, slightly tachycardic, distant sound, no murmur,   no gallop or rub  Abd soft, no tenderness, mass or bruits  Ext 1-2+ edema  Skin no ecchymosis or petechiae  Musculoskeletal no deformity  Neuro grossly intact  Psych normal mood, normal affect    Monitor reviewed by me showed VF yesterday and currently AF.    Labs:  Dig level yesterday 1.1  Results for JHONNY JAY JIN (MRN [id number]) as of 5/16/2021 10:23   Ref. Range 5/16/2021 04:05   Sodium Latest Ref Range: 135 - 145 mmol/L 139   Potassium Latest Ref Range: 3.6 - 5.5 mmol/L 4.1   Chloride Latest Ref Range: 96 - 112 mmol/L 96   Co2 Latest Ref Range: 20 - 33 mmol/L 31   Anion Gap Latest Ref Range: 7.0 - 16.0  12.0   Glucose Latest Ref Range: 65 - 99 mg/dL 97   Bun Latest Ref Range: 8 - 22 mg/dL 10   Creatinine Latest Ref Range: 0.50 - 1.40 mg/dL 0.78   GFR If  Latest Ref Range: >60 mL/min/1.73 m 2 >60   GFR If Non  Latest Ref Range: >60 mL/min/1.73 m 2 >60   Calcium Latest Ref Range: 8.4 - 10.2 mg/dL 8.2 (L)   AST(SGOT) Latest Ref Range: 12 - 45 U/L 59 (H)   ALT(SGPT) Latest Ref Range: 2 - 50 U/L 52 (H)   Alkaline Phosphatase Latest Ref Range: 30 - 99 U/L 97   Total Bilirubin Latest Ref Range: 0.1 - 1.5 mg/dL 1.2   Albumin Latest Ref Range: 3.2 - 4.9 g/dL 3.2   Total Protein Latest Ref Range: 6.0 - 8.2 g/dL 5.6 (L)   Globulin Latest Ref Range: 1.9 - 3.5 g/dL 2.4   A-G Ratio Latest Units: g/dL 1.3   Phosphorus Latest Ref Range: 2.5 - 4.5 mg/dL 4.1   Magnesium Latest Ref Range: 1.5 - 2.5 mg/dL 1.9         Assessment and plans    1.   Acute systolic HF due to dilated cardiomyopathy probably ETOH or tachycardia induced  Improving  Still volume overloaded but oxygenation has improved,   Continue current dose of diuretic for another 24-48 hours  BP low normal, occ in the mid 90s but asymptomatic.   Would continue current dose of ARB.    2. S/p VF arrest  Although it is somewhat likely that his LV systolic function will improve to certain extent in the future and he may not require high AICD, he would be at some risk of recurrent VF before this occurs. I believe that he should undergo further cardiac work-up i.e. excluding coronary artery disease and being evaluated by EP. He may also benefit from a Life vest. For these reasons, he should be transfered to [facility]. Will add oral amiodarone. Continue monitor BMP, magnesium    3. AF with RVR, rate somewhat fast improved with IV metoprolol  Try up-titrate carvedilol to 6.25 mg BID. Continue anticoagulation and digoxin. Check level in a few days with initiation of amiodarone. Will probbaly need to reduce dose.Try to obtain record from [facility] and Dr. Sloan's office    4. Probable sleep apnea  Per primary    Long discussion with pt and his wife about transfer, the agree with the plan.    Discussed with Dr. Hurtado and RN  Will follow    Please note that this dictation was created using voice recognition software. I have worked with consultants from the vendor as well as technical experts from [facility] to optimize the interface. I have made every reasonable attempt to correct obvious errors, but I expect that there are errors of grammar and possibly content I did not discover before finalizing the note

## 2021-05-16 NOTE — PROGRESS NOTES
Cardiology brief progress note    Patient transferred from [facility].  Seen by Dr. Russell there.  He had an episode of ventricular fibrillation requiring defibrillation.  Transferred to [facility] for coronary angiogram which we will arrange for tomorrow.  We will also request EP consultation tomorrow.  Continue amiodarone drip for now.    Please refer to Dr. Russell's note for complete details.    Genoveva Mckinley MD, [facility]  Cardiologist  [facility]

## 2021-05-16 NOTE — ASSESSMENT & PLAN NOTE
- High risk patient.   - Will continue nocturnal oxygen. Needs in-lab sleep study on discharge. Needs to be followed up as outpatient.

## 2021-05-16 NOTE — CARE PLAN
Problem: Knowledge Deficit - Standard  Goal: Patient and family/care givers will demonstrate understanding of plan of care, disease process/condition, diagnostic tests and medications  Outcome: Progressing   Pt engages and attempts to understand disease process.   Problem: Optimal Care for Alcohol Withdrawal  Goal: Optimal Care for the alcohol withdrawal patient  Outcome: Progressing   CIWA score not requiring PRN at this time.   Problem: Lifestyle Changes  Goal: Patient's ability to identify lifestyle changes and available resources to help reduce recurrence of condition will improve  Outcome: Progressing   The patient is Unstable - High likelihood or risk of patient condition declining or worsening    Shift Goals  Clinical Goals: HR within parameters  Patient Goals: No SOB or CP     Progress made toward(s) clinical / shift goals:  Pt went into V fib arrest. Pt transferred to Main for further cards work up.     Patient is not progressing towards the following goals: HR stability with regular rhythm.

## 2021-05-16 NOTE — CARE PLAN
The patient is Stable - Low risk of patient condition declining or worsening    Shift Goals  Clinical Goals: HR within parameters  Patient Goals: No SOB or CP     Progress made toward(s) clinical / shift goals:    Problem: Knowledge Deficit - Standard  Goal: Patient and family/care givers will demonstrate understanding of plan of care, disease process/condition, diagnostic tests and medications  Outcome: Progressing     Problem: Optimal Care for Alcohol Withdrawal  Goal: Optimal Care for the alcohol withdrawal patient  Outcome: Progressing     Problem: Lifestyle Changes  Goal: Patient's ability to identify lifestyle changes and available resources to help reduce recurrence of condition will improve  Outcome: Progressing     Problem: Psychosocial  Goal: Patient's level of anxiety will decrease  Outcome: Progressing  Goal: Spiritual and cultural needs incorporated into hospitalization  Outcome: Progressing     Problem: Fall Risk  Goal: Patient will remain free from falls  Outcome: Progressing     Problem: Skin Integrity  Goal: Skin integrity is maintained or improved  Outcome: Progressing     Problem: Pain - Standard  Goal: Alleviation of pain or a reduction in pain to the patient’s comfort goal  Outcome: Progressing       Patient is not progressing towards the following goals:

## 2021-05-16 NOTE — PROGRESS NOTES
"[facility] RE[facility]  Transferring facility: [facility]  Transferring physician: Dr Hurtado  Transferring facility/physician contact number: See transfer center  Chief complaint: shortness of breath  Pertinent history & patient course: 50 y.o. male who presented 5/14/2021 with worsening leg edema.   Past medical history significant for varicose veins and had surgical procedures since he was 18 years old including \"vein stripping\". He is known to have chronic leg swelling but over the last few days his swelling is worse. He is also been having increasing weight gain, progressive dyspnea and orthopnea. Now can not walk more than a block without getting SOB. No other symptoms. Denies any fevers, chills, rigors, phlegm production, viral symptoms etc. No GI or urinary symptoms. Atrial fibrillation with RVR managed since admission.  He had ventricular fibrillation today, seen by cardiology. Cardioverted.  Transferring to [facility] for evaluation by EP cardiology.  LVEF 35% with diastolic dysfunction.   Pertinent imaging & lab results: See epic  Code Status: Full per transferring provider, I personally verified with the transferring provider patient's code status and the transferring provider has confirmed this with the patient.  Further work up or recommendations per triage officer prior to transfer: Cardiology work up by EP and coronary artery disease work up.    Consultants called prior to transfer and pertinent input from consultants: Cardiology aware.  Patient accepted for transfer: Yes  Consultants to be called upon arrival: Cardiology  Admission status: Inpatient.   Floor requested: CIC  If ICU transfer, name of intensivist case discussed with and pertinent input from critical care: DR Iniguez    Please inform the triage officer upon arrival of the patient to [facility] for assignment of a hospitalist to perform admission.     For any question or " concerns regarding the care of this patient, please reach out to the assigned hospitalist.

## 2021-05-16 NOTE — FLOWSHEET NOTE
05/16/21 1144   Events/Summary/Plan   Events/Summary/Plan EtCO2 monitor placed on patient,  oxygen titrated to 4 lpm.  RN aware.   Vital Signs   Pulse (!) 106   Respiration (!) 30   Pulse Oximetry 97 %   $ Pulse Oximetry (Spot Check) Yes   CO2 Monitoring   ETCO2 (mmHg) 32   $ ETCO2 Monitoring Yes   Oxygen   O2 (LPM) 4   O2 Delivery Device Silicone Nasal Cannula

## 2021-05-16 NOTE — PROGRESS NOTES
Patient is now A&Ox4, talking on the phone with family. Safety precautions in place, Discussed POC, call light within reach. Will continue to monitor.

## 2021-05-16 NOTE — PROGRESS NOTES
"Pulmonary Critical Care Progress Note    Date of admission  5/14/2021    Chief Complaint  50 y.o. male admitted 5/14/2021 with Afib with RVR in the setting of new onset systolic heart failure and alcohol withdrawal.     Hospital Course  50 y.o. male who presented 5/14/2021 with worsening leg edema.   This pleasant gentleman has a past medical history significant for varicose veins and had surgical procedures since he was 18 years old including \"vein stripping\". He is known to have chronic leg swelling but over the last few days his swelling is worse. He is also been having increasing weight gain, progressive dyspnea and orthopnea. Now can not walk more than a block without getting SOB. No other symptoms. Denies any fevers, chills, rigors, phlegm production, viral symptoms etc. No GI or urinary symptoms.      Denies any illicit drug use except use of marijuana once or twice/year.    Patient states that he vapes occasionally and has vaped 4 days ago prior to admission.   Drinks 1-2 beers/day and on the weekends can drink upto 8 shots tequila. Never had withdrawals symptoms when stopped drinking (upto 2 weeks).     Interval Problem Update  Reviewed last 24 hour events:  Neuro: CIWA scores stable. Stared librium - continuing Ativan per CIWA.   CVS: Afib with RVR - worsened with active withdrawals. Added cardizem infusion to control rate better cautiously given poor EF. Ongoing lasix infusion at 5mg/hr for diuresis.   Pulm: Improved pulmonary edema. Continue abx for CAP. Complete 5 days in total.   Renal: Continue electrolyte replacement protocol. Start lasix infusion. Check BMP bid.       Review of Systems  Review of Systems   Constitutional: Negative.    HENT: Negative.    Eyes: Negative.    Cardiovascular: Negative.    Gastrointestinal: Negative.    Genitourinary: Negative.    Musculoskeletal: Negative.    Skin: Negative.         Vital Signs for last 24 hours   Temp:  [36.7 °C (98 °F)-37.3 °C (99.1 °F)] 37.1 °C (98.8 " °F)  Pulse:  [] 110  Resp:  [14-56] 40  BP: ()/() 112/79  SpO2:  [72 %-100 %] 100 %    Hemodynamic parameters for last 24 hours       Respiratory Information for the last 24 hours       Physical Exam   Physical Exam  Constitutional:       Appearance: He is obese.   HENT:      Head: Normocephalic and atraumatic.      Nose: Nose normal.      Mouth/Throat:      Mouth: Mucous membranes are moist.   Eyes:      Extraocular Movements: Extraocular movements intact.      Pupils: Pupils are equal, round, and reactive to light.   Cardiovascular:      Rate and Rhythm: Tachycardia present. Rhythm irregular.      Heart sounds: Normal heart sounds.   Pulmonary:      Breath sounds: Rales present.   Abdominal:      General: Abdomen is flat.      Palpations: Abdomen is soft.   Neurological:      General: No focal deficit present.      Mental Status: He is alert.   Psychiatric:         Mood and Affect: Mood normal.         Thought Content: Thought content normal.         Medications  Current Facility-Administered Medications   Medication Dose Route Frequency Provider Last Rate Last Admin   • carvedilol (COREG) tablet 6.25 mg  6.25 mg Oral BID WITH MEALS Stefania Russell M.D.       • dextrose 5% infusion   Intravenous Continuous DANN Wyatt.P.R.N.       • amiodarone (NEXTERONE) 360 mg/200 mL infusion  0.5-1 mg/min Intravenous Continuous Tracey Murrell A.P.R.N.       • amiodarone (NEXTERONE) IVPB 300 mg  300 mg Intravenous Once DANN Wyatt.P.R.N.       • melatonin tablet 5 mg  5 mg Oral Nightly Russ Garcia D.O.   5 mg at 05/15/21 0059   • chlordiazePOXIDE (LIBRIUM) capsule 50 mg  50 mg Oral Q8HRS Shmuel Hurtado M.D.   50 mg at 05/16/21 0540   • digoxin (LANOXIN) injection 250 mcg  250 mcg Intravenous DAILY AT 1800 Stefania Russell M.D.   250 mcg at 05/15/21 1750   • ampicillin/sulbactam (UNASYN) 3 g in  mL IVPB  3 g Intravenous Q6HRS Shmuel Hurtado M.D.   Stopped at  05/16/21 0610   • furosemide (LASIX) 100 mg in  mL infusion  10 mg/hr Intravenous Continuous Shmuel Hurtado M.D. 5 mL/hr at 05/16/21 0714 5 mg/hr at 05/16/21 0714   • MD Alert...ICU Electrolyte Replacement per Pharmacy   Other PHARMACY TO DOSE Shmuel Hurtado M.D.       • Metoprolol Tartrate (LOPRESSOR) injection 5 mg  5 mg Intravenous Q5 MIN PRN Shikha Lorenz M.D.   5 mg at 05/16/21 0835   • doxycycline monohydrate (ADOXA) tablet 100 mg  100 mg Oral Q12HRS Shikha Lorenz M.D.   100 mg at 05/16/21 0540   • spironolactone (ALDACTONE) tablet 25 mg  25 mg Oral Q DAY Elvis Cameron M.D.   25 mg at 05/16/21 0540   • valsartan (DIOVAN) tablet 40 mg  40 mg Oral TWICE DAILY Elvis Cameron M.D.   40 mg at 05/16/21 0540   • enoxaparin (LOVENOX) inj 120 mg  120 mg Subcutaneous Q12HRS Elvis Cameron M.D.   120 mg at 05/16/21 0541   • LORazepam (ATIVAN) tablet 0.5 mg  0.5 mg Oral Q4HRS PRN Shmuel Hurtado M.D.       • LORazepam (ATIVAN) tablet 1 mg  1 mg Oral Q4HRS PRN Shmuel Hurtado M.D.        Or   • LORazepam (ATIVAN) injection 0.5 mg  0.5 mg Intravenous Q4HRS PRN Shmuel Hurtado M.D.       • LORazepam (ATIVAN) tablet 2 mg  2 mg Oral Q2HRS PRN Shmuel Hurtado M.D.        Or   • LORazepam (ATIVAN) injection 1 mg  1 mg Intravenous Q2HRS PRN Shmuel Hurtado M.D.   1 mg at 05/15/21 0147   • LORazepam (ATIVAN) tablet 3 mg  3 mg Oral Q HOUR PRN Shmuel Hurtado M.D.        Or   • LORazepam (ATIVAN) injection 1.5 mg  1.5 mg Intravenous Q HOUR PRN Shmuel Hurtado M.D.   1.5 mg at 05/15/21 1625   • LORazepam (ATIVAN) tablet 4 mg  4 mg Oral Q15 MIN PRN Shmuel Hurtado M.D.        Or   • LORazepam (ATIVAN) injection 2 mg  2 mg Intravenous Q15 MIN PRN hSmuel Hurtado M.D.   2 mg at 05/15/21 0851       Fluids    Intake/Output Summary (Last 24 hours) at 5/16/2021 1215  Last data filed at 5/16/2021 1200  Gross per 24 hour   Intake 1365.43 ml   Output 4410 ml   Net -3044.57 ml        Laboratory          Recent Labs     05/15/21  2121 05/16/21  0405 05/16/21  1110   SODIUM 140 139 139   POTASSIUM 3.9 4.1 4.3   CHLORIDE 96 96 95*   CO2 28 31 29   BUN 9 10 13   CREATININE 0.87 0.78 0.99   MAGNESIUM 1.7 1.9 2.1   PHOSPHORUS 4.0 4.1 3.2   CALCIUM 8.7 8.2* 8.8     Recent Labs     05/15/21  2121 05/16/21  0405 05/16/21  1110   ALTSGPT 57* 52* 56*   ASTSGOT 57* 59* 53*   ALKPHOSPHAT 105* 97 105*   TBILIRUBIN 1.2 1.2 1.2   GLUCOSE 131* 97 108*     Recent Labs     05/15/21  2121 05/16/21  0405 05/16/21  1110   WBC 9.4 8.8 10.0   NEUTSPOLYS 65.50 73.70* 72.50*   LYMPHOCYTES 17.40* 8.90* 10.90*   MONOCYTES 11.60 12.30 12.10   EOSINOPHILS 3.90 3.60 3.20   BASOPHILS 1.10 0.80 0.90   ASTSGOT 57* 59* 53*   ALTSGPT 57* 52* 56*   ALKPHOSPHAT 105* 97 105*   TBILIRUBIN 1.2 1.2 1.2     Recent Labs     05/15/21  2121 05/16/21  0405 05/16/21  1110   RBC 5.36 4.56* 5.25   HEMOGLOBIN 18.4* 15.7 17.6   HEMATOCRIT 55.0* 46.3 53.4*   PLATELETCT 144* 163* 155*   PROTHROMBTM  --   --  14.7*   INR  --   --  1.18*       Imaging  X-Ray:  I have personally reviewed the images and compared with prior images.    Assessment/Plan  Ventricular tachyarrhythmia (HCC)  Assessment & Plan  - X2 episodes of V Fib in the setting of acute systolic heart failure and dilated cardiomyopathy.   - Started on Amiodarone. Will continue. Optimizing k/mag.   - Considering AICD placement, transfer to [facility] and evaluation by EP.     Alcohol withdrawal (HCC)  Assessment & Plan  - Increasing CIWA scores overnight. Required few doses of ativan over the course of last night.   - Certainly the hyperadrenergic state with active withdrawals worsened the acute HFrEF further complicated by Afib with RVR.   - Will add chlordiazepoxide to provide a more steady state and less reliance on symptoms triggered therapy using CIWA.         Acute respiratory failure with hypoxia (HCC)  Assessment & Plan  - Presented with acute hypoxic respiratory failure which is  likely related to asymmetric pulmonary edema in the setting of acute systolic heart failure, Gp II Pulmonary hypertension and obesity. Improving.   - No infectious symptoms, negative WBC count and procalcitonin, are indicative of a sterile/non infectious process.   - Pulmonary edema worsened and patient was started on lasix infusion. Good response.    - Placed the patient on BiPAP for a little period of time to improve oxygenation; to decrease work of breathing; and to improve left ventricular function. Now weaned off.       Acute systolic heart failure (HCC)  Assessment & Plan  - Patient presented with acute systolic heart failure.   - The asymmetric GGOs and consolidations on admission on the CT chest is unusual but possible.   - Worsening pulmonary edema and required to be placed on lasix infusion. Currently running at 5 mg/hr. -10L since admission.   - Will continue with Valsartan and digoxin. Dapagliflozin and Entresto on discharge.     MANSOOR (obstructive sleep apnea)  Assessment & Plan  - High risk patient.   - Will continue nocturnal oxygen. Needs in-lab sleep study on discharge. Needs to be followed up as outpatient.     A-fib (HCC)  Assessment & Plan  - Presented with afib with RVR. Further complicated by alcohol withdrawal.   - Started on Amiodarone. Rate better controlled with diltiazem and amiodarone.        VTE:  Lovenox  Ulcer: PPI  Lines: None    I have performed a physical exam and reviewed and updated ROS and Plan today (5/16/2021). In review of yesterday's note (5/15/2021), there are no changes except as documented above.     Discussed patient condition and risk of morbidity and/or mortality with Family, RN, RT, Therapies, Pharmacy and cardiology  The patient remains critically ill.  Critical care time = 61 minutes in directly providing and coordinating critical care and extensive data review.  No time overlap and excludes procedures.

## 2021-05-16 NOTE — ASSESSMENT & PLAN NOTE
EKG shows atrial fibrillation with a prolonged .  Try to avoid QT prolonging medications as much as possible.  I will switch azithromycin with doxycycline

## 2021-05-16 NOTE — FLOWSHEET NOTE
Responded to [facility]  MD and RN's at bedside  Provided manual resuscitative breathing with BVM and 100% oxygen - Not Intubated  Full CPR with chest compressions initiated.  ACLS protocols followed.  Was defibrillated due to v-fib.  Pt experienced a ROSC and resumed spontaneous breathing.  Returned to consciousness and began conversing with staff members at bedside.  Placed on 100% NRBM with SPO2 - 99%     05/15/21 2130   Intubation Assist / CPR   $ Manual Ventilation Yes   Evidence of Aspiration No

## 2021-05-17 ENCOUNTER — APPOINTMENT (OUTPATIENT)
Dept: CARDIOLOGY | Facility: MEDICAL CENTER | Age: 51
DRG: 224 | End: 2021-05-17
Attending: NURSE PRACTITIONER
Payer: COMMERCIAL

## 2021-05-17 PROBLEM — E87.8 ELECTROLYTE IMBALANCE: Status: ACTIVE | Noted: 2021-05-17

## 2021-05-17 LAB
ALBUMIN SERPL BCP-MCNC: 2.5 G/DL (ref 3.2–4.9)
ALBUMIN/GLOB SERPL: 1.1 G/DL
ALP SERPL-CCNC: 72 U/L (ref 30–99)
ALT SERPL-CCNC: 37 U/L (ref 2–50)
ANION GAP SERPL CALC-SCNC: 11 MMOL/L (ref 7–16)
APTT PPP: 37.9 SEC (ref 24.7–36)
AST SERPL-CCNC: 32 U/L (ref 12–45)
BASOPHILS # BLD AUTO: 0.9 % (ref 0–1.8)
BASOPHILS # BLD: 0.07 K/UL (ref 0–0.12)
BILIRUB SERPL-MCNC: 0.6 MG/DL (ref 0.1–1.5)
BUN SERPL-MCNC: 13 MG/DL (ref 8–22)
CALCIUM SERPL-MCNC: 7.2 MG/DL (ref 8.5–10.5)
CHLORIDE SERPL-SCNC: 100 MMOL/L (ref 96–112)
CO2 SERPL-SCNC: 28 MMOL/L (ref 20–33)
CREAT SERPL-MCNC: 0.82 MG/DL (ref 0.5–1.4)
DIGOXIN SERPL-MCNC: 0.9 NG/ML (ref 0.8–2)
EKG IMPRESSION: NORMAL
EOSINOPHIL # BLD AUTO: 0.31 K/UL (ref 0–0.51)
EOSINOPHIL NFR BLD: 4.1 % (ref 0–6.9)
ERYTHROCYTE [DISTWIDTH] IN BLOOD BY AUTOMATED COUNT: 50.5 FL (ref 35.9–50)
GLOBULIN SER CALC-MCNC: 2.2 G/DL (ref 1.9–3.5)
GLUCOSE SERPL-MCNC: 107 MG/DL (ref 65–99)
HCT VFR BLD AUTO: 41.6 % (ref 42–52)
HGB BLD-MCNC: 14.1 G/DL (ref 14–18)
IMM GRANULOCYTES # BLD AUTO: 0.04 K/UL (ref 0–0.11)
IMM GRANULOCYTES NFR BLD AUTO: 0.5 % (ref 0–0.9)
INR PPP: 1.27 (ref 0.87–1.13)
LYMPHOCYTES # BLD AUTO: 0.73 K/UL (ref 1–4.8)
LYMPHOCYTES NFR BLD: 9.7 % (ref 22–41)
MAGNESIUM SERPL-MCNC: 1.8 MG/DL (ref 1.5–2.5)
MCH RBC QN AUTO: 34.1 PG (ref 27–33)
MCHC RBC AUTO-ENTMCNC: 33.9 G/DL (ref 33.7–35.3)
MCV RBC AUTO: 100.7 FL (ref 81.4–97.8)
MONOCYTES # BLD AUTO: 0.83 K/UL (ref 0–0.85)
MONOCYTES NFR BLD AUTO: 11 % (ref 0–13.4)
NEUTROPHILS # BLD AUTO: 5.56 K/UL (ref 1.82–7.42)
NEUTROPHILS NFR BLD: 73.8 % (ref 44–72)
NRBC # BLD AUTO: 0 K/UL
NRBC BLD-RTO: 0 /100 WBC
PLATELET # BLD AUTO: 142 K/UL (ref 164–446)
PMV BLD AUTO: 10.6 FL (ref 9–12.9)
POTASSIUM SERPL-SCNC: 3.3 MMOL/L (ref 3.6–5.5)
PROT SERPL-MCNC: 4.7 G/DL (ref 6–8.2)
PROTHROMBIN TIME: 16.3 SEC (ref 12–14.6)
RBC # BLD AUTO: 4.13 M/UL (ref 4.7–6.1)
SODIUM SERPL-SCNC: 139 MMOL/L (ref 135–145)
WBC # BLD AUTO: 7.5 K/UL (ref 4.8–10.8)

## 2021-05-17 PROCEDURE — 85730 THROMBOPLASTIN TIME PARTIAL: CPT

## 2021-05-17 PROCEDURE — 700102 HCHG RX REV CODE 250 W/ 637 OVERRIDE(OP): Performed by: HOSPITALIST

## 2021-05-17 PROCEDURE — A9270 NON-COVERED ITEM OR SERVICE: HCPCS | Performed by: HOSPITALIST

## 2021-05-17 PROCEDURE — 700111 HCHG RX REV CODE 636 W/ 250 OVERRIDE (IP): Performed by: HOSPITALIST

## 2021-05-17 PROCEDURE — 99232 SBSQ HOSP IP/OBS MODERATE 35: CPT | Mod: 25 | Performed by: INTERNAL MEDICINE

## 2021-05-17 PROCEDURE — 93458 L HRT ARTERY/VENTRICLE ANGIO: CPT | Mod: 26 | Performed by: INTERNAL MEDICINE

## 2021-05-17 PROCEDURE — 80053 COMPREHEN METABOLIC PANEL: CPT

## 2021-05-17 PROCEDURE — 700111 HCHG RX REV CODE 636 W/ 250 OVERRIDE (IP)

## 2021-05-17 PROCEDURE — 99233 SBSQ HOSP IP/OBS HIGH 50: CPT | Performed by: HOSPITALIST

## 2021-05-17 PROCEDURE — 700105 HCHG RX REV CODE 258: Performed by: HOSPITALIST

## 2021-05-17 PROCEDURE — 83735 ASSAY OF MAGNESIUM: CPT

## 2021-05-17 PROCEDURE — B2151ZZ FLUOROSCOPY OF LEFT HEART USING LOW OSMOLAR CONTRAST: ICD-10-PCS | Performed by: INTERNAL MEDICINE

## 2021-05-17 PROCEDURE — 85025 COMPLETE CBC W/AUTO DIFF WBC: CPT

## 2021-05-17 PROCEDURE — 4A023N7 MEASUREMENT OF CARDIAC SAMPLING AND PRESSURE, LEFT HEART, PERCUTANEOUS APPROACH: ICD-10-PCS | Performed by: INTERNAL MEDICINE

## 2021-05-17 PROCEDURE — B2111ZZ FLUOROSCOPY OF MULTIPLE CORONARY ARTERIES USING LOW OSMOLAR CONTRAST: ICD-10-PCS | Performed by: INTERNAL MEDICINE

## 2021-05-17 PROCEDURE — 80162 ASSAY OF DIGOXIN TOTAL: CPT

## 2021-05-17 PROCEDURE — A9270 NON-COVERED ITEM OR SERVICE: HCPCS

## 2021-05-17 PROCEDURE — 700102 HCHG RX REV CODE 250 W/ 637 OVERRIDE(OP)

## 2021-05-17 PROCEDURE — 93005 ELECTROCARDIOGRAM TRACING: CPT | Performed by: NURSE PRACTITIONER

## 2021-05-17 PROCEDURE — 99152 MOD SED SAME PHYS/QHP 5/>YRS: CPT | Performed by: INTERNAL MEDICINE

## 2021-05-17 PROCEDURE — 770022 HCHG ROOM/CARE - ICU (200)

## 2021-05-17 PROCEDURE — 93458 L HRT ARTERY/VENTRICLE ANGIO: CPT

## 2021-05-17 PROCEDURE — 700101 HCHG RX REV CODE 250

## 2021-05-17 PROCEDURE — 85610 PROTHROMBIN TIME: CPT

## 2021-05-17 PROCEDURE — 93010 ELECTROCARDIOGRAM REPORT: CPT | Performed by: INTERNAL MEDICINE

## 2021-05-17 RX ORDER — MIDAZOLAM HYDROCHLORIDE 1 MG/ML
INJECTION INTRAMUSCULAR; INTRAVENOUS
Status: COMPLETED
Start: 2021-05-17 | End: 2021-05-17

## 2021-05-17 RX ORDER — ASPIRIN 81 MG/1
TABLET, CHEWABLE ORAL
Status: COMPLETED
Start: 2021-05-17 | End: 2021-05-17

## 2021-05-17 RX ORDER — LIDOCAINE HYDROCHLORIDE 20 MG/ML
INJECTION, SOLUTION INFILTRATION; PERINEURAL
Status: COMPLETED
Start: 2021-05-17 | End: 2021-05-17

## 2021-05-17 RX ORDER — GAUZE BANDAGE 2" X 2"
100 BANDAGE TOPICAL DAILY
Status: DISCONTINUED | OUTPATIENT
Start: 2021-05-17 | End: 2021-05-20 | Stop reason: HOSPADM

## 2021-05-17 RX ORDER — ASPIRIN 81 MG/1
TABLET, CHEWABLE ORAL
Status: DISPENSED
Start: 2021-05-17 | End: 2021-05-18

## 2021-05-17 RX ORDER — VERAPAMIL HYDROCHLORIDE 2.5 MG/ML
INJECTION, SOLUTION INTRAVENOUS
Status: COMPLETED
Start: 2021-05-17 | End: 2021-05-17

## 2021-05-17 RX ORDER — POTASSIUM CHLORIDE 20 MEQ/1
40 TABLET, EXTENDED RELEASE ORAL ONCE
Status: COMPLETED | OUTPATIENT
Start: 2021-05-17 | End: 2021-05-17

## 2021-05-17 RX ORDER — HEPARIN SODIUM 200 [USP'U]/100ML
INJECTION, SOLUTION INTRAVENOUS
Status: COMPLETED
Start: 2021-05-17 | End: 2021-05-17

## 2021-05-17 RX ORDER — HEPARIN SODIUM 1000 [USP'U]/ML
INJECTION, SOLUTION INTRAVENOUS; SUBCUTANEOUS
Status: COMPLETED
Start: 2021-05-17 | End: 2021-05-17

## 2021-05-17 RX ORDER — CHLORDIAZEPOXIDE HYDROCHLORIDE 25 MG/1
25 CAPSULE, GELATIN COATED ORAL EVERY 8 HOURS
Status: DISCONTINUED | OUTPATIENT
Start: 2021-05-17 | End: 2021-05-20 | Stop reason: HOSPADM

## 2021-05-17 RX ORDER — FOLIC ACID 1 MG/1
1 TABLET ORAL DAILY
Status: DISCONTINUED | OUTPATIENT
Start: 2021-05-17 | End: 2021-05-20 | Stop reason: HOSPADM

## 2021-05-17 RX ORDER — MAGNESIUM SULFATE HEPTAHYDRATE 40 MG/ML
2 INJECTION, SOLUTION INTRAVENOUS ONCE
Status: COMPLETED | OUTPATIENT
Start: 2021-05-17 | End: 2021-05-17

## 2021-05-17 RX ADMIN — MAGNESIUM SULFATE 2 G: 2 INJECTION INTRAVENOUS at 10:31

## 2021-05-17 RX ADMIN — NITROGLYCERIN 10 ML: 20 INJECTION INTRAVENOUS at 16:45

## 2021-05-17 RX ADMIN — LIDOCAINE HYDROCHLORIDE: 20 INJECTION, SOLUTION INFILTRATION; PERINEURAL at 16:45

## 2021-05-17 RX ADMIN — AMPICILLIN AND SULBACTAM 3 G: 2; 1 INJECTION, POWDER, FOR SOLUTION INTRAVENOUS at 05:48

## 2021-05-17 RX ADMIN — CARVEDILOL 6.25 MG: 6.25 TABLET, FILM COATED ORAL at 17:59

## 2021-05-17 RX ADMIN — VERAPAMIL HYDROCHLORIDE 2.5 MG: 2.5 INJECTION, SOLUTION INTRAVENOUS at 16:45

## 2021-05-17 RX ADMIN — HEPARIN SODIUM: 1000 INJECTION, SOLUTION INTRAVENOUS; SUBCUTANEOUS at 16:45

## 2021-05-17 RX ADMIN — THERA TABS 1 TABLET: TAB at 18:13

## 2021-05-17 RX ADMIN — VALSARTAN 40 MG: 80 TABLET, FILM COATED ORAL at 17:58

## 2021-05-17 RX ADMIN — CHLORDIAZEPOXIDE HYDROCHLORIDE 50 MG: 25 CAPSULE ORAL at 14:23

## 2021-05-17 RX ADMIN — FENTANYL CITRATE 100 MCG: 50 INJECTION, SOLUTION INTRAMUSCULAR; INTRAVENOUS at 16:42

## 2021-05-17 RX ADMIN — LORAZEPAM 1 MG: 1 TABLET ORAL at 21:14

## 2021-05-17 RX ADMIN — DOXYCYCLINE 100 MG: 100 TABLET, FILM COATED ORAL at 17:58

## 2021-05-17 RX ADMIN — AMIODARONE HYDROCHLORIDE 0.5 MG/MIN: 1.8 INJECTION, SOLUTION INTRAVENOUS at 21:16

## 2021-05-17 RX ADMIN — MIDAZOLAM HYDROCHLORIDE 2 MG: 1 INJECTION, SOLUTION INTRAMUSCULAR; INTRAVENOUS at 16:42

## 2021-05-17 RX ADMIN — DEXTROSE MONOHYDRATE: 50 INJECTION, SOLUTION INTRAVENOUS at 09:20

## 2021-05-17 RX ADMIN — AMPICILLIN AND SULBACTAM 3 G: 2; 1 INJECTION, POWDER, FOR SOLUTION INTRAVENOUS at 12:21

## 2021-05-17 RX ADMIN — AMIODARONE HYDROCHLORIDE 0.5 MG/MIN: 1.8 INJECTION, SOLUTION INTRAVENOUS at 08:08

## 2021-05-17 RX ADMIN — FOLIC ACID 1 MG: 1 TABLET ORAL at 17:58

## 2021-05-17 RX ADMIN — CHLORDIAZEPOXIDE HYDROCHLORIDE 50 MG: 25 CAPSULE ORAL at 05:53

## 2021-05-17 RX ADMIN — CARVEDILOL 6.25 MG: 6.25 TABLET, FILM COATED ORAL at 08:08

## 2021-05-17 RX ADMIN — SPIRONOLACTONE 25 MG: 25 TABLET ORAL at 05:52

## 2021-05-17 RX ADMIN — VALSARTAN 40 MG: 80 TABLET, FILM COATED ORAL at 05:52

## 2021-05-17 RX ADMIN — CHLORDIAZEPOXIDE HYDROCHLORIDE 25 MG: 25 CAPSULE ORAL at 21:14

## 2021-05-17 RX ADMIN — AMPICILLIN AND SULBACTAM 3 G: 2; 1 INJECTION, POWDER, FOR SOLUTION INTRAVENOUS at 17:59

## 2021-05-17 RX ADMIN — Medication 5 MG: at 21:14

## 2021-05-17 RX ADMIN — ENOXAPARIN SODIUM 120 MG: 120 INJECTION SUBCUTANEOUS at 05:54

## 2021-05-17 RX ADMIN — THIAMINE HCL TAB 100 MG 100 MG: 100 TAB at 17:58

## 2021-05-17 RX ADMIN — ASPIRIN 324 MG: 81 TABLET, CHEWABLE ORAL at 16:43

## 2021-05-17 RX ADMIN — DOXYCYCLINE 100 MG: 100 TABLET, FILM COATED ORAL at 05:52

## 2021-05-17 RX ADMIN — HEPARIN SODIUM: 200 INJECTION, SOLUTION INTRAVENOUS at 14:15

## 2021-05-17 RX ADMIN — POTASSIUM CHLORIDE 40 MEQ: 1500 TABLET, EXTENDED RELEASE ORAL at 10:31

## 2021-05-17 ASSESSMENT — LIFESTYLE VARIABLES
NAUSEA AND VOMITING: NO NAUSEA AND NO VOMITING
ORIENTATION AND CLOUDING OF SENSORIUM: ORIENTED AND CAN DO SERIAL ADDITIONS
TOTAL SCORE: 1
NAUSEA AND VOMITING: NO NAUSEA AND NO VOMITING
PAROXYSMAL SWEATS: NO SWEAT VISIBLE
PAROXYSMAL SWEATS: NO SWEAT VISIBLE
TREMOR: NO TREMOR
VISUAL DISTURBANCES: VERY MILD SENSITIVITY
ORIENTATION AND CLOUDING OF SENSORIUM: ORIENTED AND CAN DO SERIAL ADDITIONS
AGITATION: NORMAL ACTIVITY
TOTAL SCORE: 4
HEADACHE, FULLNESS IN HEAD: NOT PRESENT
TREMOR: TREMOR NOT VISIBLE BUT CAN BE FELT, FINGERTIP TO FINGERTIP
ANXIETY: MILDLY ANXIOUS
HEADACHE, FULLNESS IN HEAD: NOT PRESENT
NAUSEA AND VOMITING: NO NAUSEA AND NO VOMITING
HEADACHE, FULLNESS IN HEAD: NOT PRESENT
AUDITORY DISTURBANCES: NOT PRESENT
NAUSEA AND VOMITING: NO NAUSEA AND NO VOMITING
VISUAL DISTURBANCES: NOT PRESENT
PAROXYSMAL SWEATS: NO SWEAT VISIBLE
VISUAL DISTURBANCES: NOT PRESENT
TREMOR: NO TREMOR
HEADACHE, FULLNESS IN HEAD: NOT PRESENT
TOTAL SCORE: 1
VISUAL DISTURBANCES: NOT PRESENT
TOTAL SCORE: 1
PAROXYSMAL SWEATS: NO SWEAT VISIBLE
HEADACHE, FULLNESS IN HEAD: NOT PRESENT
ANXIETY: MILDLY ANXIOUS
ORIENTATION AND CLOUDING OF SENSORIUM: ORIENTED AND CAN DO SERIAL ADDITIONS
AUDITORY DISTURBANCES: NOT PRESENT
ORIENTATION AND CLOUDING OF SENSORIUM: ORIENTED AND CAN DO SERIAL ADDITIONS
TOTAL SCORE: 9
AUDITORY DISTURBANCES: NOT PRESENT
ANXIETY: MILDLY ANXIOUS
PAROXYSMAL SWEATS: NO SWEAT VISIBLE
AGITATION: *
TREMOR: NO TREMOR
TREMOR: NO TREMOR
HEADACHE, FULLNESS IN HEAD: VERY MILD
AUDITORY DISTURBANCES: NOT PRESENT
VISUAL DISTURBANCES: NOT PRESENT
TOTAL SCORE: 1
AUDITORY DISTURBANCES: NOT PRESENT
ANXIETY: *
AGITATION: NORMAL ACTIVITY
ORIENTATION AND CLOUDING OF SENSORIUM: ORIENTED AND CAN DO SERIAL ADDITIONS
AGITATION: NORMAL ACTIVITY
ORIENTATION AND CLOUDING OF SENSORIUM: ORIENTED AND CAN DO SERIAL ADDITIONS
TREMOR: TREMOR NOT VISIBLE BUT CAN BE FELT, FINGERTIP TO FINGERTIP
VISUAL DISTURBANCES: NOT PRESENT
AUDITORY DISTURBANCES: NOT PRESENT
ANXIETY: MILDLY ANXIOUS
PAROXYSMAL SWEATS: BARELY PERCEPTIBLE SWEATING. PALMS MOIST
AGITATION: NORMAL ACTIVITY
AGITATION: *
NAUSEA AND VOMITING: NO NAUSEA AND NO VOMITING
ANXIETY: NO ANXIETY (AT EASE)
SUBSTANCE_ABUSE: 1
NAUSEA AND VOMITING: NO NAUSEA AND NO VOMITING

## 2021-05-17 ASSESSMENT — PAIN DESCRIPTION - PAIN TYPE
TYPE: ACUTE PAIN

## 2021-05-17 ASSESSMENT — ENCOUNTER SYMPTOMS
WEAKNESS: 0
CARDIOVASCULAR NEGATIVE: 1
WHEEZING: 0
MUSCULOSKELETAL NEGATIVE: 1
COUGH: 0
NERVOUS/ANXIOUS: 1
FATIGUE: 1
SHORTNESS OF BREATH: 1
NUMBNESS: 0
TROUBLE SWALLOWING: 0
VOMITING: 0
RESPIRATORY NEGATIVE: 1
SPEECH DIFFICULTY: 0
CHILLS: 0
SHORTNESS OF BREATH: 0
NEUROLOGICAL NEGATIVE: 1
BLOOD IN STOOL: 0
GASTROINTESTINAL NEGATIVE: 1
DIZZINESS: 0
ABDOMINAL PAIN: 0
LIGHT-HEADEDNESS: 0
EYES NEGATIVE: 1
FEVER: 0
PALPITATIONS: 0
NAUSEA: 0
BRUISES/BLEEDS EASILY: 0

## 2021-05-17 ASSESSMENT — FIBROSIS 4 INDEX: FIB4 SCORE: 1.78

## 2021-05-17 NOTE — DISCHARGE SUMMARY
DATE OF ADMISSION:  05/14/2021   DATE OF DISCHARGE:  05/16/2021     CHIEF COMPLAINT ON ADMISSION:  This is a 50-year-old male who was admitted on   05/14/2021 with AFib with RVR in the setting of new onset systolic heart   failure and alcohol withdrawal and major symptom on presentation was worsening   leg edema.     HOSPITAL COURSE:  This is a 50-year-old gentleman who presented on 05/14/2021   with worsening leg edema.  He has a past medical history significant for   varicose vein and multiple surgical procedures done since he was 18 years of   age, including vein stripping.  He is known to have this chronic leg swelling,   but over the last few days, the swelling was getting worse.  He was also   having increasing weight gain, progressive dyspnea and orthopnea.  He was also   finding it difficult to walk more than a city block without getting short of   breath.  There were no other symptoms.  He denied any fevers, chills, rigors,   sputum production, viral symptoms, etc.  The patient denied any illicit drug   use except for use of marijuana once or twice per year.  The patient did admit   to drinking excessively.  He drinks 1 to 2 drinks every day, another weekend   he can drink several shots of tequila.  He denied having any withdrawal   symptoms when he stopped drinking, etc.     On admission, the patient had an echocardiogram done that showed significantly   reduced ejection fraction between 30-35% and was seen by cardiology.  He was   started on digoxin for rate control, but the patient continues to remain   tachycardic.  Initially, we avoided diltiazem, but given his progressive   uncontrolled heart rate, we added diltiazem.  The patient was also started on   IV Lasix infusion.  He has significantly improved response in pulmonary edema   and the patient was initially placed on noninvasive ventilation, but then was   weaned off of noninvasive ventilation and was placed on high flow nasal   cannula.  Over the  last 24 hours, the patient had two episodes of ventricular   fibrillation.  He was shocked both times.  The patient was seen by cardiology   who recommended transfer to [facility] for further   evaluation and management.        ______________________________  MD FELICIA Varghese/TONO/AMI    DD:  05/16/2021 14:34  DT:  05/16/2021 18:25    Job#:  [id number]

## 2021-05-17 NOTE — PROGRESS NOTES
[facility] Medicine Daily Progress Note    Date of Service  5/17/2021    Chief Complaint  50 y.o. male admitted 5/16/2021 with cardiac arrhythmia, V. fib, CHF    Hospital Course  50 y.o. male who presented 5/16/2021 as a transfer from [facility] for V. fib.  Patient was initially admitted on 5/14/2021 to [facility] for A. fib RVR as well as systolic heart failure and alcohol withdrawal.  At that time, patient was being treated in the ICU with CIWA protocol.  Patient was given Cardizem for rate control as well.  However on 5/16/2021, patient converted into V. fib.  He became unresponsive and required hand ventilation.  Initially had pulses but then became pulseless.  He was cardioverted and experienced ROSC and resumed spontaneous breathing.  And then placed on amiodarone drip.  He was thus transferred over to [facility] for further cardiology and EP consult.   The patient is significant alcohol abuse, is somewhat minimizing the amount, does have a family history of heart disease on various family members..  Patient is found with a ejection fraction of 35%.  Interval Problem Update  Patient seen and examined today.    Patient tolerating treatment and therapies.  All Data, Medication data reviewed.  Case discussed with nursing as available.  Plan of Care reviewed with patient and notified of changes.  5/17 the patient is afebrile, heart rate improved to the 80s currently in a sinus rhythm on amiodarone drip, respiration mid 20s unlabored, low-flow oxygen 3 L, blood pressure soft, 90s to low 100s over 80s, patient scheduled for coronary angiogram this afternoon, cardiology evaluating, EP cardiology evaluating for possible AICD placement.  Laboratory data reviewed, significant microcytosis, improving thrombocytopenia, potassium 3.3 being replaced, magnesium being replaced, ongoing antibiotics for possible aspiration, medication regimen for alcohol withdrawal ongoing, heart failure treatment as well as  antiarrhythmics with amiodarone.    Consultants/Specialty  Intensivist  Cardiology  EP cardiology  Code Status  Full Code    Disposition  Possibly home within the next 2 to 3 days pending further events and response to medical treatment    Review of Systems  Review of Systems   Constitutional: Positive for malaise/fatigue.   HENT: Negative.    Eyes: Negative.    Respiratory: Positive for shortness of breath.    Cardiovascular: Positive for leg swelling. Negative for chest pain.   Gastrointestinal: Negative.    Genitourinary: Negative.    Musculoskeletal: Negative.    Skin: Negative.    Neurological: Negative.    Endo/Heme/Allergies: Negative.    Psychiatric/Behavioral: Positive for substance abuse. The patient is nervous/anxious.    All other systems reviewed and are negative.       Physical Exam  Temp:  [35.8 °C (96.5 °F)-36.8 °C (98.2 °F)] 35.8 °C (96.5 °F)  Pulse:  [] 89  Resp:  [13-32] 23  BP: ()/(47-96) 106/69  SpO2:  [87 %-100 %] 100 %    Physical Exam  Vitals and nursing note reviewed.   Constitutional:       Appearance: He is well-developed.   HENT:      Head: Normocephalic.   Eyes:      Pupils: Pupils are equal, round, and reactive to light.   Cardiovascular:      Rate and Rhythm: Normal rate and regular rhythm.      Heart sounds: Normal heart sounds.   Pulmonary:      Effort: Pulmonary effort is normal.      Breath sounds: Rhonchi present.   Abdominal:      General: Bowel sounds are normal.      Palpations: Abdomen is soft.   Genitourinary:     Penis: Normal.       Rectum: Normal.   Musculoskeletal:         General: Normal range of motion.      Cervical back: Normal range of motion.   Skin:     General: Skin is warm.   Neurological:      Mental Status: He is alert and oriented to person, place, and time.         Fluids    Intake/Output Summary (Last 24 hours) at 5/17/2021 1502  Last data filed at 5/17/2021 0600  Gross per 24 hour   Intake 230.82 ml   Output 600 ml   Net -369.18 ml        Laboratory  Recent Labs     05/16/21  0405 05/16/21  1110 05/17/21  0320   WBC 8.8 10.0 7.5   RBC 4.56* 5.25 4.13*   HEMOGLOBIN 15.7 17.6 14.1   HEMATOCRIT 46.3 53.4* 41.6*   .5* 101.7* 100.7*   MCH 34.4* 33.5* 34.1*   MCHC 33.9 33.0* 33.9   RDW 50.9* 51.2* 50.5*   PLATELETCT 163* 155* 142*   MPV 10.2 10.2 10.6     Recent Labs     05/16/21  0405 05/16/21  1110 05/17/21  0320   SODIUM 139 139 139   POTASSIUM 4.1 4.3 3.3*   CHLORIDE 96 95* 100   CO2 31 29 28   GLUCOSE 97 108* 107*   BUN 10 13 13   CREATININE 0.78 0.99 0.82   CALCIUM 8.2* 8.8 7.2*     Recent Labs     05/16/21  1110 05/17/21  0320   APTT  --  37.9*   INR 1.18* 1.27*               Imaging  CL-LEFT HEART CATHETERIZATION WITH POSSIBLE INTERVENTION    (Results Pending)        Assessment/Plan  Acute respiratory failure with hypoxia (HCC)- (present on admission)  Assessment & Plan  Likely due to pulmonary edema from heart failure  Oxygen protocol  Improved, ongoing medical treatment    Electrolyte imbalance  Assessment & Plan  Optimize hypokalemia, hypomagnesemia in light of cardiac dysrhythmia    MANSOOR (obstructive sleep apnea)- (present on admission)  Assessment & Plan  Noted by pulmonary, will require sleep study outpatient  Nocturnal oxygen for now    Ventricular fibrillation (HCC)- (present on admission)  Assessment & Plan  Occurred on 5/16/2021 in ICU at [facility]  Seen by cardiology at [facility] and cardioverted  Now on amiodarone drip  Cardiology following, EP cardiology consulted    Alcohol withdrawal (HCC)- (present on admission)  Assessment & Plan  [facility] protocol    A-fib (HCC)- (present on admission)  Assessment & Plan  Defer medical management to cardiology  Cardiologist Dr. New consulted    Acute systolic heart failure (HCC)- (present on admission)  Assessment & Plan  With concurrent pulmonary edema, EF 35%   Cardiology consulting  Heart failure protocol  GDMT, coronary angiogram to evaluate for ischemic  component versus alcoholic    Obesity (BMI 30-39.9)- (present on admission)  Assessment & Plan  BMI is 39.16     Plan  Continue amiodarone drip, telemetry monitoring  Await cardiac catheterization outcome  Heart failure medication titration as tolerated  Continue CIWA protocol with titration of medication as tolerated  See orders  Medically complex high risk patient    VTE prophylaxis: Lovenox full dose    I have performed a physical exam and reviewed and updated ROS and Plan today . In review of yesterday's note , there are no changes except as documented above.        Please note that this dictation was created using voice recognition software. I have made every reasonable attempt to correct obvious errors, but I expect that there are errors of grammar and possibly context that I did not discover before finalizing the note.

## 2021-05-17 NOTE — PROGRESS NOTES
Cardiology Follow Up Progress Note    Date of Service  5/17/2021    Attending Physician  Jey Huston M.D.    Chief Complaint/Reason for EP consult:   VF cardiac arrest     HPI  Ross Blake is a 50 y.o. male admitted 5/16/2021 with leg swelling and exertional shortness of breath.  He was admitted to [facility] and found to have reduced left ventricular ejection fraction with left ventricular ejection fraction of 35% on transthoracic echo.  He additionally was found to be in atrial fibrillation with RVR.  He was started on IV diuresis and initially digoxin and betablocker's for rate control with transition to diltiazem for further rate control.  While inpt he started to have evidence of alcohol withdrawal and was treated with CIWA protocol.  On 5/15/21he was noted to have ventricular fibrillation x 2 for which he was successfully defibrillated each time.  He was transferred to [facility] for cardiac care.        Interim Events  Monitored rhythm: Sinus.  No arrhthymias overnight.   Remain on IV amiodarone 0.5 mg/min.   Denies dyspnea, chest pain, dizziness or palpitations today.  States LE edema improved.   Labs reviewed      Review of Systems  Review of Systems   Constitutional: Positive for fatigue. Negative for chills and fever.   HENT: Negative for trouble swallowing.    Respiratory: Negative for cough, shortness of breath and wheezing.    Cardiovascular: Positive for leg swelling. Negative for chest pain and palpitations.   Gastrointestinal: Negative for abdominal pain, blood in stool, nausea and vomiting.   Neurological: Negative for dizziness, syncope, speech difficulty, weakness, light-headedness and numbness.       Vital signs in last 24 hours  Temp:  [35.8 °C (96.5 °F)-36.8 °C (98.2 °F)] 35.8 °C (96.5 °F)  Pulse:  [] 93  Resp:  [13-32] 23  BP: ()/(47-96) 92/71  SpO2:  [91 %-97 %] 91 %    Physical Exam  Physical Exam  Vitals and nursing note reviewed.   Constitutional:       Appearance:  Normal appearance.   HENT:      Head: Normocephalic and atraumatic.   Eyes:      Conjunctiva/sclera: Conjunctivae normal.      Pupils: Pupils are equal, round, and reactive to light.   Cardiovascular:      Rate and Rhythm: Normal rate and regular rhythm.      Pulses: Normal pulses.      Heart sounds: Normal heart sounds. No murmur heard.   No friction rub. No gallop.    Pulmonary:      Effort: Pulmonary effort is normal.      Breath sounds: Normal breath sounds. No wheezing, rhonchi or rales.   Abdominal:      General: Bowel sounds are normal.   Musculoskeletal:         General: Normal range of motion.      Cervical back: Normal range of motion.      Right lower leg: Edema (2-3+ taught edema ) present.      Left lower leg: Edema (2-3+ Taught edema ) present.   Skin:     General: Skin is warm and dry.      Comments: Bilateral lower extremities with dark purplish discoloring     Neurological:      Mental Status: He is alert and oriented to person, place, and time.   Psychiatric:         Mood and Affect: Mood normal.         Behavior: Behavior normal.         Thought Content: Thought content normal.         Judgment: Judgment normal.         Lab Review  Lab Results   Component Value Date/Time    WBC 7.5 05/17/2021 03:20 AM    RBC 4.13 (L) 05/17/2021 03:20 AM    HEMOGLOBIN 14.1 05/17/2021 03:20 AM    HEMATOCRIT 41.6 (L) 05/17/2021 03:20 AM    .7 (H) 05/17/2021 03:20 AM    MCH 34.1 (H) 05/17/2021 03:20 AM    MCHC 33.9 05/17/2021 03:20 AM    MPV 10.6 05/17/2021 03:20 AM      Lab Results   Component Value Date/Time    SODIUM 139 05/17/2021 03:20 AM    POTASSIUM 3.3 (L) 05/17/2021 03:20 AM    CHLORIDE 100 05/17/2021 03:20 AM    CO2 28 05/17/2021 03:20 AM    GLUCOSE 107 (H) 05/17/2021 03:20 AM    BUN 13 05/17/2021 03:20 AM    CREATININE 0.82 05/17/2021 03:20 AM      Lab Results   Component Value Date/Time    ASTSGOT 32 05/17/2021 03:20 AM    ALTSGPT 37 05/17/2021 03:20 AM     Lab Results   Component Value Date/Time     TROPONINT 26 (H) 05/15/2021 06:00 AM         Cardiac Imaging and Procedures Review  Echocardiogram:  5/14/21  CONCLUSIONS  Normal left ventricular chamber size. Moderately reduced left   ventricular systolic function. Left ventricular ejection fraction is   visually estimated to be 35%.  Normal right ventricular size and systolic function.  Mild to moderate mitral regurgitation.  Estimated right ventricular systolic pressure is 45 mmHg; mild   pulmonary hypertension.  Right atrial pressure is estimated to be 15 mmHg.  No pericardial effusion seen.     No prior study is available for comparison.     Cardiac Catheterization:  Pending completion    Imaging  Chest X-Ray:  5/15/21   1.  Pulmonary edema and/or infiltrates are identified, which appear somewhat increased since the prior exam.  2.  Cardiomegaly    Assessment/Plan  1. VF Cardiac Arrest:  - Transfer to [facility] for VF cardiac arrest S/P defibrillation x 2.   - Agree with plan for C today given VF arrest and depressed LVEF on echo.  - No outright evidence of significantly long QT  or preexcitation on EKG, normal RV size and systolic function on TTE.  No significant electrolyte derangements noted on lab.   - Recheck EKG today.   - Consideration for secondary prevention ICD after LHC completed to eval ischemic status.  - Continue IV Amiodarone for now, anticipate transition to PO tomorrow if rhythm remains stable.    - Remains in Sinus at this time.   - Keep electrolytes optimized- K and Mag supplemented today.      2. Acute congestive Heart Failure:  - Etiology unclear at this time- Consider tachycardia mediated vs alcohol mediated cardiomyopathy. LHC to eval for ischemia today.   - He has been started on Coreg, ARB and Jacinto for GDMT.  Management per primary cards team.      NGUYEN Guerin.   [facility]  [phone]

## 2021-05-17 NOTE — PROGRESS NOTES
Cardiology Follow Up Progress Note    Date of Service  5/17/2021    Attending Physician  Jye Huston M.D.        HPI  Ross Blake is a 50 y.o. male transferred from [facility] for evaluation of new onset of systolic heart failure and episode of ventricular fibrillation.  He was hospitalized at [facility] on May 14 with a history of worsening bilateral edema and exertional dyspnea.  He has a history of known left bundle branch block which was evaluated by cardiology prior to admission.  Echo admission showed an EF of 35% with global hypokinesis.  He was in atrial fibrillation with rapid ventricular response at the time of admission.  On 5/15, he was found to be pulseless and was found to be in ventricular fibrillation.  CPR was started immediately and he was reverted to sinus rhythm with electrocardioversion and has remained in sinus rhythm since.  The patient is scheduled for angiography today to determine if he has multivessel coronary disease as a cause for his systolic heart failure and life-threatening ventricular fibrillation.        Review of Systems  Review of Systems   Constitutional: Negative for fever.   HENT: Negative.    Eyes: Negative for visual disturbance.   Respiratory: Negative.    Cardiovascular: Negative.    Gastrointestinal: Negative for abdominal pain.   Neurological: Negative.    Hematological: Does not bruise/bleed easily.       Vital signs in last 24 hours  Temp:  [35.8 °C (96.5 °F)-36.8 °C (98.2 °F)] 35.8 °C (96.5 °F)  Pulse:  [] 93  Resp:  [13-32] 23  BP: ()/(47-96) 92/71  SpO2:  [91 %-97 %] 91 %    Physical Exam  Physical Exam  Constitutional:       General: He is not in acute distress.     Appearance: He is well-developed. He is not diaphoretic.   HENT:      Head: Atraumatic.   Eyes:      Conjunctiva/sclera: Conjunctivae normal.      Pupils: Pupils are equal, round, and reactive to light.   Neck:      Vascular: No JVD.   Cardiovascular:      Rate and Rhythm:  Normal rate and regular rhythm.      Heart sounds: No murmur heard.     Pulmonary:      Effort: Pulmonary effort is normal. No respiratory distress.      Breath sounds: Normal breath sounds. No wheezing or rales.   Abdominal:      Palpations: Abdomen is soft.      Tenderness: There is no abdominal tenderness.   Musculoskeletal:      Cervical back: Neck supple.      Right lower le+ Edema present.      Left lower le+ Edema present.   Skin:     General: Skin is warm and dry.   Neurological:      Mental Status: He is alert and oriented to person, place, and time.   Psychiatric:         Mood and Affect: Mood is anxious.         Lab Review  Lab Results   Component Value Date/Time    WBC 7.5 2021 03:20 AM    RBC 4.13 (L) 2021 03:20 AM    HEMOGLOBIN 14.1 2021 03:20 AM    HEMATOCRIT 41.6 (L) 2021 03:20 AM    .7 (H) 2021 03:20 AM    MCH 34.1 (H) 2021 03:20 AM    MCHC 33.9 2021 03:20 AM    MPV 10.6 2021 03:20 AM      Lab Results   Component Value Date/Time    SODIUM 139 2021 03:20 AM    POTASSIUM 3.3 (L) 2021 03:20 AM    CHLORIDE 100 2021 03:20 AM    CO2 28 2021 03:20 AM    GLUCOSE 107 (H) 2021 03:20 AM    BUN 13 2021 03:20 AM    CREATININE 0.82 2021 03:20 AM      Lab Results   Component Value Date/Time    ASTSGOT 32 2021 03:20 AM    ALTSGPT 37 2021 03:20 AM     Lab Results   Component Value Date/Time    TROPONINT 26 (H) 05/15/2021 06:00 AM     EKG admission: Personally reviewed.  My interpretation is that this demonstrates atrial fibrillation with rapid ventricular response, left axis deviation with possible left anterior fascicular block, nonspecific ST changes.    Impression:    Systolic heart failure, acute: Etiology is uncertain.  It may be secondary to tachycardia induced heart failure, although alcoholic cardiomyopathy is also strong consideration.  As noted above, he will go undergo angiography today to  exclude multivessel coronary artery disease as a cause for his heart failure.  The patient has been started on carvedilol, valsartan, and spironolactone for his heart failure.  At this point digoxin can be discontinued as he remains in sinus rhythm.    Atrial fibrillation: Patient was cardioverted when h AICD.  E had sudden onset of V. fib.  He is remained in sinus rhythm since    Ventricular fibrillation: He had the sudden onset of ventricular fibrillation on May 15 and was successfully cardioverted patient.  He will be seen by our EP colleagues for consideration of placement of AICD.  The patient is on amiodarone and this will be continued for the moment.  EP consultation has been requested.      Please contact me with any questions.    Vick Alvarez M.D.   Cardiologist, [facility]  [phone]

## 2021-05-17 NOTE — CARE PLAN
The patient is Watcher - Medium risk of patient condition declining or worsening       Progressing toward goals.   Problem: Knowledge Deficit - Standard  Goal: Patient and family/care givers will demonstrate understanding of plan of care, disease process/condition, diagnostic tests and medications  Outcome: Progressing  Note: Pt and family educated on pts condition and POC including diagnostic testing. All questions answered.      Problem: Optimal Care for Alcohol Withdrawal  Goal: Optimal Care for the alcohol withdrawal patient  Outcome: Progressing  Note: Pt routinely assessed for alcohol withdrawal and escalation PRN.

## 2021-05-17 NOTE — CARE PLAN
Problem: Knowledge Deficit - Standard  Goal: Patient and family/care givers will demonstrate understanding of plan of care, disease process/condition, diagnostic tests and medications  Outcome: Progressing     Problem: Optimal Care for Alcohol Withdrawal  Goal: Optimal Care for the alcohol withdrawal patient  Outcome: Progressing     Problem: Seizure Precautions  Goal: Implementation of seizure precautions  Outcome: Progressing     Problem: Lifestyle Changes  Goal: Patient's ability to identify lifestyle changes and available resources to help reduce recurrence of condition will improve  Outcome: Progressing     Problem: Psychosocial  Goal: Patient's level of anxiety will decrease  Outcome: Progressing  Goal: Spiritual and cultural needs incorporated into hospitalization  Outcome: Progressing     Problem: Risk for Aspiration  Goal: Patient's risk for aspiration will be absent or decrease  Outcome: Progressing     Problem: Skin Integrity  Goal: Skin integrity is maintained or improved  Outcome: Progressing     Problem: Care Map:  Admission Optimal Outcome for the Heart Failure Patient  Goal: Admission:  Optimal Care of the heart failure patient  Outcome: Progressing     Problem: Care Map:  Day 1 Optimal Outcome for the Heart Failure Patient  Goal: Day 1:  Optimal Care of the heart failure patient  Outcome: Progressing     Problem: Care Map:  Day 2 Optimal Outcome for the Heart Failure Patient  Goal: Day 2:  Optimal Care of the heart failure patient  Outcome: Progressing     Problem: Care Map:  Day 3 Optimal Outcome for the Heart Failure Patient  Goal: Day 3:  Optimal Care of the heart failure patient  Outcome: Progressing     Problem: Care Map:  Day Before Discharge Optimal Outcome for the Heart Failure Patient  Goal: Day Before Discharge:  Optimal Care of the heart failure patient  Outcome: Progressing     Problem: Care Map:  Day of Discharge Optimal Outcome for the Heart Failure Patient  Goal: Day of Discharge:   Optimal Care of the heart failure patient  Outcome: Progressing     Problem: Pain - Standard  Goal: Alleviation of pain or a reduction in pain to the patient’s comfort goal  Outcome: Progressing   The patient is Watcher - Medium risk of patient condition declining or worsening         Progress made toward(s) clinical / shift goals:  Education about procedures.    Patient is not progressing towards the following goals:

## 2021-05-17 NOTE — HEART FAILURE PROGRAM
New diagnosis of heart failure with reduced ejection fraction (HFrEF) in the setting of excessive ETOH use per notes from Dr. Alvarez and Dr. Cameron. He has also had AF c RVR.    Patient had been at our [facility] ICU undergoing ETOH withdrawal when on 5/16/21 he had v fib arrest.     Patient is having angiogram today to assess for ischemia per Dr. Alvarez, tachycardia and alcoholic cardiomyopathy are also possible candidates for etiology.    Patient will also have EP (cardiac electrophysiology) consult.    Nursing: please complete the HF Discharge Checklist (pink sheet in hard chart) and have it cosigned by your charge RN before patient leaves the hospital.    Providers: below are all Guideline Directed Medical Therapy (GDMT) indicated for HFrEF. If any can not be prescribed by discharge, please note the clinical reason for each in your discharge summary.    QUICK SUMMARY OF HFrEF MEASURES    -- Evidence Based Beta Blocker (bisoprolol, carvedilol, or toprol xl), for EF of 40% or less  -- VIKRAM - I, for EF of 40% or less  -- Aldosterone antagonist, for EF of 35% or less  -- Anticoagulation for atrial arrhythmia, if applicable  -- Glycemic control for DM + HF, if diabetic  -- Lipid lowering medication for DM + HF, if diabetic  -- Hydralazine dinitrate, if pt self identifies as   -- Pneumococcal vaccine if not previously received  -- Influenza vaccine if not previously received for the current flu season  -- Smoking cessation counseling documented if applicable  -- Device screening if applicable    Daily Nurse: please begin to fill out the HF checklist (pink sheet in hard chart) and use it to guide your daily care.    Discharge Nurse: please ensure completeness of the HF checklist (pink sheet in hard chart) and have it co-signed by the charge RN before the patient leaves the hospital.    Thank you, Dionne, Cardio RN Navigator [id number]      DETAILED EXPLANATION OF HF MEASURES:  1. Documentation of LV  systolic function (echo or cath) PTA, during this hospitalization, or plan to assess post discharge or reason for not assessing documented  2. Documentation of fluid intake and urine output every nursing shift  3. 2 hour post diuretic assessment documented 2 hours after diuretic given  4. HF Patient Education using the Living Well With Heart Failure Booklet and Symptom Tracker documented every nursing shift  5. Nutrition consult for diet education  6. Daily weights (one weight documented every 24 hours) on a standing scale unless standing is contraindicated in which case bed scale can be used - have patient write weight on symptom tracker  7. For LVEF less than or equal to 40%, ACE-I, ARNI or ARB prescribed at discharge   8. For LVEF less than or equal to 40%, an Evidence Based Beta Blocker (bisoprolol, carvedilol, toprol xl) must be prescribed at discharge  9. For LVEF less than or equal to 35% aldosterone blockade prescribed at discharge  10. The combination of hydralazine and isosorbide dinitrate is recommended to reduce morbidity and mortality for patients self-described  Americans with NYHA class III-IV HFrEF (EF 40% or less), receiving optimal therapy with ACE inhibitors and beta blockers, unless contraindicated (Class I, MISTY: A).  11. If a HF patient is diabetic or is newly diagnosed with DM: prescribed diabetes treatment at discharge in the form of glycemic control (diet or anti-hyperglycemic medication) or f/u appointment for diabetes management scheduled at discharge.  12. If a HF patient has diabetes: prescribed lipid lowering medication at discharge  13. Documented smoking cessation advice or counseling  14. If a HF patient has a-fib: anticoagulation is prescribed upon discharge or contraindication is documented  15. Screening for and administering immunizations as long as no contraindications: Pneumonia (regardless of age) and Influenza  16. Written discharge instructions include:  ? Daily  weights  ? Record weight on tracker  ? Bring tracker to appointments  ? Call MD for weight gain of 3lb /day or 5lb/week  ? HF medication teaching  ? Low sodium diet  ? Follow up appointment within seven calendar days of d/c must include: date, time and location  ? Activity  ? Worsening symptoms    What if any of the above HF measures are contraindicated?  ? Request that the discharging provider document the medication/intervention and the contraindication specifically in a progress note  ? For example: “no CHF meds due to hypotension” is not enough. It needs to say: “No ACE-I, ARNI, ARB due to hypotension”; “No Beta Blockade due to bradycardia”…

## 2021-05-17 NOTE — PROGRESS NOTES
Received bedside report assumed care of pt. Pt resting comfortably in bed. Bedside table, and call light within reach. Bed alarm on and in lowest position. Pt denying pain at this time. VS stable. Updated whiteboard in room and discussed today's POC. Pt's wife and father at bedside. All questions answered.

## 2021-05-18 ENCOUNTER — APPOINTMENT (OUTPATIENT)
Dept: RADIOLOGY | Facility: MEDICAL CENTER | Age: 51
DRG: 224 | End: 2021-05-18
Attending: INTERNAL MEDICINE
Payer: COMMERCIAL

## 2021-05-18 ENCOUNTER — APPOINTMENT (OUTPATIENT)
Dept: CARDIOLOGY | Facility: MEDICAL CENTER | Age: 51
DRG: 224 | End: 2021-05-18
Attending: NURSE PRACTITIONER
Payer: COMMERCIAL

## 2021-05-18 LAB
ALBUMIN SERPL BCP-MCNC: 3 G/DL (ref 3.2–4.9)
ALBUMIN/GLOB SERPL: 1.2 G/DL
ALP SERPL-CCNC: 81 U/L (ref 30–99)
ALT SERPL-CCNC: 35 U/L (ref 2–50)
AMPHET UR QL SCN: NEGATIVE
ANION GAP SERPL CALC-SCNC: 9 MMOL/L (ref 7–16)
AST SERPL-CCNC: 29 U/L (ref 12–45)
BARBITURATES UR QL SCN: NEGATIVE
BASOPHILS # BLD AUTO: 0.5 % (ref 0–1.8)
BASOPHILS # BLD: 0.05 K/UL (ref 0–0.12)
BENZODIAZ UR QL SCN: POSITIVE
BILIRUB SERPL-MCNC: 0.6 MG/DL (ref 0.1–1.5)
BUN SERPL-MCNC: 11 MG/DL (ref 8–22)
BZE UR QL SCN: NEGATIVE
CALCIUM SERPL-MCNC: 8.1 MG/DL (ref 8.5–10.5)
CANNABINOIDS UR QL SCN: NEGATIVE
CHLORIDE SERPL-SCNC: 95 MMOL/L (ref 96–112)
CO2 SERPL-SCNC: 30 MMOL/L (ref 20–33)
CREAT SERPL-MCNC: 0.83 MG/DL (ref 0.5–1.4)
EKG IMPRESSION: NORMAL
EOSINOPHIL # BLD AUTO: 0.32 K/UL (ref 0–0.51)
EOSINOPHIL NFR BLD: 3.4 % (ref 0–6.9)
ERYTHROCYTE [DISTWIDTH] IN BLOOD BY AUTOMATED COUNT: 51.3 FL (ref 35.9–50)
GLOBULIN SER CALC-MCNC: 2.5 G/DL (ref 1.9–3.5)
GLUCOSE SERPL-MCNC: 109 MG/DL (ref 65–99)
HCT VFR BLD AUTO: 45 % (ref 42–52)
HGB BLD-MCNC: 15.2 G/DL (ref 14–18)
IMM GRANULOCYTES # BLD AUTO: 0.03 K/UL (ref 0–0.11)
IMM GRANULOCYTES NFR BLD AUTO: 0.3 % (ref 0–0.9)
LYMPHOCYTES # BLD AUTO: 0.57 K/UL (ref 1–4.8)
LYMPHOCYTES NFR BLD: 6 % (ref 22–41)
MAGNESIUM SERPL-MCNC: 2.1 MG/DL (ref 1.5–2.5)
MCH RBC QN AUTO: 34.2 PG (ref 27–33)
MCHC RBC AUTO-ENTMCNC: 33.8 G/DL (ref 33.7–35.3)
MCV RBC AUTO: 101.1 FL (ref 81.4–97.8)
METHADONE UR QL SCN: NEGATIVE
MONOCYTES # BLD AUTO: 0.94 K/UL (ref 0–0.85)
MONOCYTES NFR BLD AUTO: 9.9 % (ref 0–13.4)
NEUTROPHILS # BLD AUTO: 7.59 K/UL (ref 1.82–7.42)
NEUTROPHILS NFR BLD: 79.9 % (ref 44–72)
NRBC # BLD AUTO: 0 K/UL
NRBC BLD-RTO: 0 /100 WBC
NT-PROBNP SERPL IA-MCNC: 910 PG/ML (ref 0–125)
OPIATES UR QL SCN: NEGATIVE
OXYCODONE UR QL SCN: NEGATIVE
PCP UR QL SCN: NEGATIVE
PHOSPHATE SERPL-MCNC: 3.8 MG/DL (ref 2.5–4.5)
PLATELET # BLD AUTO: 145 K/UL (ref 164–446)
PMV BLD AUTO: 10.5 FL (ref 9–12.9)
POTASSIUM SERPL-SCNC: 3.9 MMOL/L (ref 3.6–5.5)
PROPOXYPH UR QL SCN: NEGATIVE
PROT SERPL-MCNC: 5.5 G/DL (ref 6–8.2)
RBC # BLD AUTO: 4.45 M/UL (ref 4.7–6.1)
SODIUM SERPL-SCNC: 134 MMOL/L (ref 135–145)
WBC # BLD AUTO: 9.5 K/UL (ref 4.8–10.8)

## 2021-05-18 PROCEDURE — 700102 HCHG RX REV CODE 250 W/ 637 OVERRIDE(OP): Performed by: HOSPITALIST

## 2021-05-18 PROCEDURE — 770001 HCHG ROOM/CARE - MED/SURG/GYN PRIV*

## 2021-05-18 PROCEDURE — 71045 X-RAY EXAM CHEST 1 VIEW: CPT

## 2021-05-18 PROCEDURE — A9270 NON-COVERED ITEM OR SERVICE: HCPCS | Performed by: NURSE PRACTITIONER

## 2021-05-18 PROCEDURE — 85025 COMPLETE CBC W/AUTO DIFF WBC: CPT

## 2021-05-18 PROCEDURE — 02HK3KZ INSERTION OF DEFIBRILLATOR LEAD INTO RIGHT VENTRICLE, PERCUTANEOUS APPROACH: ICD-10-PCS | Performed by: INTERNAL MEDICINE

## 2021-05-18 PROCEDURE — 305387 CL-IMPLANTABLE CARDIOVERTER DEFIBRILLATOR

## 2021-05-18 PROCEDURE — 700111 HCHG RX REV CODE 636 W/ 250 OVERRIDE (IP): Performed by: INTERNAL MEDICINE

## 2021-05-18 PROCEDURE — 99232 SBSQ HOSP IP/OBS MODERATE 35: CPT | Performed by: INTERNAL MEDICINE

## 2021-05-18 PROCEDURE — 84100 ASSAY OF PHOSPHORUS: CPT

## 2021-05-18 PROCEDURE — 700111 HCHG RX REV CODE 636 W/ 250 OVERRIDE (IP)

## 2021-05-18 PROCEDURE — 700105 HCHG RX REV CODE 258: Performed by: HOSPITALIST

## 2021-05-18 PROCEDURE — 83735 ASSAY OF MAGNESIUM: CPT

## 2021-05-18 PROCEDURE — 700111 HCHG RX REV CODE 636 W/ 250 OVERRIDE (IP): Performed by: NURSE PRACTITIONER

## 2021-05-18 PROCEDURE — 80307 DRUG TEST PRSMV CHEM ANLYZR: CPT

## 2021-05-18 PROCEDURE — A9270 NON-COVERED ITEM OR SERVICE: HCPCS | Performed by: HOSPITALIST

## 2021-05-18 PROCEDURE — 99223 1ST HOSP IP/OBS HIGH 75: CPT | Mod: 57 | Performed by: INTERNAL MEDICINE

## 2021-05-18 PROCEDURE — 80053 COMPREHEN METABOLIC PANEL: CPT

## 2021-05-18 PROCEDURE — 99233 SBSQ HOSP IP/OBS HIGH 50: CPT | Performed by: HOSPITALIST

## 2021-05-18 PROCEDURE — 02H63KZ INSERTION OF DEFIBRILLATOR LEAD INTO RIGHT ATRIUM, PERCUTANEOUS APPROACH: ICD-10-PCS | Performed by: INTERNAL MEDICINE

## 2021-05-18 PROCEDURE — 700102 HCHG RX REV CODE 250 W/ 637 OVERRIDE(OP): Performed by: NURSE PRACTITIONER

## 2021-05-18 PROCEDURE — 700111 HCHG RX REV CODE 636 W/ 250 OVERRIDE (IP): Performed by: HOSPITALIST

## 2021-05-18 PROCEDURE — A9270 NON-COVERED ITEM OR SERVICE: HCPCS | Performed by: INTERNAL MEDICINE

## 2021-05-18 PROCEDURE — 0JH608Z INSERTION OF DEFIBRILLATOR GENERATOR INTO CHEST SUBCUTANEOUS TISSUE AND FASCIA, OPEN APPROACH: ICD-10-PCS | Performed by: INTERNAL MEDICINE

## 2021-05-18 PROCEDURE — 83880 ASSAY OF NATRIURETIC PEPTIDE: CPT

## 2021-05-18 PROCEDURE — 302307 BAG FECAL MANAGEMENT TEMPORARY COLLECTION WITH FILTER - SEAL SIGNAL FMS: Performed by: HOSPITALIST

## 2021-05-18 PROCEDURE — 700101 HCHG RX REV CODE 250

## 2021-05-18 PROCEDURE — 93010 ELECTROCARDIOGRAM REPORT: CPT | Performed by: INTERNAL MEDICINE

## 2021-05-18 PROCEDURE — 93005 ELECTROCARDIOGRAM TRACING: CPT | Performed by: INTERNAL MEDICINE

## 2021-05-18 PROCEDURE — 700102 HCHG RX REV CODE 250 W/ 637 OVERRIDE(OP): Performed by: INTERNAL MEDICINE

## 2021-05-18 RX ORDER — AMIODARONE HYDROCHLORIDE 200 MG/1
400 TABLET ORAL TWICE DAILY
Status: DISCONTINUED | OUTPATIENT
Start: 2021-05-18 | End: 2021-05-20 | Stop reason: HOSPADM

## 2021-05-18 RX ORDER — ACETAMINOPHEN 325 MG/1
650 TABLET ORAL EVERY 4 HOURS PRN
Status: DISCONTINUED | OUTPATIENT
Start: 2021-05-18 | End: 2021-05-20 | Stop reason: HOSPADM

## 2021-05-18 RX ORDER — CEFAZOLIN SODIUM 2 G/100ML
2 INJECTION, SOLUTION INTRAVENOUS ONCE
Status: COMPLETED | OUTPATIENT
Start: 2021-05-18 | End: 2021-05-18

## 2021-05-18 RX ORDER — TRAMADOL HYDROCHLORIDE 50 MG/1
50 TABLET ORAL EVERY 6 HOURS PRN
Status: DISCONTINUED | OUTPATIENT
Start: 2021-05-18 | End: 2021-05-20 | Stop reason: HOSPADM

## 2021-05-18 RX ORDER — MIDAZOLAM HYDROCHLORIDE 1 MG/ML
INJECTION INTRAMUSCULAR; INTRAVENOUS
Status: COMPLETED
Start: 2021-05-18 | End: 2021-05-18

## 2021-05-18 RX ORDER — CEFAZOLIN SODIUM 1 G/3ML
INJECTION, POWDER, FOR SOLUTION INTRAMUSCULAR; INTRAVENOUS
Status: COMPLETED
Start: 2021-05-18 | End: 2021-05-18

## 2021-05-18 RX ORDER — POTASSIUM CHLORIDE 20 MEQ/1
10 TABLET, EXTENDED RELEASE ORAL DAILY
Status: DISCONTINUED | OUTPATIENT
Start: 2021-05-18 | End: 2021-05-20 | Stop reason: HOSPADM

## 2021-05-18 RX ORDER — FUROSEMIDE 10 MG/ML
20 INJECTION INTRAMUSCULAR; INTRAVENOUS
Status: DISCONTINUED | OUTPATIENT
Start: 2021-05-18 | End: 2021-05-19

## 2021-05-18 RX ORDER — VALSARTAN 80 MG/1
80 TABLET ORAL TWICE DAILY
Status: DISCONTINUED | OUTPATIENT
Start: 2021-05-18 | End: 2021-05-20 | Stop reason: HOSPADM

## 2021-05-18 RX ORDER — LIDOCAINE HYDROCHLORIDE 20 MG/ML
INJECTION, SOLUTION INFILTRATION; PERINEURAL
Status: COMPLETED
Start: 2021-05-18 | End: 2021-05-18

## 2021-05-18 RX ADMIN — CHLORDIAZEPOXIDE HYDROCHLORIDE 25 MG: 25 CAPSULE ORAL at 05:32

## 2021-05-18 RX ADMIN — CHLORDIAZEPOXIDE HYDROCHLORIDE 25 MG: 25 CAPSULE ORAL at 13:51

## 2021-05-18 RX ADMIN — LORAZEPAM 0.5 MG: 0.5 TABLET ORAL at 08:01

## 2021-05-18 RX ADMIN — ENOXAPARIN SODIUM 120 MG: 120 INJECTION SUBCUTANEOUS at 05:32

## 2021-05-18 RX ADMIN — THIAMINE HCL TAB 100 MG 100 MG: 100 TAB at 05:33

## 2021-05-18 RX ADMIN — SPIRONOLACTONE 25 MG: 25 TABLET ORAL at 06:00

## 2021-05-18 RX ADMIN — CEFAZOLIN SODIUM 2 G: 2 INJECTION, SOLUTION INTRAVENOUS at 20:59

## 2021-05-18 RX ADMIN — FENTANYL CITRATE 100 MCG: 50 INJECTION, SOLUTION INTRAMUSCULAR; INTRAVENOUS at 11:42

## 2021-05-18 RX ADMIN — DOXYCYCLINE 100 MG: 100 TABLET, FILM COATED ORAL at 05:32

## 2021-05-18 RX ADMIN — FUROSEMIDE 20 MG: 10 INJECTION, SOLUTION INTRAMUSCULAR; INTRAVENOUS at 09:50

## 2021-05-18 RX ADMIN — DOXYCYCLINE 100 MG: 100 TABLET, FILM COATED ORAL at 17:37

## 2021-05-18 RX ADMIN — CEFAZOLIN 1000 MG: 330 INJECTION, POWDER, FOR SOLUTION INTRAMUSCULAR; INTRAVENOUS at 11:08

## 2021-05-18 RX ADMIN — THERA TABS 1 TABLET: TAB at 05:33

## 2021-05-18 RX ADMIN — CARVEDILOL 6.25 MG: 6.25 TABLET, FILM COATED ORAL at 17:38

## 2021-05-18 RX ADMIN — MIDAZOLAM HYDROCHLORIDE 2 MG: 1 INJECTION, SOLUTION INTRAMUSCULAR; INTRAVENOUS at 11:44

## 2021-05-18 RX ADMIN — VALSARTAN 40 MG: 80 TABLET, FILM COATED ORAL at 05:33

## 2021-05-18 RX ADMIN — LORAZEPAM 0.5 MG: 0.5 TABLET ORAL at 13:51

## 2021-05-18 RX ADMIN — AMPICILLIN AND SULBACTAM 3 G: 2; 1 INJECTION, POWDER, FOR SOLUTION INTRAVENOUS at 05:27

## 2021-05-18 RX ADMIN — Medication 5 MG: at 22:04

## 2021-05-18 RX ADMIN — POTASSIUM CHLORIDE 10 MEQ: 1500 TABLET, EXTENDED RELEASE ORAL at 09:51

## 2021-05-18 RX ADMIN — CARVEDILOL 6.25 MG: 6.25 TABLET, FILM COATED ORAL at 08:01

## 2021-05-18 RX ADMIN — AMIODARONE HYDROCHLORIDE 400 MG: 200 TABLET ORAL at 17:37

## 2021-05-18 RX ADMIN — AMPICILLIN AND SULBACTAM 3 G: 2; 1 INJECTION, POWDER, FOR SOLUTION INTRAVENOUS at 00:26

## 2021-05-18 RX ADMIN — VALSARTAN 80 MG: 80 TABLET, FILM COATED ORAL at 17:37

## 2021-05-18 RX ADMIN — DEXTROSE MONOHYDRATE: 50 INJECTION, SOLUTION INTRAVENOUS at 08:49

## 2021-05-18 RX ADMIN — MIDAZOLAM HYDROCHLORIDE 1 MG: 1 INJECTION, SOLUTION INTRAMUSCULAR; INTRAVENOUS at 11:56

## 2021-05-18 RX ADMIN — CHLORDIAZEPOXIDE HYDROCHLORIDE 25 MG: 25 CAPSULE ORAL at 22:04

## 2021-05-18 RX ADMIN — DOCUSATE SODIUM 50 MG AND SENNOSIDES 8.6 MG 2 TABLET: 8.6; 5 TABLET, FILM COATED ORAL at 17:38

## 2021-05-18 RX ADMIN — AMIODARONE HYDROCHLORIDE 400 MG: 200 TABLET ORAL at 09:50

## 2021-05-18 RX ADMIN — FOLIC ACID 1 MG: 1 TABLET ORAL at 05:33

## 2021-05-18 RX ADMIN — AMIODARONE HYDROCHLORIDE 0.5 MG/MIN: 1.8 INJECTION, SOLUTION INTRAVENOUS at 08:49

## 2021-05-18 RX ADMIN — LORAZEPAM 1 MG: 1 TABLET ORAL at 01:16

## 2021-05-18 RX ADMIN — LIDOCAINE HYDROCHLORIDE: 20 INJECTION, SOLUTION INFILTRATION; PERINEURAL at 11:08

## 2021-05-18 RX ADMIN — MIDAZOLAM HYDROCHLORIDE 2 MG: 1 INJECTION, SOLUTION INTRAMUSCULAR; INTRAVENOUS at 11:10

## 2021-05-18 RX ADMIN — CEFAZOLIN 2000 MG: 330 INJECTION, POWDER, FOR SOLUTION INTRAMUSCULAR; INTRAVENOUS at 11:08

## 2021-05-18 ASSESSMENT — ENCOUNTER SYMPTOMS
BACK PAIN: 0
NAUSEA: 0
ABDOMINAL DISTENTION: 0
HEADACHES: 0
TROUBLE SWALLOWING: 0
TREMORS: 1
AGITATION: 0
DOUBLE VISION: 0
MYALGIAS: 0
ABDOMINAL PAIN: 0
BRUISES/BLEEDS EASILY: 0
CHILLS: 0
SORE THROAT: 0
DIZZINESS: 0
BLURRED VISION: 0
FATIGUE: 1
NERVOUS/ANXIOUS: 0
NERVOUS/ANXIOUS: 1
CONFUSION: 0
VOMITING: 0
LOSS OF CONSCIOUSNESS: 0
COUGH: 0
SHORTNESS OF BREATH: 0
FEVER: 0
COLOR CHANGE: 0
CHEST TIGHTNESS: 0
PALPITATIONS: 0
BLOOD IN STOOL: 0
DIAPHORESIS: 0
NUMBNESS: 0
DIARRHEA: 0
SHORTNESS OF BREATH: 1

## 2021-05-18 ASSESSMENT — LIFESTYLE VARIABLES
VISUAL DISTURBANCES: NOT PRESENT
AUDITORY DISTURBANCES: NOT PRESENT
TREMOR: TREMOR NOT VISIBLE BUT CAN BE FELT, FINGERTIP TO FINGERTIP
TREMOR: NO TREMOR
AUDITORY DISTURBANCES: NOT PRESENT
NAUSEA AND VOMITING: NO NAUSEA AND NO VOMITING
ORIENTATION AND CLOUDING OF SENSORIUM: ORIENTED AND CAN DO SERIAL ADDITIONS
ANXIETY: MILDLY ANXIOUS
PAROXYSMAL SWEATS: NO SWEAT VISIBLE
VISUAL DISTURBANCES: MILD SENSITIVITY
HEADACHE, FULLNESS IN HEAD: NOT PRESENT
TREMOR: TREMOR NOT VISIBLE BUT CAN BE FELT, FINGERTIP TO FINGERTIP
ORIENTATION AND CLOUDING OF SENSORIUM: ORIENTED AND CAN DO SERIAL ADDITIONS
ANXIETY: MILDLY ANXIOUS
TOTAL SCORE: 8
TOTAL SCORE: 1
PAROXYSMAL SWEATS: BARELY PERCEPTIBLE SWEATING. PALMS MOIST
HEADACHE, FULLNESS IN HEAD: NOT PRESENT
AGITATION: NORMAL ACTIVITY
ANXIETY: NO ANXIETY (AT EASE)
HEADACHE, FULLNESS IN HEAD: NOT PRESENT
VISUAL DISTURBANCES: NOT PRESENT
TREMOR: NO TREMOR
TOTAL SCORE: 6
AGITATION: *
TOTAL SCORE: 2
AUDITORY DISTURBANCES: VERY MILD HARSHNESS OR ABILITY TO FRIGHTEN
HEADACHE, FULLNESS IN HEAD: NOT PRESENT
HEADACHE, FULLNESS IN HEAD: NOT PRESENT
NAUSEA AND VOMITING: NO NAUSEA AND NO VOMITING
ANXIETY: *
ORIENTATION AND CLOUDING OF SENSORIUM: ORIENTED AND CAN DO SERIAL ADDITIONS
AUDITORY DISTURBANCES: VERY MILD HARSHNESS OR ABILITY TO FRIGHTEN
AGITATION: SOMEWHAT MORE THAN NORMAL ACTIVITY
AGITATION: *
TOTAL SCORE: 6
TREMOR: TREMOR NOT VISIBLE BUT CAN BE FELT, FINGERTIP TO FINGERTIP
ANXIETY: MILDLY ANXIOUS
PAROXYSMAL SWEATS: NO SWEAT VISIBLE
NAUSEA AND VOMITING: NO NAUSEA AND NO VOMITING
PAROXYSMAL SWEATS: BARELY PERCEPTIBLE SWEATING. PALMS MOIST
VISUAL DISTURBANCES: NOT PRESENT
NAUSEA AND VOMITING: NO NAUSEA AND NO VOMITING
ORIENTATION AND CLOUDING OF SENSORIUM: ORIENTED AND CAN DO SERIAL ADDITIONS
VISUAL DISTURBANCES: NOT PRESENT
AUDITORY DISTURBANCES: NOT PRESENT
NAUSEA AND VOMITING: NO NAUSEA AND NO VOMITING
PAROXYSMAL SWEATS: BARELY PERCEPTIBLE SWEATING. PALMS MOIST
ORIENTATION AND CLOUDING OF SENSORIUM: ORIENTED AND CAN DO SERIAL ADDITIONS
AGITATION: NORMAL ACTIVITY

## 2021-05-18 ASSESSMENT — PAIN DESCRIPTION - PAIN TYPE
TYPE: ACUTE PAIN

## 2021-05-18 ASSESSMENT — PATIENT HEALTH QUESTIONNAIRE - PHQ9
1. LITTLE INTEREST OR PLEASURE IN DOING THINGS: NOT AT ALL
SUM OF ALL RESPONSES TO PHQ9 QUESTIONS 1 AND 2: 0
2. FEELING DOWN, DEPRESSED, IRRITABLE, OR HOPELESS: NOT AT ALL

## 2021-05-18 ASSESSMENT — FIBROSIS 4 INDEX: FIB4 SCORE: 1.78

## 2021-05-18 NOTE — PROGRESS NOTES
Cardiology Follow Up Progress Note    Date of Service  5/18/2021    Attending Physician  Jey Huston M.D.    Chief Complaint   Worsening bilateral edema and exertional dyspnea     Cardiology consult   New onset of systolic heart failure     HPI  Ross Barry is a 50 y.o. male admitted 5/16/2021 with bilateral edema and exertional dyspnea.  He was transferred from [facility] with evaluation for new onset systolic heart failure and an episode of ventricular fibrillation. 5/15 CPR was performed.  Patient was pulseless.  He reverted back to normal sinus rhythm with successful defibrillation.  Coronary angiogram 5/17 showed nonobstructive coronary artery disease.    History of alcohol abuse     Interim Events  Patient laying in bed, notable tremors. Denies any chest pain, sob, dizziness or palpitations. NSR on the monitor.     Right radial is soft and good peripheral pulse   K 3.9  Na 134    Review of Systems  Review of Systems   Constitutional: Positive for fatigue. Negative for chills, diaphoresis and fever.   HENT: Negative for nosebleeds and trouble swallowing.    Respiratory: Negative for cough, chest tightness and shortness of breath.    Cardiovascular: Positive for leg swelling. Negative for chest pain and palpitations.   Gastrointestinal: Negative for abdominal distention, abdominal pain and blood in stool.   Genitourinary: Negative for hematuria.   Skin: Negative for color change.   Neurological: Negative for dizziness, syncope and numbness.   Psychiatric/Behavioral: Negative for agitation and confusion. The patient is not nervous/anxious.        Vital signs in last 24 hours  Temp:  [35.8 °C (96.5 °F)-36.1 °C (97 °F)] 36.1 °C (97 °F)  Pulse:  [] 97  Resp:  [18-30] 23  BP: ()/(36-98) 110/70  SpO2:  [83 %-100 %] 96 %    Physical Exam  Physical Exam  Vitals and nursing note reviewed.   Constitutional:       Appearance: Normal appearance. He is ill-appearing.   HENT:      Head: Normocephalic  and atraumatic.   Eyes:      Pupils: Pupils are equal, round, and reactive to light.   Cardiovascular:      Rate and Rhythm: Normal rate and regular rhythm.      Heart sounds: Normal heart sounds. No murmur heard.     Pulmonary:      Effort: Pulmonary effort is normal.      Breath sounds: Normal breath sounds.   Abdominal:      General: Abdomen is flat.   Musculoskeletal:      Cervical back: Normal range of motion.      Right lower le+ Pitting Edema present.      Left lower le+ Pitting Edema present.   Skin:     General: Skin is warm and dry.   Neurological:      General: No focal deficit present.      Mental Status: He is alert and oriented to person, place, and time.      Motor: Tremor present.   Psychiatric:         Mood and Affect: Mood normal.         Behavior: Behavior normal.         Thought Content: Thought content normal.         Judgment: Judgment normal.         Lab Review  Lab Results   Component Value Date/Time    WBC 9.5 2021 05:30 AM    RBC 4.45 (L) 2021 05:30 AM    HEMOGLOBIN 15.2 2021 05:30 AM    HEMATOCRIT 45.0 2021 05:30 AM    .1 (H) 2021 05:30 AM    MCH 34.2 (H) 2021 05:30 AM    MCHC 33.8 2021 05:30 AM    MPV 10.5 2021 05:30 AM      Lab Results   Component Value Date/Time    SODIUM 134 (L) 2021 05:30 AM    POTASSIUM 3.9 2021 05:30 AM    CHLORIDE 95 (L) 2021 05:30 AM    CO2 30 2021 05:30 AM    GLUCOSE 109 (H) 2021 05:30 AM    BUN 11 2021 05:30 AM    CREATININE 0.83 2021 05:30 AM      Lab Results   Component Value Date/Time    ASTSGOT 29 2021 05:30 AM    ALTSGPT 35 2021 05:30 AM     Lab Results   Component Value Date/Time    TROPONINT 26 (H) 05/15/2021 06:00 AM       Recent Labs     21  0530   NTPROBNP 910*       Cardiac Imaging and Procedures Review  EKG:  NSR     Echocardiogram:  21  CONCLUSIONS  Normal left ventricular chamber size. Moderately reduced left   ventricular  systolic function. Left ventricular ejection fraction is   visually estimated to be 35%.  Normal right ventricular size and systolic function.  Mild to moderate mitral regurgitation.  Estimated right ventricular systolic pressure is 45 mmHg; mild   pulmonary hypertension.  Right atrial pressure is estimated to be 15 mmHg.  No pericardial effusion seen.      Cardiac Catheterization:   5/17/2021  AORTIC VALVE:  There is no significant gradient noted.     LEFT VENTRICULOGRAM:  A 10 mL of contrast were delivered for 3 seconds.    Ejection fraction was estimated to be 25%.  There was global hypokinesis noted   at angiogram.     LEFT MAIN CORONARY ARTERY:  Left main coronary artery is a long, large-caliber   vessel free of disease.     LEFT ANTERIOR DESCENDING ARTERY:  Left anterior descending artery is a long   vessel, which wraps around the apex.  It originates as a very large caliber   vessel and tapers to small caliber at the apex.  There are moderate to small   caliber diagonal branches noted.     RAMUS INTERMEDIUS:  Ramus intermedius is a large bifurcating vessel free of   disease.     LEFT CIRCUMFLEX ARTERY:  Left circumflex artery is a nondominant moderate   caliber vessel with small obtuse marginal branches.  Left circumflex artery   and its branches are free of disease.     RIGHT CORONARY ARTERY:  Right coronary artery is a dominant large caliber   vessel with long large-caliber posterior descending artery, which wraps the   apex as well as a moderate to large caliber posterolateral branch.  Right   coronary artery and its branches are free of disease.     IMPRESSION:  1.  No angiographic evidence of coronary artery disease.  2.  Reduced left ventricular systolic function with ejection fraction 25%.  3.  Elevated left ventricular end-diastolic pressure.      Assessment/Plan  No new Assessment & Plan notes have been filed under this hospital service since the last note was generated.  Service: Cardiology    1. VF  Cardiac Arrest:  - Transfer to [facility] for VF cardiac arrest S/P defibrillation x 2.   - coronary angiogram showed nonobstructive CAD   - EP following along, consideration for secondary prevention ICD   -  IV Amiodarone was stopped and transitioned to oral amiodarone (loaded 2.1K)  - Remains in Sinus at this time.   - K 3.9 and Mag 2.1     2. Acute congestive Heart Failure:  - ECHO showed ef 35%  - Etiology unclear at this time, achycardia mediated vs alcohol mediated cardiomyopathy.   - continue GDMT, coreg 6.25mg BID, aldactone 25mg qd, and valsartan 80mg BID  - diuretic furosemide 20mg IV qd with potassium      3. Paroxysmal atrial fibrillation:  - in NSR currently   - continue lovenox weight based, transition to NOAC after AICD      4. Alcohol abuse;  - had long discussion counseling     Thank you for allowing me to participate in the care of this patient.  I will continue to follow this patient    Please contact me with any questions.    JA Matute   [facility]

## 2021-05-18 NOTE — PROGRESS NOTES
"Hospital Medicine Daily Progress Note    Date of Service  5/18/2021    Chief Complaint  50 y.o. male admitted 5/16/2021 with VFib arrest    Hospital Course  51yo sent from [facility].  Presented there on 5/14 with AFib RVR,  acute systolic HF and ETOH withdrawal.  ON 5/16 convertyed into VFib, cardioverted and did not require intubation.  Started on Amio.  [facility] on 5/17 neg for significant CAD, LVEF 25%    ICD placed 5/18    Interval Problem Update  Pt quite anxious and nervous today about ICD placement.  Has multiple questions in this regard.  Otherwise feels \"shaky\"  AFebrile  Sinus   -110s  4 C    Consultants/Specialty  Cardiology    Code Status  Full Code    Disposition  OK to Tele post ICD placement    Review of Systems  Review of Systems   Constitutional: Negative for chills and fever.   HENT: Negative for nosebleeds and sore throat.    Eyes: Negative for blurred vision and double vision.   Respiratory: Negative for cough and shortness of breath.    Cardiovascular: Negative for chest pain, palpitations and leg swelling.   Gastrointestinal: Negative for abdominal pain, diarrhea, nausea and vomiting.   Genitourinary: Negative for dysuria and urgency.   Musculoskeletal: Negative for back pain.   Skin: Negative for rash.   Neurological: Positive for tremors. Negative for dizziness, loss of consciousness and headaches.   Psychiatric/Behavioral: The patient is nervous/anxious.         Physical Exam  Temp:  [35.8 °C (96.5 °F)-36.1 °C (97 °F)] 36.1 °C (97 °F)  Pulse:  [] 187  Resp:  [18-30] 22  BP: ()/(36-98) 86/36  SpO2:  [83 %-100 %] 95 %    Physical Exam  Vitals reviewed.   Constitutional:       General: He is not in acute distress.     Appearance: Normal appearance. He is well-developed. He is not diaphoretic.   HENT:      Head: Normocephalic and atraumatic.   Eyes:      Conjunctiva/sclera: Conjunctivae normal.   Neck:      Vascular: No JVD.   Cardiovascular:      Rate and Rhythm: Normal rate.    "   Heart sounds: No murmur heard.   No gallop.    Pulmonary:      Effort: Pulmonary effort is normal. No respiratory distress.      Breath sounds: No stridor. No wheezing or rales.   Abdominal:      Palpations: Abdomen is soft.      Tenderness: There is no abdominal tenderness. There is no guarding or rebound.   Musculoskeletal:         General: No tenderness.      Right lower leg: No edema.      Left lower leg: No edema.   Skin:     General: Skin is warm and dry.      Findings: No rash.   Neurological:      Mental Status: He is alert and oriented to person, place, and time. Mental status is at baseline.   Psychiatric:         Mood and Affect: Mood is anxious.         Thought Content: Thought content normal.         Fluids    Intake/Output Summary (Last 24 hours) at 5/18/2021 0613  Last data filed at 5/18/2021 0400  Gross per 24 hour   Intake 2218 ml   Output 250 ml   Net 1968 ml       Laboratory  Recent Labs     05/16/21  1110 05/17/21  0320 05/18/21  0530   WBC 10.0 7.5 9.5   RBC 5.25 4.13* 4.45*   HEMOGLOBIN 17.6 14.1 15.2   HEMATOCRIT 53.4* 41.6* 45.0   .7* 100.7* 101.1*   MCH 33.5* 34.1* 34.2*   MCHC 33.0* 33.9 33.8   RDW 51.2* 50.5* 51.3*   PLATELETCT 155* 142* 145*   MPV 10.2 10.6 10.5     Recent Labs     05/16/21  0405 05/16/21  1110 05/17/21  0320   SODIUM 139 139 139   POTASSIUM 4.1 4.3 3.3*   CHLORIDE 96 95* 100   CO2 31 29 28   GLUCOSE 97 108* 107*   BUN 10 13 13   CREATININE 0.78 0.99 0.82   CALCIUM 8.2* 8.8 7.2*     Recent Labs     05/16/21  1110 05/17/21  0320   APTT  --  37.9*   INR 1.18* 1.27*               Imaging  DX-CHEST-PORTABLE (1 VIEW)   Final Result      1.  Interval improvement in bilateral pulmonary opacities. Possible trace left effusion.   2.  No new consolidation.   3.  Interval placement of ICD/pacer. No pneumothorax.      CL-LEFT HEART CATHETERIZATION WITH POSSIBLE INTERVENTION    (Results Pending)   CL-IMPLANTABLE CARDIOVERTER DEFIBRILLATOR    (Results Pending)         Assessment/Plan  Electrolyte imbalance  Assessment & Plan  K and Mg corrected  Cont to follow    MANSOOR (obstructive sleep apnea)- (present on admission)  Assessment & Plan  Noted by pulmonary, will require sleep study outpatient  Nocturnal oxygen for now    Ventricular fibrillation (HCC)- (present on admission)  Assessment & Plan  Occurred on 5/16/2021 in ICU at [facility]  Seen by cardiology at [facility] and cardioverted  Cont po amio load  Also on carvedilol 6.25  Given severe dilated cardiomyopathy ICD placed 5/18  Cont to follow Tele, e-      Alcohol withdrawal (HCC)- (present on admission)  Assessment & Plan  CIWA protocol  Thiamine, folate, MVI  D/w pt and wife he will probably die in the next few years if he cont's to drink as he does.  Wife has been trying to help him quit.  Pt appears motivated at this point.  Asked [facility] to meet with him to go over Rehab options    Acute respiratory failure with hypoxia (HCC)- (present on admission)  Assessment & Plan  Likely due to pulmonary edema from heart failure  Oxygen protocol  Improved, ongoing medical treatment    A-fib (HCC)- (present on admission)  Assessment & Plan  PAFib  amio  Carvedilol  Now back in sinus  Plan DC on DOAC post ICD implantation    Acute systolic heart failure (HCC)- (present on admission)  Assessment & Plan  With concurrent pulmonary edema, EF 35%   Cardiology consulting  Heart failure protocol  GDMT, coronary angiogram to evaluate for ischemic component versus alcoholic    Obesity (BMI 30-39.9)- (present on admission)  Assessment & Plan  BMI is 39.16       VTE prophylaxis: SCDs

## 2021-05-18 NOTE — DISCHARGE PLANNING
Anticipated Discharge Disposition: Home    Action: Discussed discharge plan with MD; anticipate discharge on 5/19/21. MD reports pt spouse requesting ETOH cessation resources for pt.    CM spoke with pt and spouse at bedside. Pt lives with spouse and 3 children (ages 6/15/17) in a single story home. Pt's spouse reports pt is independent with all ADLs and IADLs and uses no DME. Pt has a PCP (Dr. Ean Macario) and uses [facility] on [address]      CM provided list of Substance abuse resources to wife.     Barriers to Discharge: Medical clearance  AICD placement    Plan: Care coordination will follow and assist with any discharge needs.     Care Transition Team Assessment    Information Source  Orientation Level: Oriented X4  Information Given By: Spouse  Informant's Name: Estefany  Who is responsible for making decisions for patient? : Patient    Readmission Evaluation  Is this a readmission?: No    Elopement Risk  Legal Hold: No  Ambulatory or Self Mobile in Wheelchair: Yes  Disoriented: No  Psychiatric Symptoms: None  History of Wandering: No  Elopement this Admit: No  Vocalizing Wanting to Leave: No  Displays Behaviors, Body Language Wanting to Leave: No-Not at Risk for Elopement  Elopement Risk: Not at Risk for Elopement    Interdisciplinary Discharge Planning  Primary Care Physician: Ean Macario MD  Lives with - Patient's Self Care Capacity: Spouse, Child Less than 18 Years of Age  Patient or legal guardian wants to designate a caregiver: No  Support Systems: Spouse / Significant Other, Children, Family Member(s), Friends / Neighbors  Housing / Facility: 1 Story House  Able to Return to Previous ADL's: Yes  Mobility Issues: No  Prior Services: None  Patient Prefers to be Discharged to:: Home  Assistance Needed: No  Durable Medical Equipment: Not Applicable    Discharge Preparedness  What is your plan after discharge?: Home with help  What are your discharge supports?: Spouse  Prior Functional Level:  Ambulatory, Drives Self, Independent with Activities of Daily Living, Independent with Medication Management  Difficulity with ADLs: None  Difficulity with IADLs: None    Functional Assesment  Prior Functional Level: Ambulatory, Drives Self, Independent with Activities of Daily Living, Independent with Medication Management    Finances  Financial Barriers to Discharge: No  Prescription Coverage: Yes    Vision / Hearing Impairment  Vision Impairment : No  Hearing Impairment : No         Advance Directive  Advance Directive?: None    Domestic Abuse  Have you ever been the victim of abuse or violence?: No  Physical Abuse or Sexual Abuse: No  Verbal Abuse or Emotional Abuse: No  Possible Abuse/Neglect Reported to:: Not Applicable    Psychological Assessment  History of Substance Abuse: Alcohol    Discharge Risks or Barriers  Discharge risks or barriers?: No    Anticipated Discharge Information  Discharge Disposition: Discharged to home/self care (01)

## 2021-05-18 NOTE — PROCEDURES
DATE OF PROCEDURE:  05/17/2021     REFERRING PHYSICIANS:  1.  Vick Alvarez MD  2.  JA Zhang     PROCEDURES:  1.  Left heart catheterization.  2.  Coronary angiography.  3.  Left ventriculogram.  4.  Monitor conscious sedation.     PREPROCEDURE DIAGNOSES:  1.  Dilated cardiomyopathy with ejection fraction of 35%.  2.  Ventricular fibrillation.     POSTPROCEDURE DIAGNOSES:  1.  No angiographic evidence of coronary artery disease.  2.  Reduced left ventricular systolic function with ejection fraction 25%.  3.  Elevated left ventricular end-diastolic pressure.     INDICATIONS:  The patient is a 50-year-old male who was transferred from [facility] for acute systolic congestive heart failure and ventricular   fibrillation.  He underwent an echocardiogram, which showed reduced ejection   fraction of 35%.  He was scheduled for cardiac catheterization.     DESCRIPTION OF PROCEDURE:  After informed consent was signed by the patient,   the patient was brought to the cardiac catheterization laboratory.  He was   prepped and draped in the usual sterile manner.  The right wrist area was   anesthetized with 2% Xylocaine.  A 6-Divehi sheath was inserted into the right   radial artery using modified Seldinger technique.  The intra-arterial   verapamil and nitroglycerin were given.  IV heparin was given.  A 4-Divehi   pigtail catheter was positioned into the left ventricle.  Left angiography was   performed.  This was exchanged for 4-Divehi JL 3.5 catheter, which was   positioned into the left main coronary artery.  Coronary angiography was   performed.  This was exchanged for a 4-Divehi JR4 catheter, which was   positioned into the right coronary artery.  Coronary angiography was   performed.  The patient tolerated the procedure well.  At the end of   procedure, all catheters and sheaths were removed.  Hemoband was placed on the   right wrist.  He was transferred back to [facility] in stable condition.      HEMODYNAMIC DATA:  Hemodynamic data shows aortic pressures of 120/80 mmHg   with mean of 95 mmHg and the /10 mmHg with LVEDP of 35 mmHg.     AORTIC VALVE:  There is no significant gradient noted.     LEFT VENTRICULOGRAM:  A 10 mL of contrast were delivered for 3 seconds.    Ejection fraction was estimated to be 25%.  There was global hypokinesis noted   at angiogram.     LEFT MAIN CORONARY ARTERY:  Left main coronary artery is a long, large-caliber   vessel free of disease.     LEFT ANTERIOR DESCENDING ARTERY:  Left anterior descending artery is a long   vessel, which wraps around the apex.  It originates as a very large caliber   vessel and tapers to small caliber at the apex.  There are moderate to small   caliber diagonal branches noted.     RAMUS INTERMEDIUS:  Ramus intermedius is a large bifurcating vessel free of   disease.     LEFT CIRCUMFLEX ARTERY:  Left circumflex artery is a nondominant moderate   caliber vessel with small obtuse marginal branches.  Left circumflex artery   and its branches are free of disease.     RIGHT CORONARY ARTERY:  Right coronary artery is a dominant large caliber   vessel with long large-caliber posterior descending artery, which wraps the   apex as well as a moderate to large caliber posterolateral branch.  Right   coronary artery and its branches are free of disease.     IMPRESSION:  1.  No angiographic evidence of coronary artery disease.  2.  Reduced left ventricular systolic function with ejection fraction 25%.  3.  Elevated left ventricular end-diastolic pressure.     RECOMMENDATIONS:  Recommend medical therapy, further plans per EP.    SEDATION TIME: The patient's sedation was managed by myself with continuous   face to face time with the patient for 15 minutes from 15:35 to 15:50.      ______________________________  MD SIMA RADFORD/MELONY    DD:  05/17/2021 16:57  DT:  05/17/2021 18:52    Job#:  [id number]

## 2021-05-18 NOTE — CONSULTS
Electrophysiology Initial Consultation    Date of Service  5/18/2021    Referring Physician  Jey Huston M.D.    Reason for Consultation  Nonischemic cardiomyopathy possible tachycardia induced versus alcohol with recurrent ventricular fibrillation for secondary prevention AICD    History of Presenting Illness  Ross Blake is a 50 y.o. male with a past medical history of varicose vein stripping who presented 5/16/2021 with congestive heart failure, atrial fibrillation with RVR.  While in the hospital being diuresed with normal electrolytes patient had recurrent ventricular fibrillation and  currently on amiodarone.  History of alcoholism.  No illicit drug use.  Quit smoking few months back.  No family history of sudden cardiac death.  Received a dose of Lovenox this morning.  Some alcohol withdrawal.  Cardiac catheterization without significant CAD.  Reduced ejection fraction on echocardiogram.  Currently in sinus rhythm.  He was unaware of atrial fibrillation on admission.  No recent surgeries.  History obtained from the patient, the father, the wife.  Patient is  has 2 children.  No illicit drugs.     Review of Systems  Review of Systems   Constitutional: Positive for fatigue. Negative for chills and fever.   HENT: Negative for congestion.    Respiratory: Positive for shortness of breath. Negative for cough.    Cardiovascular: Negative for chest pain and leg swelling.   Gastrointestinal: Negative for abdominal pain and nausea.   Musculoskeletal: Negative for myalgias.   Skin: Negative for rash.   Neurological: Negative for dizziness and headaches.   Hematological: Does not bruise/bleed easily.       Past Medical History   has no past medical history on file.    Surgical History   has no past surgical history on file.    Family History  family history is not on file.    Social History   reports that he has quit smoking. His smoking use included cigarettes. He has never used smokeless tobacco. He  reports current alcohol use. He reports that he does not use drugs.    Medications  Prior to Admission Medications   Prescriptions Last Dose Informant Patient Reported? Taking?   Ascorbic Acid (VITAMIN C GUMMIE PO) 5/13/2021 at am Patient Yes No   Sig: Take 3-4 Tablets by mouth every day.   NON SPECIFIED 5/11/2021 at prn Patient Yes No   Sig: Take 2 Tablets by mouth 2 times a day as needed (swelling). OTC Diuretic   cephALEXin (KEFLEX) 500 MG Cap 5/13/2021 at pm Patient Yes No   Sig: Take 500 mg by mouth 3 times a day. 10 day course   fluticasone (FLONASE) 50 MCG/ACT nasal spray unknown at Unknown time Patient Yes No   Sig: Administer 1 Spray into affected nostril(S) 2 times a day as needed (allergy).   multivitamin-iron-minerals-folic acid (CENTRUM) chewable tablet 5/10/2021 at Unknown time Patient Yes No   Sig: Chew 2 Tablets every day.   predniSONE (DELTASONE) 20 MG Tab Not Taking at Unknown time Patient No No   Sig: Take 3 tabs at once PO daily x 5 days, then take 2 tabs at once daily x 3 days, then take 1 tab PO daily x 2 days   Patient not taking: Reported on 5/16/2021      Facility-Administered Medications: None       Allergies  No Known Allergies    Vital signs in last 24 hours  Temp:  [35.9 °C (96.7 °F)-36.1 °C (97 °F)] 36.1 °C (97 °F)  Pulse:  [] 97  Resp:  [18-30] 23  BP: ()/(36-98) 110/70  SpO2:  [83 %-100 %] 96 %    Physical Exam  Physical Exam  Vitals reviewed.   Constitutional:       General: He is not in acute distress.     Appearance: He is well-developed. He is not diaphoretic.      Comments: Slightly jittery   Eyes:      Conjunctiva/sclera: Conjunctivae normal.   Neck:      Thyroid: No thyroid mass or thyromegaly.      Vascular: No JVD.      Trachea: No tracheal deviation.   Cardiovascular:      Rate and Rhythm: Normal rate and regular rhythm.      Heart sounds: No murmur heard.     Pulmonary:      Effort: Pulmonary effort is normal. No respiratory distress.      Breath sounds: Normal  breath sounds.   Chest:      Chest wall: No tenderness.   Abdominal:      Palpations: Abdomen is soft.      Tenderness: There is no abdominal tenderness.   Musculoskeletal:         General: Normal range of motion.   Skin:     General: Skin is warm and dry.   Neurological:      Mental Status: He is alert and oriented to person, place, and time.      Motor: No tremor.   Psychiatric:         Behavior: Behavior normal.         Lab Review  Lab Results   Component Value Date/Time    WBC 9.5 05/18/2021 05:30 AM    RBC 4.45 (L) 05/18/2021 05:30 AM    HEMOGLOBIN 15.2 05/18/2021 05:30 AM    HEMATOCRIT 45.0 05/18/2021 05:30 AM    .1 (H) 05/18/2021 05:30 AM    MCH 34.2 (H) 05/18/2021 05:30 AM    MCHC 33.8 05/18/2021 05:30 AM    MPV 10.5 05/18/2021 05:30 AM      Lab Results   Component Value Date/Time    SODIUM 134 (L) 05/18/2021 05:30 AM    POTASSIUM 3.9 05/18/2021 05:30 AM    CHLORIDE 95 (L) 05/18/2021 05:30 AM    CO2 30 05/18/2021 05:30 AM    GLUCOSE 109 (H) 05/18/2021 05:30 AM    BUN 11 05/18/2021 05:30 AM    CREATININE 0.83 05/18/2021 05:30 AM      Lab Results   Component Value Date/Time    ASTSGOT 29 05/18/2021 05:30 AM    ALTSGPT 35 05/18/2021 05:30 AM     Lab Results   Component Value Date/Time    TROPONINT 26 (H) 05/15/2021 06:00 AM       Recent Labs     05/18/21  0530   NTPROBNP 910*       Cardiac Imaging and Procedures Review  EKG:  My personal interpretation of the EKG dated 5/17/2021 is sinus rhythm, right bundle branch block, inferior Q's  but on amiodarone  5/15/2021 atrial fibrillation with above findings.  Rhythm strip showed ventricular fibrillation from 5/15 and 05/16/2021  No obvious preexcitation on any EKGs or rhythm strips.  Echocardiogram: 5/14/2021  CONCLUSIONS  Normal left ventricular chamber size. Moderately reduced left   ventricular systolic function. Left ventricular ejection fraction is   visually estimated to be 35%.  Normal right ventricular size and systolic function.  Mild to  moderate mitral regurgitation.  Estimated right ventricular systolic pressure is 45 mmHg; mild   pulmonary hypertension.  Right atrial pressure is estimated to be 15 mmHg.  No pericardial effusion seen.     No prior study is available for comparison  Cardiac Catheterization: 5/16/2021  POSTPROCEDURE DIAGNOSES:  1.  No angiographic evidence of coronary artery disease.  2.  Reduced left ventricular systolic function with ejection fraction 25%.  3.  Elevated left ventricular end-diastolic pressure.       Imaging  CT of the chest without PE    Assessment/Plan  No new Assessment & Plan notes have been filed under this hospital service since the last note was generated.  Service: Cardiac Electrophysiology  1.  Congestive heart failure possible related to alcoholic cardiomyopathy versus tachycardia induced cardiomyopathy.  Recurrent ventricular fibrillation.  Normal cath.  Currently undergoing mild withdrawal symptoms from alcoholism.  Recommendation for secondary prevention AICD.  Long-term would benefit from possibly an MRI of the heart and genetic testing.  The risk, benefits, and alternatives to ICD placement were discussed in great detail, specific risks mentioned including bleeding, infection, cardiac perforation with possible tamponade requiring pericardiocentesis or open heart surgery.  In addition the possibility of lead dislodgment (2-3%), inappropriate shocks, pneumothorax (3%), hemothorax were discussed. Also mentioned were the possibility of death, stroke, and myocardial infarction. The patient verbalized understanding of these potential complications and wishes to proceed with this procedure.   2.  Alcoholism.  3.  Amiodarone transition to oral.  4.  Anticoagulation.  Transition to NOAC.    Thank you for allowing me to participate in the care of this patient.        Please contact me with any questions.    Ibrahima Torres M.D.   Cardiologist, [facility]  [phone]  [phone]

## 2021-05-18 NOTE — OP REPORT
Electrophysiology Procedure Note  [facility]      Date of procedure: 5/18/2021     Procedure Performed: Placement of AICD, Defibrillation testing, moderate sedation administered by RN and supervised by physician    Indication: Cardiomyopathy, ventricular fibrillation, secondary prevention    Physician(s): ROSALINA Torres MD    Anesthesia: Moderate sedation,  start time 1106, stop time 1200  the moderate sedation document has been reviewed, signed and scanned into media     Specimen(s) Removed: None     Estimated Blood Loss:  30cc    Complications: None    Pre Procedure ECG: Sinus rhythm    Post Procedure ECG: Sinus rhythm    DESCRIPTION OF PROCEDURE: After informed written consent, the patient was brought to the electrophysiology lab in the fasting, unsedated state. The patient was prepped and draped in the usual sterile fashion. The procedure was performed under moderate sedation with local anesthetic. A left infraclavicular incision was made with a scalpel and the pre-pectoral device pocket was created using a combination of blunt dissection and electrocautery. The modified Seldinger technique was used to gain access to the left axillary vein times 2. A peel-away hemostasis sheath was placed in the vein. Under fluoroscopic guidance, the ICD lead was introduced into the heart. The ventricular lead was advanced into the RVOT position the pulled back and advanced to the RV apex. The lead was tested and had satisfactory sensing and pacing parameters. Another peel-away hemostasis sheath was placed in the vein.  The Right atrial lead was placed and tested with good numbers and fixated with the normal mechanism. High output pacing did not produce extracardiac stimulation in either the RA or RV lead.  The leads were sutured to the underlying pectoral muscle with interrupted silk over a silastic suture sleeve. The device pocket was irrigated with antibiotic solution, inspected, and no bleeding was seen. The  leads were connected to the ICD pulse generator and the device was inserted into the pocket. The wound was closed with three layers of absorbable sutures and covered with Steri-Strips. After patient was adequately sedated, DFT's using ULV method were performed.  I personally supervised the administration of moderate sedation by the RN and observed the level of consciousness and physiologic status throughout the procedure.  Following recovery from sedation, the patient was transferred to a monitored bed in good condition.    IMPLANTED DEVICE INFORMATION:    Pulse generator is a Medtronic model RYGE6T0   Serial number [id number]     LEAD INFORMATION:  1. Right atrial lead is a Medtronic model 5076-52, serial number [id number], P wave 1 millivolts, threshold 1.5 volts, pacing impedance 646 ohms.  2. Right ventricular lead is a Medtronic model 2194C47, serial number [id number], R wave 8 millivolts, threshold 0.75 volts , pacing impedance 627 ohms.     DEVICE PROGRAMMING:    Ivan therapy: AAI to DD  Tachy therapy:  Two zone lowest at 210 BPM    DEFIBRILLATION THRESHOLD TESTING:    DFT = 30 Joules  Charge time = 6.1 seconds  Shock impedance = 71 ohms    FLUOROSCOPY TIME:  177 mGy    SUMMARY/CONCLUSIONS:  1. Successful secondary prevention AICD  2. DFT <31 joules    RECOMMENDATIONS:  1. Admit to monitored bed.  2. PA and lateral chest x-ray.  3. Implantable cardioverter defibrillator interrogation prior to hospital discharge.  4. Follow-up in device clinic for wound check and device interrogation.

## 2021-05-18 NOTE — CARE PLAN
Problem: Knowledge Deficit - Standard  Goal: Patient and family/care givers will demonstrate understanding of plan of care, disease process/condition, diagnostic tests and medications  Outcome: Progressing     Problem: Optimal Care for Alcohol Withdrawal  Goal: Optimal Care for the alcohol withdrawal patient  Outcome: Progressing     Problem: Seizure Precautions  Goal: Implementation of seizure precautions  Outcome: Progressing     Problem: Lifestyle Changes  Goal: Patient's ability to identify lifestyle changes and available resources to help reduce recurrence of condition will improve  Outcome: Progressing     Problem: Psychosocial  Goal: Patient's level of anxiety will decrease  Outcome: Progressing  Goal: Spiritual and cultural needs incorporated into hospitalization  Outcome: Progressing     Problem: Risk for Aspiration  Goal: Patient's risk for aspiration will be absent or decrease  Outcome: Progressing     Problem: Skin Integrity  Goal: Skin integrity is maintained or improved  Outcome: Progressing     Problem: Care Map:  Admission Optimal Outcome for the Heart Failure Patient  Goal: Admission:  Optimal Care of the heart failure patient  Outcome: Progressing     Problem: Care Map:  Day 1 Optimal Outcome for the Heart Failure Patient  Goal: Day 1:  Optimal Care of the heart failure patient  Outcome: Progressing     Problem: Care Map:  Day 2 Optimal Outcome for the Heart Failure Patient  Goal: Day 2:  Optimal Care of the heart failure patient  Outcome: Progressing     Problem: Care Map:  Day 3 Optimal Outcome for the Heart Failure Patient  Goal: Day 3:  Optimal Care of the heart failure patient  Outcome: Progressing     Problem: Care Map:  Day Before Discharge Optimal Outcome for the Heart Failure Patient  Goal: Day Before Discharge:  Optimal Care of the heart failure patient  Outcome: Progressing     Problem: Care Map:  Day of Discharge Optimal Outcome for the Heart Failure Patient  Goal: Day of Discharge:   Optimal Care of the heart failure patient  Outcome: Progressing     Problem: Pain - Standard  Goal: Alleviation of pain or a reduction in pain to the patient’s comfort goal  Outcome: Progressing     Problem: Fall Risk  Goal: Patient will remain free from falls  Outcome: Progressing   The patient is Stable - Low risk of patient condition declining or worsening         Progress made toward(s) clinical / shift goals:  Hemodynamically stable    Patient is not progressing towards the following goals:

## 2021-05-19 ENCOUNTER — APPOINTMENT (OUTPATIENT)
Dept: RADIOLOGY | Facility: MEDICAL CENTER | Age: 51
DRG: 224 | End: 2021-05-19
Attending: INTERNAL MEDICINE
Payer: COMMERCIAL

## 2021-05-19 LAB
ALBUMIN SERPL BCP-MCNC: 3 G/DL (ref 3.2–4.9)
ALBUMIN/GLOB SERPL: 1.1 G/DL
ALP SERPL-CCNC: 74 U/L (ref 30–99)
ALT SERPL-CCNC: 26 U/L (ref 2–50)
ANION GAP SERPL CALC-SCNC: 9 MMOL/L (ref 7–16)
AST SERPL-CCNC: 28 U/L (ref 12–45)
BACTERIA BLD CULT: NORMAL
BACTERIA BLD CULT: NORMAL
BASOPHILS # BLD AUTO: 0.5 % (ref 0–1.8)
BASOPHILS # BLD: 0.06 K/UL (ref 0–0.12)
BILIRUB SERPL-MCNC: 0.9 MG/DL (ref 0.1–1.5)
BUN SERPL-MCNC: 13 MG/DL (ref 8–22)
CALCIUM SERPL-MCNC: 8.2 MG/DL (ref 8.5–10.5)
CHLORIDE SERPL-SCNC: 98 MMOL/L (ref 96–112)
CO2 SERPL-SCNC: 28 MMOL/L (ref 20–33)
CREAT SERPL-MCNC: 0.95 MG/DL (ref 0.5–1.4)
EKG IMPRESSION: NORMAL
EOSINOPHIL # BLD AUTO: 0.11 K/UL (ref 0–0.51)
EOSINOPHIL NFR BLD: 0.9 % (ref 0–6.9)
ERYTHROCYTE [DISTWIDTH] IN BLOOD BY AUTOMATED COUNT: 52.5 FL (ref 35.9–50)
GLOBULIN SER CALC-MCNC: 2.7 G/DL (ref 1.9–3.5)
GLUCOSE SERPL-MCNC: 108 MG/DL (ref 65–99)
HCT VFR BLD AUTO: 44.9 % (ref 42–52)
HGB BLD-MCNC: 15.1 G/DL (ref 14–18)
IMM GRANULOCYTES # BLD AUTO: 0.05 K/UL (ref 0–0.11)
IMM GRANULOCYTES NFR BLD AUTO: 0.4 % (ref 0–0.9)
LYMPHOCYTES # BLD AUTO: 0.68 K/UL (ref 1–4.8)
LYMPHOCYTES NFR BLD: 5.3 % (ref 22–41)
MAGNESIUM SERPL-MCNC: 1.9 MG/DL (ref 1.5–2.5)
MCH RBC QN AUTO: 34.4 PG (ref 27–33)
MCHC RBC AUTO-ENTMCNC: 33.6 G/DL (ref 33.7–35.3)
MCV RBC AUTO: 102.3 FL (ref 81.4–97.8)
MONOCYTES # BLD AUTO: 1.45 K/UL (ref 0–0.85)
MONOCYTES NFR BLD AUTO: 11.2 % (ref 0–13.4)
NEUTROPHILS # BLD AUTO: 10.59 K/UL (ref 1.82–7.42)
NEUTROPHILS NFR BLD: 81.7 % (ref 44–72)
NRBC # BLD AUTO: 0 K/UL
NRBC BLD-RTO: 0 /100 WBC
PLATELET # BLD AUTO: 168 K/UL (ref 164–446)
PMV BLD AUTO: 10.8 FL (ref 9–12.9)
POTASSIUM SERPL-SCNC: 4.2 MMOL/L (ref 3.6–5.5)
PROT SERPL-MCNC: 5.7 G/DL (ref 6–8.2)
RBC # BLD AUTO: 4.39 M/UL (ref 4.7–6.1)
SIGNIFICANT IND 70042: NORMAL
SIGNIFICANT IND 70042: NORMAL
SITE SITE: NORMAL
SITE SITE: NORMAL
SODIUM SERPL-SCNC: 135 MMOL/L (ref 135–145)
SOURCE SOURCE: NORMAL
SOURCE SOURCE: NORMAL
WBC # BLD AUTO: 12.9 K/UL (ref 4.8–10.8)

## 2021-05-19 PROCEDURE — 700102 HCHG RX REV CODE 250 W/ 637 OVERRIDE(OP): Performed by: INTERNAL MEDICINE

## 2021-05-19 PROCEDURE — 83735 ASSAY OF MAGNESIUM: CPT

## 2021-05-19 PROCEDURE — 99233 SBSQ HOSP IP/OBS HIGH 50: CPT | Performed by: HOSPITALIST

## 2021-05-19 PROCEDURE — A9270 NON-COVERED ITEM OR SERVICE: HCPCS | Performed by: HOSPITALIST

## 2021-05-19 PROCEDURE — 93005 ELECTROCARDIOGRAM TRACING: CPT | Performed by: INTERNAL MEDICINE

## 2021-05-19 PROCEDURE — 700102 HCHG RX REV CODE 250 W/ 637 OVERRIDE(OP): Performed by: HOSPITALIST

## 2021-05-19 PROCEDURE — 33249 INSJ/RPLCMT DEFIB W/LEAD(S): CPT | Performed by: INTERNAL MEDICINE

## 2021-05-19 PROCEDURE — 85025 COMPLETE CBC W/AUTO DIFF WBC: CPT

## 2021-05-19 PROCEDURE — 80053 COMPREHEN METABOLIC PANEL: CPT

## 2021-05-19 PROCEDURE — 700102 HCHG RX REV CODE 250 W/ 637 OVERRIDE(OP): Performed by: NURSE PRACTITIONER

## 2021-05-19 PROCEDURE — 99152 MOD SED SAME PHYS/QHP 5/>YRS: CPT | Performed by: INTERNAL MEDICINE

## 2021-05-19 PROCEDURE — 770001 HCHG ROOM/CARE - MED/SURG/GYN PRIV*

## 2021-05-19 PROCEDURE — A9270 NON-COVERED ITEM OR SERVICE: HCPCS | Performed by: NURSE PRACTITIONER

## 2021-05-19 PROCEDURE — 93641 EP EVL 1/2CHMB PAC CVDFB TST: CPT | Mod: 26 | Performed by: INTERNAL MEDICINE

## 2021-05-19 PROCEDURE — 700111 HCHG RX REV CODE 636 W/ 250 OVERRIDE (IP): Performed by: NURSE PRACTITIONER

## 2021-05-19 PROCEDURE — 99232 SBSQ HOSP IP/OBS MODERATE 35: CPT | Mod: 24 | Performed by: INTERNAL MEDICINE

## 2021-05-19 PROCEDURE — 93010 ELECTROCARDIOGRAM REPORT: CPT | Performed by: INTERNAL MEDICINE

## 2021-05-19 PROCEDURE — A9270 NON-COVERED ITEM OR SERVICE: HCPCS | Performed by: INTERNAL MEDICINE

## 2021-05-19 PROCEDURE — 71045 X-RAY EXAM CHEST 1 VIEW: CPT

## 2021-05-19 RX ORDER — CARVEDILOL 6.25 MG/1
6.25 TABLET ORAL 2 TIMES DAILY WITH MEALS
Status: DISCONTINUED | OUTPATIENT
Start: 2021-05-19 | End: 2021-05-20 | Stop reason: HOSPADM

## 2021-05-19 RX ORDER — METOPROLOL SUCCINATE 25 MG/1
25 TABLET, EXTENDED RELEASE ORAL 2 TIMES DAILY
Status: DISCONTINUED | OUTPATIENT
Start: 2021-05-19 | End: 2021-05-19

## 2021-05-19 RX ORDER — FUROSEMIDE 10 MG/ML
20 INJECTION INTRAMUSCULAR; INTRAVENOUS
Status: DISCONTINUED | OUTPATIENT
Start: 2021-05-19 | End: 2021-05-20 | Stop reason: HOSPADM

## 2021-05-19 RX ORDER — AMIODARONE HYDROCHLORIDE 200 MG/1
200 TABLET ORAL DAILY
Status: DISCONTINUED | OUTPATIENT
Start: 2021-05-28 | End: 2021-05-20 | Stop reason: HOSPADM

## 2021-05-19 RX ADMIN — DOXYCYCLINE 100 MG: 100 TABLET, FILM COATED ORAL at 18:13

## 2021-05-19 RX ADMIN — FOLIC ACID 1 MG: 1 TABLET ORAL at 05:47

## 2021-05-19 RX ADMIN — DOXYCYCLINE 100 MG: 100 TABLET, FILM COATED ORAL at 05:46

## 2021-05-19 RX ADMIN — POTASSIUM CHLORIDE 10 MEQ: 1500 TABLET, EXTENDED RELEASE ORAL at 05:46

## 2021-05-19 RX ADMIN — APIXABAN 5 MG: 5 TABLET, FILM COATED ORAL at 18:14

## 2021-05-19 RX ADMIN — VALSARTAN 80 MG: 80 TABLET, FILM COATED ORAL at 10:02

## 2021-05-19 RX ADMIN — APIXABAN 5 MG: 5 TABLET, FILM COATED ORAL at 08:37

## 2021-05-19 RX ADMIN — DOCUSATE SODIUM 50 MG AND SENNOSIDES 8.6 MG 2 TABLET: 8.6; 5 TABLET, FILM COATED ORAL at 18:13

## 2021-05-19 RX ADMIN — DOCUSATE SODIUM 50 MG AND SENNOSIDES 8.6 MG 2 TABLET: 8.6; 5 TABLET, FILM COATED ORAL at 05:47

## 2021-05-19 RX ADMIN — FUROSEMIDE 20 MG: 10 INJECTION, SOLUTION INTRAMUSCULAR; INTRAVENOUS at 05:47

## 2021-05-19 RX ADMIN — CHLORDIAZEPOXIDE HYDROCHLORIDE 25 MG: 25 CAPSULE ORAL at 05:46

## 2021-05-19 RX ADMIN — CHLORDIAZEPOXIDE HYDROCHLORIDE 25 MG: 25 CAPSULE ORAL at 16:07

## 2021-05-19 RX ADMIN — CARVEDILOL 6.25 MG: 6.25 TABLET, FILM COATED ORAL at 18:12

## 2021-05-19 RX ADMIN — THIAMINE HCL TAB 100 MG 100 MG: 100 TAB at 05:47

## 2021-05-19 RX ADMIN — Medication 5 MG: at 21:44

## 2021-05-19 RX ADMIN — FUROSEMIDE 20 MG: 10 INJECTION, SOLUTION INTRAMUSCULAR; INTRAVENOUS at 16:07

## 2021-05-19 RX ADMIN — SPIRONOLACTONE 25 MG: 25 TABLET ORAL at 08:14

## 2021-05-19 RX ADMIN — CARVEDILOL 6.25 MG: 6.25 TABLET, FILM COATED ORAL at 08:14

## 2021-05-19 RX ADMIN — THERA TABS 1 TABLET: TAB at 05:47

## 2021-05-19 RX ADMIN — AMIODARONE HYDROCHLORIDE 400 MG: 200 TABLET ORAL at 05:46

## 2021-05-19 RX ADMIN — VALSARTAN 80 MG: 80 TABLET, FILM COATED ORAL at 18:13

## 2021-05-19 RX ADMIN — CHLORDIAZEPOXIDE HYDROCHLORIDE 25 MG: 25 CAPSULE ORAL at 21:44

## 2021-05-19 RX ADMIN — AMIODARONE HYDROCHLORIDE 400 MG: 200 TABLET ORAL at 18:13

## 2021-05-19 ASSESSMENT — PAIN DESCRIPTION - PAIN TYPE
TYPE: ACUTE PAIN

## 2021-05-19 ASSESSMENT — ENCOUNTER SYMPTOMS
CONFUSION: 0
VOMITING: 0
BLURRED VISION: 0
TROUBLE SWALLOWING: 0
TREMORS: 0
HEADACHES: 0
WEAKNESS: 1
DIARRHEA: 0
NERVOUS/ANXIOUS: 0
WHEEZING: 0
NERVOUS/ANXIOUS: 1
LIGHT-HEADEDNESS: 0
AGITATION: 0
PALPITATIONS: 0
NUMBNESS: 0
COLOR CHANGE: 0
NAUSEA: 0
FATIGUE: 1
DIZZINESS: 0
SHORTNESS OF BREATH: 0
DOUBLE VISION: 0
DIAPHORESIS: 0
CHEST TIGHTNESS: 0
WEAKNESS: 0
BACK PAIN: 0
ABDOMINAL PAIN: 0
CHILLS: 0
FEVER: 0
BLOOD IN STOOL: 0
ABDOMINAL DISTENTION: 0
SORE THROAT: 0
LOSS OF CONSCIOUSNESS: 0
COUGH: 0
SPEECH DIFFICULTY: 0

## 2021-05-19 ASSESSMENT — LIFESTYLE VARIABLES
ORIENTATION AND CLOUDING OF SENSORIUM: ORIENTED AND CAN DO SERIAL ADDITIONS
TOTAL SCORE: 2
TREMOR: NO TREMOR
NAUSEA AND VOMITING: NO NAUSEA AND NO VOMITING
ANXIETY: MILDLY ANXIOUS
PAROXYSMAL SWEATS: NO SWEAT VISIBLE
AGITATION: SOMEWHAT MORE THAN NORMAL ACTIVITY
VISUAL DISTURBANCES: NOT PRESENT
HEADACHE, FULLNESS IN HEAD: NOT PRESENT
AUDITORY DISTURBANCES: NOT PRESENT

## 2021-05-19 ASSESSMENT — PATIENT HEALTH QUESTIONNAIRE - PHQ9
SUM OF ALL RESPONSES TO PHQ9 QUESTIONS 1 AND 2: 0
2. FEELING DOWN, DEPRESSED, IRRITABLE, OR HOPELESS: NOT AT ALL
1. LITTLE INTEREST OR PLEASURE IN DOING THINGS: NOT AT ALL

## 2021-05-19 ASSESSMENT — FIBROSIS 4 INDEX: FIB4 SCORE: 1.568929081105472255

## 2021-05-19 NOTE — PROGRESS NOTES
Cardiology Follow Up Progress Note    Date of Service  5/19/2021    Attending Physician  Jey Huston M.D.    Chief Complaint   Worsening bilateral edema and exertional dyspnea     Cardiology consult   New onset of systolic heart failure     HPI  Ross Barry is a 50 y.o. male admitted 5/16/2021 with bilateral edema and exertional dyspnea.  He was transferred from [facility] with evaluation for new onset systolic heart failure and an episode of ventricular fibrillation. 5/15 CPR was performed.  Patient was pulseless.  He reverted back to normal sinus rhythm with successful defibrillation.  Coronary angiogram 5/17 showed nonobstructive coronary artery disease.    History of alcohol abuse     Interim Events  Patient sitting up, denies any chest pain, sob, dizziness or palpitations. Sore at the AICD implant site.     Right radial is soft and good peripheral pulse   K 4.2  Na 135    Review of Systems  Review of Systems   Constitutional: Positive for fatigue. Negative for chills, diaphoresis and fever.   HENT: Negative for nosebleeds and trouble swallowing.    Respiratory: Negative for cough, chest tightness and shortness of breath.    Cardiovascular: Positive for leg swelling. Negative for chest pain and palpitations.   Gastrointestinal: Negative for abdominal distention, abdominal pain and blood in stool.   Genitourinary: Negative for hematuria.   Skin: Negative for color change.   Neurological: Negative for dizziness, syncope and numbness.   Psychiatric/Behavioral: Negative for agitation and confusion. The patient is not nervous/anxious.        Vital signs in last 24 hours  Temp:  [36.3 °C (97.4 °F)-36.6 °C (97.9 °F)] 36.6 °C (97.9 °F)  Pulse:  [] 93  Resp:  [15-34] 28  BP: ()/(43-90) 99/69  SpO2:  [83 %-97 %] 94 %    Physical Exam  Physical Exam  Vitals and nursing note reviewed.   Constitutional:       Appearance: Normal appearance. He is not ill-appearing.   HENT:      Head: Normocephalic and  atraumatic.   Eyes:      Pupils: Pupils are equal, round, and reactive to light.   Neck:      Vascular: JVD present.   Cardiovascular:      Rate and Rhythm: Normal rate and regular rhythm.      Heart sounds: Normal heart sounds. No murmur heard.     Pulmonary:      Effort: Pulmonary effort is normal.      Breath sounds: Rales present.   Abdominal:      General: Abdomen is flat.   Musculoskeletal:      Cervical back: Normal range of motion.      Right lower le+ Pitting Edema present.      Left lower le+ Pitting Edema present.   Skin:     General: Skin is warm and dry.   Neurological:      General: No focal deficit present.      Mental Status: He is alert and oriented to person, place, and time.      Motor: Tremor present.   Psychiatric:         Mood and Affect: Mood normal.         Behavior: Behavior normal.         Thought Content: Thought content normal.         Judgment: Judgment normal.         Lab Review  Lab Results   Component Value Date/Time    WBC 12.9 (H) 2021 04:43 AM    RBC 4.39 (L) 2021 04:43 AM    HEMOGLOBIN 15.1 2021 04:43 AM    HEMATOCRIT 44.9 2021 04:43 AM    .3 (H) 2021 04:43 AM    MCH 34.4 (H) 2021 04:43 AM    MCHC 33.6 (L) 2021 04:43 AM    MPV 10.8 2021 04:43 AM      Lab Results   Component Value Date/Time    SODIUM 135 2021 04:43 AM    POTASSIUM 4.2 2021 04:43 AM    CHLORIDE 98 2021 04:43 AM    CO2 28 2021 04:43 AM    GLUCOSE 108 (H) 2021 04:43 AM    BUN 13 2021 04:43 AM    CREATININE 0.95 2021 04:43 AM      Lab Results   Component Value Date/Time    ASTSGOT 28 2021 04:43 AM    ALTSGPT 26 2021 04:43 AM     Lab Results   Component Value Date/Time    TROPONINT 26 (H) 05/15/2021 06:00 AM       Recent Labs     21  0530   NTPROBNP 910*       Cardiac Imaging and Procedures Review  EKG:  NSR     Echocardiogram:  21  CONCLUSIONS  Normal left ventricular chamber size. Moderately  reduced left   ventricular systolic function. Left ventricular ejection fraction is   visually estimated to be 35%.  Normal right ventricular size and systolic function.  Mild to moderate mitral regurgitation.  Estimated right ventricular systolic pressure is 45 mmHg; mild   pulmonary hypertension.  Right atrial pressure is estimated to be 15 mmHg.  No pericardial effusion seen.      Cardiac Catheterization:   5/17/2021  AORTIC VALVE:  There is no significant gradient noted.     LEFT VENTRICULOGRAM:  A 10 mL of contrast were delivered for 3 seconds.    Ejection fraction was estimated to be 25%.  There was global hypokinesis noted   at angiogram.     LEFT MAIN CORONARY ARTERY:  Left main coronary artery is a long, large-caliber   vessel free of disease.     LEFT ANTERIOR DESCENDING ARTERY:  Left anterior descending artery is a long   vessel, which wraps around the apex.  It originates as a very large caliber   vessel and tapers to small caliber at the apex.  There are moderate to small   caliber diagonal branches noted.     RAMUS INTERMEDIUS:  Ramus intermedius is a large bifurcating vessel free of   disease.     LEFT CIRCUMFLEX ARTERY:  Left circumflex artery is a nondominant moderate   caliber vessel with small obtuse marginal branches.  Left circumflex artery   and its branches are free of disease.     RIGHT CORONARY ARTERY:  Right coronary artery is a dominant large caliber   vessel with long large-caliber posterior descending artery, which wraps the   apex as well as a moderate to large caliber posterolateral branch.  Right   coronary artery and its branches are free of disease.     IMPRESSION:  1.  No angiographic evidence of coronary artery disease.  2.  Reduced left ventricular systolic function with ejection fraction 25%.  3.  Elevated left ventricular end-diastolic pressure.      Assessment/Plan  No new Assessment & Plan notes have been filed under this hospital service since the last note was  generated.  Service: Cardiology    1. VF Cardiac Arrest:  - Transfer to [facility] for VF cardiac arrest S/P defibrillation x 2.   - coronary angiogram showed nonobstructive CAD   - s/p meditronic dual chamber AICD 5/18/2021  - continue amiodarone per EP   - Remains in Sinus at this time.   - outpatient cardiac MRI      2. Acute congestive Heart Failure:  - ECHO showed ef 35%  - Etiology unclear at this time, achycardia mediated vs alcohol mediated cardiomyopathy.   - continue GDMT, coreg 6.25mg BID, aldactone 25mg qd, and valsartan 80mg BID  - continue furosemide 20mg IV BID with potassium    - strict I and O, daily stand up weight     3. Paroxysmal atrial fibrillation:  - in NSR currently   - EP transitioned him from coreg to toprol   - start eliquis 5mg BID      4. Alcohol abuse:  - had long discussion counseling    Future Appointments   Date Time Provider Department Center   5/27/2021  1:45 PM [facility] None   6/24/2021 11:15 AM DANN [facility] None          Thank you for allowing me to participate in the care of this patient.  I will continue to follow this patient    Please contact me with any questions.    JA Matute   [facility]

## 2021-05-19 NOTE — PROGRESS NOTES
[facility] Medicine Daily Progress Note    Date of Service  5/19/2021    Chief Complaint  50 y.o. male admitted 5/16/2021 with VFib arrest    Hospital Course  51yo sent from [facility].  Presented there on 5/14 with AFib RVR,  acute systolic HF and ETOH withdrawal.  ON 5/16 convertyed into VFib, cardioverted and did not require intubation.  Started on Amio.  C on 5/17 neg for significant CAD, LVEF 25%    ICD placed 5/18    Interval Problem Update  Feels better today, stronger and less SOB.  Got up with staff.  Felt a little unsteady but no SOB or dizziness  AFebrile  Sinus   SBP 90-100s  2 LNC  UOP 675ml/24hrs: neg 340ml/24hrs, pos 1.1 litres from admission   On lasix 20mg IV BID    Consultants/Specialty  Cardiology    Code Status  Full Code    Disposition  OK to Tele post ICD placement    Review of Systems  Review of Systems   Constitutional: Negative for chills and fever.   HENT: Negative for nosebleeds and sore throat.    Eyes: Negative for blurred vision and double vision.   Respiratory: Negative for cough and shortness of breath.    Cardiovascular: Negative for chest pain, palpitations and leg swelling.   Gastrointestinal: Negative for abdominal pain, diarrhea, nausea and vomiting.   Genitourinary: Negative for dysuria and urgency.   Musculoskeletal: Negative for back pain.   Skin: Negative for rash.   Neurological: Positive for weakness. Negative for dizziness, tremors, loss of consciousness and headaches.   Psychiatric/Behavioral: The patient is nervous/anxious.         Physical Exam  Temp:  [36.3 °C (97.4 °F)-36.6 °C (97.9 °F)] 36.6 °C (97.9 °F)  Pulse:  [] 89  Resp:  [15-34] 25  BP: ()/(43-90) 98/53  SpO2:  [83 %-97 %] 94 %    Physical Exam  Vitals reviewed.   Constitutional:       General: He is not in acute distress.     Appearance: Normal appearance. He is well-developed. He is not diaphoretic.   HENT:      Head: Normocephalic and atraumatic.   Eyes:      Conjunctiva/sclera: Conjunctivae  normal.   Neck:      Vascular: No JVD.   Cardiovascular:      Rate and Rhythm: Normal rate.      Heart sounds: No murmur heard.   No gallop.    Pulmonary:      Effort: Pulmonary effort is normal. No respiratory distress.      Breath sounds: No stridor. No wheezing or rales.   Abdominal:      Palpations: Abdomen is soft.      Tenderness: There is no abdominal tenderness. There is no guarding or rebound.   Musculoskeletal:         General: No tenderness.      Right lower leg: No edema.      Left lower leg: No edema.   Skin:     General: Skin is warm and dry.      Findings: No rash.   Neurological:      General: No focal deficit present.      Mental Status: He is alert and oriented to person, place, and time. Mental status is at baseline.   Psychiatric:         Mood and Affect: Mood is anxious.         Thought Content: Thought content normal.         Fluids    Intake/Output Summary (Last 24 hours) at 5/19/2021 1243  Last data filed at 5/19/2021 0800  Gross per 24 hour   Intake 100 ml   Output 1125 ml   Net -1025 ml       Laboratory  Recent Labs     05/17/21 0320 05/18/21  0530 05/19/21  0443   WBC 7.5 9.5 12.9*   RBC 4.13* 4.45* 4.39*   HEMOGLOBIN 14.1 15.2 15.1   HEMATOCRIT 41.6* 45.0 44.9   .7* 101.1* 102.3*   MCH 34.1* 34.2* 34.4*   MCHC 33.9 33.8 33.6*   RDW 50.5* 51.3* 52.5*   PLATELETCT 142* 145* 168   MPV 10.6 10.5 10.8     Recent Labs     05/17/21 0320 05/18/21  0530 05/19/21  0443   SODIUM 139 134* 135   POTASSIUM 3.3* 3.9 4.2   CHLORIDE 100 95* 98   CO2 28 30 28   GLUCOSE 107* 109* 108*   BUN 13 11 13   CREATININE 0.82 0.83 0.95   CALCIUM 7.2* 8.1* 8.2*     Recent Labs     05/17/21 0320   APTT 37.9*   INR 1.27*               Imaging  DX-CHEST-PORTABLE (1 VIEW)   Final Result         1.  Pulmonary edema and/or infiltrates are identified, which are stable since the prior exam.   2.  Cardiomegaly      DX-CHEST-PORTABLE (1 VIEW)   Final Result      1.  Interval improvement in bilateral pulmonary  opacities. Possible trace left effusion.   2.  No new consolidation.   3.  Interval placement of ICD/pacer. No pneumothorax.      CL-LEFT HEART CATHETERIZATION WITH POSSIBLE INTERVENTION    (Results Pending)   CL-IMPLANTABLE CARDIOVERTER DEFIBRILLATOR    (Results Pending)        Assessment/Plan  Electrolyte imbalance  Assessment & Plan  K and Mg corrected  Cont to follow    MANSOOR (obstructive sleep apnea)- (present on admission)  Assessment & Plan  Noted by pulmonary, will require sleep study outpatient  Nocturnal oxygen for now    Ventricular fibrillation (HCC)- (present on admission)  Assessment & Plan  Occurred on 5/16/2021 in ICU at [facility]  Seen by cardiology at [facility] and cardioverted  Cont po amio load  Also on carvedilol 6.25: plan DC on metoprolol SR  Given severe dilated cardiomyopathy ICD placed 5/18  Cont to follow Tele, e-      Alcohol withdrawal (HCC)- (present on admission)  Assessment & Plan  CIWA protocol  Thiamine, folate, MVI  D/w pt and wife he will probably die in the next few years if he cont's to drink as he does.  Wife has been trying to help him quit.  Pt appears motivated at this point.    [facility] has met with pt and family to go over outpt resources to assist with his sobriety    Acute respiratory failure with hypoxia (HCC)- (present on admission)  Assessment & Plan  Likely due to pulmonary edema from heart failure  Oxygen protocol  Cont IV lasix    A-fib (HCC)- (present on admission)  Assessment & Plan  PAFib  amio  Carvedilol: transition to metoprolol  Now back in sinus  Start apixaban    Acute systolic heart failure (HCC)- (present on admission)  Assessment & Plan  With concurrent pulmonary edema, EF 35%   Cardiology consulting  Heart failure protocol  [facility] clean  Cont IV lasix cautiously given pressures    Obesity (BMI 30-39.9)- (present on admission)  Assessment & Plan  BMI is 39.16       VTE prophylaxis: SCDs

## 2021-05-19 NOTE — PROGRESS NOTES
Cardiology Follow Up Progress Note    Date of Service  5/19/2021    Attending Physician  Jey Huston M.D.    Chief Complaint/Reason for EP consult:   VF cardiac arrest     HPI  Ross Blake is a 50 y.o. male admitted 5/16/2021 with leg swelling and exertional shortness of breath.  He was admitted to [facility] and found to have reduced left ventricular ejection fraction with left ventricular ejection fraction of 35% on transthoracic echo.  He additionally was found to be in atrial fibrillation with RVR.  He was started on IV diuresis and initially digoxin and betablocker's for rate control with transition to diltiazem for further rate control.  While inpt he started to have evidence of alcohol withdrawal and was treated with CIWA protocol.  On 5/15/21he was noted to have ventricular fibrillation x 2 for which he was successfully defibrillated each time.  He was transferred to [facility] for cardiac care.        Interim Events  Medtronic dual chamber secondary prevention ICD placed 5/18/21.  Denies chest pain, dyspnea this AM.  States feels pretty good.  Mild ICD site tenderness.   Monitored rhythm: Sinus rhythm, rate 99.  No arrhythmia overnight.     MDT Device Interrogation:  RA: p waves 3.0 mV, Impedance 494 ohms, Threshold 0.25 V  RV: r waves 8.1 mV, Impedance 532 ohms, Threshold, 0.5 V, HV Impedance 63 ohms.     Review of Systems  Review of Systems   Constitutional: Positive for fatigue. Negative for chills and fever.   HENT: Negative for trouble swallowing.    Respiratory: Negative for cough, shortness of breath and wheezing.    Cardiovascular: Positive for leg swelling. Negative for chest pain and palpitations.   Gastrointestinal: Negative for abdominal pain, blood in stool, nausea and vomiting.   Musculoskeletal:        MIld ICD site tenderness.    Neurological: Negative for dizziness, syncope, speech difficulty, weakness, light-headedness and numbness.       Vital signs in last 24 hours  Temp:   [36.3 °C (97.4 °F)-36.6 °C (97.9 °F)] 36.6 °C (97.9 °F)  Pulse:  [] 88  Resp:  [15-34] 30  BP: ()/(43-90) 101/57  SpO2:  [91 %-98 %] 97 %    Physical Exam  Physical Exam  Vitals and nursing note reviewed.   Constitutional:       Appearance: Normal appearance.   HENT:      Head: Normocephalic and atraumatic.   Eyes:      Conjunctiva/sclera: Conjunctivae normal.      Pupils: Pupils are equal, round, and reactive to light.   Cardiovascular:      Rate and Rhythm: Normal rate and regular rhythm.      Pulses: Normal pulses.      Heart sounds: Normal heart sounds. No murmur heard.   No friction rub. No gallop.    Pulmonary:      Effort: Pulmonary effort is normal.      Breath sounds: Normal breath sounds. No wheezing, rhonchi or rales.   Abdominal:      General: Bowel sounds are normal.   Musculoskeletal:         General: Normal range of motion.      Cervical back: Normal range of motion.      Right lower leg: Edema (1+) present.      Left lower leg: Edema (1+) present.   Skin:     General: Skin is warm and dry.      Comments: Bilateral lower extremities with dark purplish discoloring     Neurological:      Mental Status: He is alert and oriented to person, place, and time.   Psychiatric:         Mood and Affect: Mood normal.         Behavior: Behavior normal.         Thought Content: Thought content normal.         Judgment: Judgment normal.         Lab Review  Lab Results   Component Value Date/Time    WBC 12.9 (H) 05/19/2021 04:43 AM    RBC 4.39 (L) 05/19/2021 04:43 AM    HEMOGLOBIN 15.1 05/19/2021 04:43 AM    HEMATOCRIT 44.9 05/19/2021 04:43 AM    .3 (H) 05/19/2021 04:43 AM    MCH 34.4 (H) 05/19/2021 04:43 AM    MCHC 33.6 (L) 05/19/2021 04:43 AM    MPV 10.8 05/19/2021 04:43 AM      Lab Results   Component Value Date/Time    SODIUM 135 05/19/2021 04:43 AM    POTASSIUM 4.2 05/19/2021 04:43 AM    CHLORIDE 98 05/19/2021 04:43 AM    CO2 28 05/19/2021 04:43 AM    GLUCOSE 108 (H) 05/19/2021 04:43 AM    BUN  13 05/19/2021 04:43 AM    CREATININE 0.95 05/19/2021 04:43 AM      Lab Results   Component Value Date/Time    ASTSGOT 28 05/19/2021 04:43 AM    ALTSGPT 26 05/19/2021 04:43 AM     Lab Results   Component Value Date/Time    TROPONINT 26 (H) 05/15/2021 06:00 AM         Cardiac Imaging and Procedures Review  Echocardiogram:  5/14/21  CONCLUSIONS  Normal left ventricular chamber size. Moderately reduced left   ventricular systolic function. Left ventricular ejection fraction is   visually estimated to be 35%.  Normal right ventricular size and systolic function.  Mild to moderate mitral regurgitation.  Estimated right ventricular systolic pressure is 45 mmHg; mild   pulmonary hypertension.  Right atrial pressure is estimated to be 15 mmHg.  No pericardial effusion seen.     No prior study is available for comparison.     Cardiac Catheterization:  Pending completion    Imaging  Chest X-Ray:  5/19/21   1.  Pulmonary edema and/or infiltrates are identified, which are stable since the prior exam.  2.  Cardiomegaly    Assessment/Plan  1. VF Cardiac Arrest:  - Transfered to [facility] for VF cardiac arrest S/P defibrillation x 2.   - Cath with no obstructive CAD.   - S/P Medtronic dual chamber secondary prevention ICD placement 5/18/21.  Device is working normally on interrogation this Am.  No evidence of late PTX on CXR.  Pocket without evidence of hematoma.  - Ok to place ICE pack PRN to site for comfort, edema.  Ok to place sandbag if needed for swelling.    - Continue PO Amiodarone loading. Continue BID dosing through 5/27, then decrease to 200 mg daily thereafter.  Dose taper ordered in MAR.    - Remains in Sinus at this time.   - Consider Outpt MRI after device is fully healed.  Consider outpt genetic testing.     2. Paroxysmal Afib:  - Continue amiodarone.  - Transition from Coreg to Toprol.   - Transitioned to Eliquis.     2. Acute congestive Heart Failure/NICM:  - Etiology unclear possibly secondary to tachycardia  mediated vs alcohol mediated.  - Continue GDMT, diuresis per primary cards team.    EP will sign off.  Please call if any questions arise.  Close follow up with device clinic and EP is arranged.       NGUYEN Guerin.   [facility]  [phone]

## 2021-05-20 ENCOUNTER — PATIENT OUTREACH (OUTPATIENT)
Dept: HEALTH INFORMATION MANAGEMENT | Facility: OTHER | Age: 51
End: 2021-05-20

## 2021-05-20 ENCOUNTER — TELEPHONE (OUTPATIENT)
Dept: CARDIOLOGY | Facility: MEDICAL CENTER | Age: 51
End: 2021-05-20

## 2021-05-20 VITALS
SYSTOLIC BLOOD PRESSURE: 95 MMHG | RESPIRATION RATE: 15 BRPM | TEMPERATURE: 97.5 F | BODY MASS INDEX: 36.37 KG/M2 | HEIGHT: 72 IN | OXYGEN SATURATION: 91 % | WEIGHT: 268.52 LBS | DIASTOLIC BLOOD PRESSURE: 58 MMHG | HEART RATE: 91 BPM

## 2021-05-20 LAB
ALBUMIN SERPL BCP-MCNC: 3 G/DL (ref 3.2–4.9)
ALBUMIN/GLOB SERPL: 1.1 G/DL
ALP SERPL-CCNC: 69 U/L (ref 30–99)
ALT SERPL-CCNC: 20 U/L (ref 2–50)
ANION GAP SERPL CALC-SCNC: 9 MMOL/L (ref 7–16)
AST SERPL-CCNC: 15 U/L (ref 12–45)
BASOPHILS # BLD AUTO: 0.4 % (ref 0–1.8)
BASOPHILS # BLD: 0.06 K/UL (ref 0–0.12)
BILIRUB SERPL-MCNC: 0.7 MG/DL (ref 0.1–1.5)
BUN SERPL-MCNC: 22 MG/DL (ref 8–22)
CALCIUM SERPL-MCNC: 8 MG/DL (ref 8.5–10.5)
CHLORIDE SERPL-SCNC: 97 MMOL/L (ref 96–112)
CO2 SERPL-SCNC: 28 MMOL/L (ref 20–33)
CREAT SERPL-MCNC: 1.44 MG/DL (ref 0.5–1.4)
EOSINOPHIL # BLD AUTO: 0.19 K/UL (ref 0–0.51)
EOSINOPHIL NFR BLD: 1.4 % (ref 0–6.9)
ERYTHROCYTE [DISTWIDTH] IN BLOOD BY AUTOMATED COUNT: 51.8 FL (ref 35.9–50)
GLOBULIN SER CALC-MCNC: 2.7 G/DL (ref 1.9–3.5)
GLUCOSE SERPL-MCNC: 120 MG/DL (ref 65–99)
HCT VFR BLD AUTO: 43.7 % (ref 42–52)
HGB BLD-MCNC: 14.7 G/DL (ref 14–18)
IMM GRANULOCYTES # BLD AUTO: 0.08 K/UL (ref 0–0.11)
IMM GRANULOCYTES NFR BLD AUTO: 0.6 % (ref 0–0.9)
LYMPHOCYTES # BLD AUTO: 0.65 K/UL (ref 1–4.8)
LYMPHOCYTES NFR BLD: 4.8 % (ref 22–41)
MCH RBC QN AUTO: 34.1 PG (ref 27–33)
MCHC RBC AUTO-ENTMCNC: 33.6 G/DL (ref 33.7–35.3)
MCV RBC AUTO: 101.4 FL (ref 81.4–97.8)
MONOCYTES # BLD AUTO: 1.69 K/UL (ref 0–0.85)
MONOCYTES NFR BLD AUTO: 12.5 % (ref 0–13.4)
NEUTROPHILS # BLD AUTO: 10.85 K/UL (ref 1.82–7.42)
NEUTROPHILS NFR BLD: 80.3 % (ref 44–72)
NRBC # BLD AUTO: 0 K/UL
NRBC BLD-RTO: 0 /100 WBC
PLATELET # BLD AUTO: 190 K/UL (ref 164–446)
PMV BLD AUTO: 10 FL (ref 9–12.9)
POTASSIUM SERPL-SCNC: 4 MMOL/L (ref 3.6–5.5)
PROT SERPL-MCNC: 5.7 G/DL (ref 6–8.2)
RBC # BLD AUTO: 4.31 M/UL (ref 4.7–6.1)
SODIUM SERPL-SCNC: 134 MMOL/L (ref 135–145)
WBC # BLD AUTO: 13.5 K/UL (ref 4.8–10.8)

## 2021-05-20 PROCEDURE — A9270 NON-COVERED ITEM OR SERVICE: HCPCS | Performed by: HOSPITALIST

## 2021-05-20 PROCEDURE — 700102 HCHG RX REV CODE 250 W/ 637 OVERRIDE(OP): Performed by: NURSE PRACTITIONER

## 2021-05-20 PROCEDURE — 85025 COMPLETE CBC W/AUTO DIFF WBC: CPT

## 2021-05-20 PROCEDURE — 99232 SBSQ HOSP IP/OBS MODERATE 35: CPT | Mod: 24 | Performed by: INTERNAL MEDICINE

## 2021-05-20 PROCEDURE — 80053 COMPREHEN METABOLIC PANEL: CPT

## 2021-05-20 PROCEDURE — A9270 NON-COVERED ITEM OR SERVICE: HCPCS | Performed by: NURSE PRACTITIONER

## 2021-05-20 PROCEDURE — 700111 HCHG RX REV CODE 636 W/ 250 OVERRIDE (IP): Performed by: NURSE PRACTITIONER

## 2021-05-20 PROCEDURE — A9270 NON-COVERED ITEM OR SERVICE: HCPCS | Performed by: INTERNAL MEDICINE

## 2021-05-20 PROCEDURE — 700102 HCHG RX REV CODE 250 W/ 637 OVERRIDE(OP): Performed by: HOSPITALIST

## 2021-05-20 PROCEDURE — 99239 HOSP IP/OBS DSCHRG MGMT >30: CPT | Performed by: HOSPITALIST

## 2021-05-20 PROCEDURE — 97161 PT EVAL LOW COMPLEX 20 MIN: CPT

## 2021-05-20 PROCEDURE — 700102 HCHG RX REV CODE 250 W/ 637 OVERRIDE(OP): Performed by: INTERNAL MEDICINE

## 2021-05-20 RX ORDER — FOLIC ACID 1 MG/1
1 TABLET ORAL DAILY
Qty: 30 TABLET | Refills: 0 | Status: SHIPPED
Start: 2021-05-21 | End: 2021-07-01

## 2021-05-20 RX ORDER — POTASSIUM CHLORIDE 750 MG/1
10 TABLET, EXTENDED RELEASE ORAL DAILY
Qty: 30 TABLET | Refills: 11 | Status: SHIPPED
Start: 2021-05-21 | End: 2021-07-01

## 2021-05-20 RX ORDER — FUROSEMIDE 40 MG/1
40 TABLET ORAL DAILY
Qty: 30 TABLET | Refills: 0 | Status: SHIPPED | OUTPATIENT
Start: 2021-05-20 | End: 2021-06-08 | Stop reason: SDUPTHER

## 2021-05-20 RX ORDER — AMIODARONE HYDROCHLORIDE 400 MG/1
400 TABLET ORAL 2 TIMES DAILY
Qty: 28 TABLET | Refills: 0 | Status: SHIPPED
Start: 2021-05-20 | End: 2021-05-27

## 2021-05-20 RX ORDER — AMIODARONE HYDROCHLORIDE 200 MG/1
200 TABLET ORAL DAILY
Qty: 30 TABLET | Refills: 0 | Status: SHIPPED | OUTPATIENT
Start: 2021-05-28 | End: 2021-06-18 | Stop reason: SDUPTHER

## 2021-05-20 RX ORDER — LANOLIN ALCOHOL/MO/W.PET/CERES
100 CREAM (GRAM) TOPICAL DAILY
Qty: 30 TABLET | Refills: 0 | Status: SHIPPED | OUTPATIENT
Start: 2021-05-21 | End: 2021-09-23

## 2021-05-20 RX ORDER — VALSARTAN 80 MG/1
80 TABLET ORAL DAILY
Qty: 30 TABLET | Refills: 0 | Status: SHIPPED | OUTPATIENT
Start: 2021-05-20 | End: 2021-06-17 | Stop reason: SDUPTHER

## 2021-05-20 RX ORDER — SPIRONOLACTONE 25 MG/1
25 TABLET ORAL DAILY
Qty: 30 TABLET | Refills: 0 | Status: SHIPPED | OUTPATIENT
Start: 2021-05-21 | End: 2021-06-17 | Stop reason: SDUPTHER

## 2021-05-20 RX ORDER — METOPROLOL SUCCINATE 25 MG/1
25 TABLET, EXTENDED RELEASE ORAL DAILY
Qty: 30 TABLET | Refills: 0 | Status: SHIPPED | OUTPATIENT
Start: 2021-05-20 | End: 2021-06-17 | Stop reason: SDUPTHER

## 2021-05-20 RX ADMIN — POTASSIUM CHLORIDE 10 MEQ: 1500 TABLET, EXTENDED RELEASE ORAL at 05:22

## 2021-05-20 RX ADMIN — THIAMINE HCL TAB 100 MG 100 MG: 100 TAB at 05:21

## 2021-05-20 RX ADMIN — FUROSEMIDE 20 MG: 10 INJECTION, SOLUTION INTRAMUSCULAR; INTRAVENOUS at 05:16

## 2021-05-20 RX ADMIN — APIXABAN 5 MG: 5 TABLET, FILM COATED ORAL at 05:23

## 2021-05-20 RX ADMIN — VALSARTAN 80 MG: 80 TABLET, FILM COATED ORAL at 05:22

## 2021-05-20 RX ADMIN — AMIODARONE HYDROCHLORIDE 400 MG: 200 TABLET ORAL at 05:22

## 2021-05-20 RX ADMIN — CHLORDIAZEPOXIDE HYDROCHLORIDE 25 MG: 25 CAPSULE ORAL at 05:21

## 2021-05-20 RX ADMIN — SPIRONOLACTONE 25 MG: 25 TABLET ORAL at 05:22

## 2021-05-20 RX ADMIN — CARVEDILOL 6.25 MG: 6.25 TABLET, FILM COATED ORAL at 07:50

## 2021-05-20 RX ADMIN — THERA TABS 1 TABLET: TAB at 05:22

## 2021-05-20 RX ADMIN — FOLIC ACID 1 MG: 1 TABLET ORAL at 05:23

## 2021-05-20 ASSESSMENT — FIBROSIS 4 INDEX: FIB4 SCORE: 0.85

## 2021-05-20 ASSESSMENT — CHA2DS2 SCORE
CHF OR LEFT VENTRICULAR DYSFUNCTION: YES
PRIOR STROKE OR TIA OR THROMBOEMBOLISM: NO
SEX: MALE
AGE 65 TO 74: NO
AGE 75 OR GREATER: NO
DIABETES: NO
VASCULAR DISEASE: NO
CHA2DS2 VASC SCORE: 1
HYPERTENSION: NO

## 2021-05-20 ASSESSMENT — ENCOUNTER SYMPTOMS
AGITATION: 0
CHEST TIGHTNESS: 0
FEVER: 0
NERVOUS/ANXIOUS: 0
BLOOD IN STOOL: 0
ABDOMINAL PAIN: 0
SHORTNESS OF BREATH: 0
NUMBNESS: 0
CONFUSION: 0
FATIGUE: 1
PALPITATIONS: 0
DIAPHORESIS: 0
COUGH: 0
DIZZINESS: 0
CHILLS: 0
TROUBLE SWALLOWING: 0
ABDOMINAL DISTENTION: 0
COLOR CHANGE: 0

## 2021-05-20 ASSESSMENT — PAIN DESCRIPTION - PAIN TYPE: TYPE: ACUTE PAIN

## 2021-05-20 ASSESSMENT — COGNITIVE AND FUNCTIONAL STATUS - GENERAL
MOBILITY SCORE: 24
SUGGESTED CMS G CODE MODIFIER MOBILITY: CH

## 2021-05-20 ASSESSMENT — GAIT ASSESSMENTS
GAIT LEVEL OF ASSIST: SUPERVISED
DISTANCE (FEET): 40

## 2021-05-20 NOTE — DISCHARGE SUMMARY
Discharge Summary    CHIEF COMPLAINT ON ADMISSION  No chief complaint on file.      Reason for Admission  Acute systolic heart failure (HCC)     Admission Date  5/16/2021    CODE STATUS  Prior    HPI & HOSPITAL COURSE  This is a 50 y.o. male sent from [facility] after V. fib arrest.    This is a 50-year-old gentleman who was previously healthy other than history of alcohol abuse.  He presented to [facility] on May 14 with complaint of worsening leg edema.  At that time he was found to be in atrial fibrillation with rapid ventricular response.  Patient was admitted to the hospital with consultation from cardiology.  At [facility], cardiac echo was done which demonstrated an ejection fraction of 30 to 35%.  Patient was started on digoxin for rate control and eventually diltiazem was added.  During this time as well he underwent diuresis with IV Lasix.  His respiratory status improved with these interventions.  He did require noninvasive positive pressure ventilation on presentation.  On May 16, the patient had 2 episodes of V. fib.  He was electrically cardioverted successfully, and started on amiodarone.  the decision was therefore made to send him to [facility] for evaluation by electrophysiology service    At our facility, the patient went for left heart cath on May 17 with Dr. Braun.  He was found to have no significant cardiovascular disease.  His ejection fraction was found to be 25%.  On May 18, the patient went back to the cardiac Cath Lab for placement of an ICD by Dr. Torres.  A Medtronic model DD PC3 D4 with serial number [id number] was placed.    Post interventions, the patient was continued on IV diuresis which he tolerated well.  In addition he was started on Aldactone, Coreg, and valsartan.  He maintain low blood pressures with systolics in the 90s however he was asymptomatic and did tolerate these medications.  Per electrophysiology service recommendations, he will be  transitioned to metoprolol SR on discharge.  In addition given his atrial fibrillation, and reduced ejection fraction, the recommendation was made to start him on apixaban, which has been done.    During his hospital stay I had opportunity to speak with him at length about his alcohol abuse.  It is very likely the cause of his nonischemic cardiomyopathy.  He appears motivated to quit drinking, and is out that he will succeed.      Therefore, he is discharged in good and stable condition to home with close outpatient follow-up.    The patient met 2-midnight criteria for an inpatient stay at the time of discharge.    Discharge Date  5/20/2021    FOLLOW UP ITEMS POST DISCHARGE  With cardiology, appointment has been scheduled    DISCHARGE DIAGNOSES  Active Problems:    Obesity (BMI 30-39.9) POA: Yes    Acute systolic heart failure (HCC) POA: Yes    A-fib (HCC) POA: Yes    Acute respiratory failure with hypoxia (HCC) POA: Yes    Alcohol withdrawal (HCC) POA: Yes    Ventricular fibrillation (HCC) POA: Yes    MANSOOR (obstructive sleep apnea) POA: Yes    Electrolyte imbalance POA: Unknown  Resolved Problems:    * No resolved hospital problems. *      FOLLOW UP  Future Appointments   Date Time Provider Department Center   5/27/2021  1:00 PM PACER CHECK-CAM B 2 [facility] None   5/27/2021  1:45 PM Tracey Murrell, A.P.R.N. [facility] None   6/24/2021 11:15 AM DANN Guerin.P.R.N. [facility] None     Clare Baez A.P.N.  [address] [facility] [address]  [phone]    Go on 6/2/2021  Please check in at 10:15am for a 10:30am follow up appointment. Per office they will send a link for you to fill out online paperwork prior to appointment. Thank You.      MEDICATIONS ON DISCHARGE     Medication List      START taking these medications      Instructions   * amiodarone 400 MG tablet  Commonly known as: PACERONE   Take 1 tablet by mouth 2 times a day for 7 days.  Dose: 400 mg     * amiodarone 200 MG Tabs  Start taking on: May 28,  2021  Commonly known as: Cordarone   Take 1 tablet by mouth every day.  Dose: 200 mg     apixaban 5mg Tabs  Commonly known as: ELIQUIS   Take 1 tablet by mouth 2 times a day. Indications: Thromboembolism secondary to Atrial Fibrillation  Dose: 5 mg     folic acid 1 MG Tabs  Start taking on: May 21, 2021  Commonly known as: FOLVITE   Take 1 tablet by mouth every day.  Dose: 1 mg     furosemide 40 MG Tabs  Commonly known as: LASIX   Take 1 tablet by mouth every day.  Dose: 40 mg     metoprolol SR 25 MG Tb24  Commonly known as: TOPROL XL   Take 1 tablet by mouth every day.  Dose: 25 mg     potassium chloride SA 10 MEQ Tbcr  Start taking on: May 21, 2021  Commonly known as: K-DUR   Take 1 tablet by mouth every day.  Dose: 10 mEq     spironolactone 25 MG Tabs  Start taking on: May 21, 2021  Commonly known as: ALDACTONE   Take 1 tablet by mouth every day.  Dose: 25 mg     thiamine 100 MG tablet  Start taking on: May 21, 2021  Commonly known as: THIAMINE   Take 1 tablet by mouth every day.  Dose: 100 mg     valsartan 80 MG Tabs  Commonly known as: DIOVAN   Take 1 tablet by mouth every day.  Dose: 80 mg         * This list has 2 medication(s) that are the same as other medications prescribed for you. Read the directions carefully, and ask your doctor or other care provider to review them with you.            CONTINUE taking these medications      Instructions   fluticasone 50 MCG/ACT nasal spray  Commonly known as: FLONASE   Administer 1 Spray into affected nostril(S) 2 times a day as needed (allergy).  Dose: 1 Spray     multivitamin-iron-minerals-folic acid chewable tablet   Chew 2 Tablets every day.  Dose: 2 tablet     VITAMIN C GUMMIE PO   Take 3-4 Tablets by mouth every day.  Dose: 3-4 tablet        STOP taking these medications    cephALEXin 500 MG Caps  Commonly known as: KEFLEX     NON SPECIFIED     predniSONE 20 MG Tabs  Commonly known as: DELTASONE            Allergies  No Known Allergies    DIET  No orders of the  defined types were placed in this encounter.      ACTIVITY  As tolerated.  Weight bearing as tolerated    CONSULTATIONS  Cardiology  Electrophysiology service    PROCEDURES  Left heart cath  ICD implantation    LABORATORY  Lab Results   Component Value Date    SODIUM 134 (L) 05/20/2021    POTASSIUM 4.0 05/20/2021    CHLORIDE 97 05/20/2021    CO2 28 05/20/2021    GLUCOSE 120 (H) 05/20/2021    BUN 22 05/20/2021    CREATININE 1.44 (H) 05/20/2021        Lab Results   Component Value Date    WBC 13.5 (H) 05/20/2021    HEMOGLOBIN 14.7 05/20/2021    HEMATOCRIT 43.7 05/20/2021    PLATELETCT 190 05/20/2021        Total time of the discharge process exceeds 45 minutes.  I spent the majority that time with the patient on the day of discharge reviewing discharge instructions including medications, follow-up, risks and benefits of all of his medications including his apixaban issues related to the ICD, and alcohol abuse.  On the day of discharge the patient is eager to go home, he is able to ambulate the unit on room air, and is feeling much better.

## 2021-05-20 NOTE — THERAPY
"Physical Therapy   Initial Evaluation     Patient Name: Ross Blake  Age:  50 y.o., Sex:  male  Medical Record #: [id number]  Today's Date: 5/20/2021     Precautions: Fall Risk, Cardiac Precautions (See Comments)    Assessment  This is a 50-year-old gentleman who was previously healthy other than history of alcohol abuse.  He presented to [facility] on May 14 with complaint of worsening leg edema.  At that time he was found to be in atrial fibrillation with rapid ventricular response and is now s/p ICD placement.  PT cardiac rehab order received and chart reviewed. A cardiac handout was provided and discussed regarding talk test/RPE scale to modify home walking program, modifiable cardiac risk factors and role/timeline of outpatient cardiac rehab. Patient reporting compliance and understanding. No further acute PT cardiac rehab needs identified.     Plan  DC Equipment Recommendations: None  Discharge Recommendations: Anticipate that the patient will have no further physical therapy needs after discharge from the hospital       Subjective    \"I'm doing better\"     Objective       05/20/21 0900   Precautions   Precautions Fall Risk;Cardiac Precautions (See Comments)   Comments new ICD in place. ETOH withdrawal    Vitals   Pulse 91   Blood Pressure (!) 95/58  (Notified Hospitalist)   Respiration 15   Pulse Oximetry 91 %   O2 Delivery Device None - Room Air   Pain 0 - 10 Group   Therapist Pain Assessment Post Activity Pain Same as Prior to Activity;0   Prior Living Situation   Prior Services None   Housing / Facility 1 Story House   Equipment Owned None   Lives with - Patient's Self Care Capacity Spouse;Child Less than 18 Years of Age   Comments lives with wife and 2 children    Prior Level of Functional Mobility   Bed Mobility Independent   Transfer Status Independent   Ambulation Independent   Distance Ambulation (Feet)   (community distances )   Assistive Devices Used None   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 Patient walked for exercise    History of Falls   History of Falls No   Cognition    Cognition / Consciousness X   Speech/ Communication Delayed Responses   Level of Consciousness Alert   Comments some stuttering with speech    Passive ROM Lower Body   Passive ROM Lower Body WDL   Active ROM Lower Body    Active ROM Lower Body  WDL   Strength Lower Body   Lower Body Strength  WDL   Sensation Lower Body   Lower Extremity Sensation   WDL   Strength Upper Body   Upper Body Strength  X   Comments limited by ICD precautions    Balance Assessment   Sitting Balance (Static) Fair +   Sitting Balance (Dynamic) Fair +   Standing Balance (Static) Fair   Standing Balance (Dynamic) Fair +   Weight Shift Sitting Fair   Weight Shift Standing Fair   Comments no AD   Gait Analysis   Gait Level Of Assist Supervised   Assistive Device None   Distance (Feet) 40   # of Times Distance was Traveled 1   Comments no LOB of excess lateral sway    Bed Mobility    Comments found sitting EOB    Functional Mobility   Sit to Stand Supervised   Bed, Chair, Wheelchair Transfer Supervised   How much difficulty does the patient currently have...   Turning over in bed (including adjusting bedclothes, sheets and blankets)? 4   Sitting down on and standing up from a chair with arms (e.g., wheelchair, bedside commode, etc.) 4   Moving from lying on back to sitting on the side of the bed? 4   How much help from another person does the patient currently need...   Moving to and from a bed to a chair (including a wheelchair)? 4   Need to walk in a hospital room? 4   Climbing 3-5 steps with a railing? 4   6 clicks Mobility Score 24   Activity Tolerance   Sitting Edge of Bed 10 min   Standing 5 min    Education Group   Education Provided Role of Physical Therapist;Cardiac Precautions   Cardiac Precautions Patient Response Patient;Family;Acceptance;Explanation;Demonstration;Handout;Verbal Demonstration;Action Demonstration   Role of Physical  Therapist Patient Response Patient;Family;Acceptance;Explanation;Demonstration;Verbal Demonstration   Anticipated Discharge Equipment and Recommendations   DC Equipment Recommendations None   Discharge Recommendations Anticipate that the patient will have no further physical therapy needs after discharge from the hospital     Olnea Shields, PT, DPT, GCS

## 2021-05-20 NOTE — TELEPHONE ENCOUNTER
Patient wife called back and he was being seen by Dr. Morales in [place] and testing was done at an independent place. He does not recall the name. He did state he can call and have the information faxed. He also saw a cardiologist at [facility] about 3-4 weeks ago.      Thank you,    Maryam ROMANO

## 2021-05-20 NOTE — TELEPHONE ENCOUNTER
Called new patient in regards to his appointment with Tracey Murrell on 05/27/21 @ 1:45pm to ask about his previous cardiologist, recent blood work/cardiac testing, and to let him know he will have an EKG on his appointment date. No answer, left voicemail with a callback number.

## 2021-05-20 NOTE — PROGRESS NOTES
Discharge education and instructions provided. Family at bedside and answered all questions. No new complaints. Wheelchair escort to main entrance for private vehicle

## 2021-05-20 NOTE — PROGRESS NOTES
Monitor summary:        Rhythm: SR/ST  Rate: 's   Ectopy: /  Measurements: .16/.12/.40        12hr chart check

## 2021-05-20 NOTE — PROGRESS NOTES
Cardiology Follow Up Progress Note    Date of Service  5/20/2021    Attending Physician  Jey Huston M.D.    Chief Complaint   Worsening bilateral edema and exertional dyspnea     Cardiology consult   New onset of systolic heart failure     HPI  Ross Barry is a 50 y.o. male admitted 5/16/2021 with bilateral edema and exertional dyspnea.  He was transferred from [facility] with evaluation for new onset systolic heart failure and an episode of ventricular fibrillation. 5/15 CPR was performed.  Patient was pulseless.  He reverted back to normal sinus rhythm with successful defibrillation.  Coronary angiogram 5/17 showed nonobstructive coronary artery disease.    History of alcohol abuse     Interim Events  Patient ambulated in the hallway without any oxygen on room air. Denies any chest pain, sob, dizziness or palpitations.  Sore at the AICD implant site.     Right radial is soft and good peripheral pulse   K 4.2  Na 135    Review of Systems  Review of Systems   Constitutional: Positive for fatigue. Negative for chills, diaphoresis and fever.   HENT: Negative for nosebleeds and trouble swallowing.    Respiratory: Negative for cough, chest tightness and shortness of breath.    Cardiovascular: Positive for leg swelling. Negative for chest pain and palpitations.   Gastrointestinal: Negative for abdominal distention, abdominal pain and blood in stool.   Genitourinary: Negative for hematuria.   Skin: Negative for color change.   Neurological: Negative for dizziness, syncope and numbness.   Psychiatric/Behavioral: Negative for agitation and confusion. The patient is not nervous/anxious.        Vital signs in last 24 hours  Temp:  [36.4 °C (97.5 °F)-36.6 °C (97.9 °F)] 36.4 °C (97.5 °F)  Pulse:  [] 91  Resp:  [15-36] 15  BP: ()/(39-68) 95/58  SpO2:  [88 %-96 %] 88 %    Physical Exam  Physical Exam  Vitals and nursing note reviewed.   Constitutional:       Appearance: Normal appearance. He is not  ill-appearing.   HENT:      Head: Normocephalic and atraumatic.   Eyes:      Pupils: Pupils are equal, round, and reactive to light.   Neck:      Vascular: JVD present.   Cardiovascular:      Rate and Rhythm: Normal rate and regular rhythm.      Heart sounds: Normal heart sounds. No murmur heard.        Comments: AICD in place, in the left upper chest   Pulmonary:      Effort: Pulmonary effort is normal.      Breath sounds: No rales.   Abdominal:      General: Abdomen is flat.   Musculoskeletal:      Cervical back: Normal range of motion.      Right lower le+ Pitting Edema present.      Left lower le+ Pitting Edema present.   Skin:     General: Skin is warm and dry.   Neurological:      General: No focal deficit present.      Mental Status: He is alert and oriented to person, place, and time.      Motor: Tremor present.   Psychiatric:         Mood and Affect: Mood normal.         Behavior: Behavior normal.         Thought Content: Thought content normal.         Judgment: Judgment normal.         Lab Review  Lab Results   Component Value Date/Time    WBC 13.5 (H) 2021 05:28 AM    RBC 4.31 (L) 2021 05:28 AM    HEMOGLOBIN 14.7 2021 05:28 AM    HEMATOCRIT 43.7 2021 05:28 AM    .4 (H) 2021 05:28 AM    MCH 34.1 (H) 2021 05:28 AM    MCHC 33.6 (L) 2021 05:28 AM    MPV 10.0 2021 05:28 AM      Lab Results   Component Value Date/Time    SODIUM 134 (L) 2021 05:28 AM    POTASSIUM 4.0 2021 05:28 AM    CHLORIDE 97 2021 05:28 AM    CO2 28 2021 05:28 AM    GLUCOSE 120 (H) 2021 05:28 AM    BUN 22 2021 05:28 AM    CREATININE 1.44 (H) 2021 05:28 AM      Lab Results   Component Value Date/Time    ASTSGOT 15 2021 05:28 AM    ALTSGPT 20 2021 05:28 AM     Lab Results   Component Value Date/Time    TROPONINT 26 (H) 05/15/2021 06:00 AM       Recent Labs     21  0530   NTPROBNP 910*       Cardiac Imaging and Procedures  Review  EKG:  NSR     Echocardiogram:  5/14/21  CONCLUSIONS  Normal left ventricular chamber size. Moderately reduced left   ventricular systolic function. Left ventricular ejection fraction is   visually estimated to be 35%.  Normal right ventricular size and systolic function.  Mild to moderate mitral regurgitation.  Estimated right ventricular systolic pressure is 45 mmHg; mild   pulmonary hypertension.  Right atrial pressure is estimated to be 15 mmHg.  No pericardial effusion seen.      Cardiac Catheterization:   5/17/2021  AORTIC VALVE:  There is no significant gradient noted.     LEFT VENTRICULOGRAM:  A 10 mL of contrast were delivered for 3 seconds.    Ejection fraction was estimated to be 25%.  There was global hypokinesis noted   at angiogram.     LEFT MAIN CORONARY ARTERY:  Left main coronary artery is a long, large-caliber   vessel free of disease.     LEFT ANTERIOR DESCENDING ARTERY:  Left anterior descending artery is a long   vessel, which wraps around the apex.  It originates as a very large caliber   vessel and tapers to small caliber at the apex.  There are moderate to small   caliber diagonal branches noted.     RAMUS INTERMEDIUS:  Ramus intermedius is a large bifurcating vessel free of   disease.     LEFT CIRCUMFLEX ARTERY:  Left circumflex artery is a nondominant moderate   caliber vessel with small obtuse marginal branches.  Left circumflex artery   and its branches are free of disease.     RIGHT CORONARY ARTERY:  Right coronary artery is a dominant large caliber   vessel with long large-caliber posterior descending artery, which wraps the   apex as well as a moderate to large caliber posterolateral branch.  Right   coronary artery and its branches are free of disease.     IMPRESSION:  1.  No angiographic evidence of coronary artery disease.  2.  Reduced left ventricular systolic function with ejection fraction 25%.  3.  Elevated left ventricular end-diastolic pressure.      Assessment/Plan  No  new Assessment & Plan notes have been filed under this hospital service since the last note was generated.  Service: Cardiology    1. VF Cardiac Arrest:  - Transfer to [facility] for VF cardiac arrest S/P defibrillation x 2.   - coronary angiogram showed nonobstructive CAD   - s/p meditronic dual chamber AICD 5/18/2021  - continue amiodarone per EP   - Remains in Sinus at this time.   - outpatient cardiac MRI      2. Acute congestive Heart Failure:  - ECHO showed ef 35%  - Etiology unclear at this time, achycardia mediated vs alcohol mediated cardiomyopathy.   - continue GDMT, coreg 6.25mg BID, aldactone 25mg qd, and valsartan 80mg BID  - transition furosemide 20mg BID IV to po furosemide 40mg qd   - strict I and O, daily stand up weight     3. Paroxysmal atrial fibrillation:  - in NSR currently   - EP transitioned him from coreg to toprol   - continue eliquis 5mg BID      4. Alcohol abuse:  - had long discussion counseling    Future Appointments   Date Time Provider Department Center   5/27/2021  1:45 PM SAILAJA [facility] None   6/24/2021 11:15 AM DOMINGA GuerinRABNER [facility] None          Thank you for allowing me to participate in the care of this patient.  Cardiology will sign off.     Please contact me with any questions.    JA Matute   [facility]

## 2021-05-20 NOTE — DISCHARGE INSTRUCTIONS
Discharge Instructions    Discharged to home by car with relative. Discharged via wheelchair, hospital escort: Yes.  Special equipment needed: Not Applicable    Be sure to schedule a follow-up appointment with your primary care doctor or any specialists as instructed.     Discharge Plan:        I understand that a diet low in cholesterol, fat, and sodium is recommended for good health. Unless I have been given specific instructions below for another diet, I accept this instruction as my diet prescription.   Other diet: Cardiac Diet    Special Instructions: None    · Is patient discharged on Warfarin / Coumadin?   No     Depression / Suicide Risk    As you are discharged from this [facility] facility, it is important to learn how to keep safe from harming yourself.    Recognize the warning signs:  · Abrupt changes in personality, positive or negative- including increase in energy   · Giving away possessions  · Change in eating patterns- significant weight changes-  positive or negative  · Change in sleeping patterns- unable to sleep or sleeping all the time   · Unwillingness or inability to communicate  · Depression  · Unusual sadness, discouragement and loneliness  · Talk of wanting to die  · Neglect of personal appearance   · Rebelliousness- reckless behavior  · Withdrawal from people/activities they love  · Confusion- inability to concentrate     If you or a loved one observes any of these behaviors or has concerns about self-harm, here's what you can do:  · Talk about it- your feelings and reasons for harming yourself  · Remove any means that you might use to hurt yourself (examples: pills, rope, extension cords, firearm)  · Get professional help from the community (Mental Health, Substance Abuse, psychological counseling)  · Do not be alone:Call your Safe Contact- someone whom you trust who will be there for you.  · Call your local CRISIS HOTLINE 934-6371 or 585-143-6593  · Call your local Children's Mobile  Crisis Response Team [place] (000) 088-4408 or www.[employer].[place]  · Call the toll free National Suicide Prevention Hotlines   · National Suicide Prevention Lifeline 530-854-KOLH (7959)  · National Hope Line Network 800-SUICIDE (915-0847)

## 2021-05-20 NOTE — CARE PLAN
The patient is Stable - Low risk of patient condition declining or worsening           Problem: Care Map:  Day 3 Optimal Outcome for the Heart Failure Patient  Goal: Day 3:  Optimal Care of the heart failure patient  Outcome: Progressing  Intervention: Start Heart Failure education booklet, provide writing utensils and notepad for questions  Note: Completed  Intervention: For patient's with heart failure exacerbation, identify precipitant (diet, med compliance, etc.) and direct education towards lifestyle changes to prevent exacerbations.  Note: Completed  Intervention: Instruct patient to write down weights on symptom tracker daily  Note: Completed, with wife at bedside  Intervention: Document ambulation tolerance (functional assessment) in ADL flowsheet daily  Note: Completed  Intervention: Assess edema every shift (peripheral, sacral, periorbital, perineal/scrotal, and abdominal)  Note: Completed

## 2021-05-24 NOTE — TELEPHONE ENCOUNTER
[facility]  Phone: [phone]   Fax:[phone]

## 2021-05-27 ENCOUNTER — OFFICE VISIT (OUTPATIENT)
Dept: CARDIOLOGY | Facility: MEDICAL CENTER | Age: 51
End: 2021-05-27
Payer: COMMERCIAL

## 2021-05-27 ENCOUNTER — NON-PROVIDER VISIT (OUTPATIENT)
Dept: CARDIOLOGY | Facility: MEDICAL CENTER | Age: 51
End: 2021-05-27
Payer: COMMERCIAL

## 2021-05-27 VITALS
HEART RATE: 82 BPM | DIASTOLIC BLOOD PRESSURE: 68 MMHG | HEIGHT: 72 IN | SYSTOLIC BLOOD PRESSURE: 100 MMHG | BODY MASS INDEX: 34.13 KG/M2 | OXYGEN SATURATION: 93 % | RESPIRATION RATE: 17 BRPM | WEIGHT: 252 LBS

## 2021-05-27 DIAGNOSIS — I10 ESSENTIAL HYPERTENSION, BENIGN: ICD-10-CM

## 2021-05-27 DIAGNOSIS — I50.20 ACC/AHA STAGE C SYSTOLIC HEART FAILURE (HCC): ICD-10-CM

## 2021-05-27 DIAGNOSIS — Z79.899 HIGH RISK MEDICATION USE: ICD-10-CM

## 2021-05-27 PROCEDURE — 99214 OFFICE O/P EST MOD 30 MIN: CPT | Mod: 24 | Performed by: NURSE PRACTITIONER

## 2021-05-27 PROCEDURE — 99999 PR NO CHARGE: CPT | Performed by: INTERNAL MEDICINE

## 2021-05-27 RX ORDER — DAPAGLIFLOZIN 10 MG/1
10 TABLET, FILM COATED ORAL DAILY
Qty: 30 TABLET | Refills: 11 | Status: SHIPPED | OUTPATIENT
Start: 2021-05-27 | End: 2021-11-12 | Stop reason: SDUPTHER

## 2021-05-27 RX ORDER — SILDENAFIL 100 MG/1
TABLET, FILM COATED ORAL
COMMUNITY
Start: 2021-04-02 | End: 2021-06-17

## 2021-05-27 ASSESSMENT — FIBROSIS 4 INDEX: FIB4 SCORE: 0.85

## 2021-05-27 NOTE — PATIENT INSTRUCTIONS
Checking Blood Pressure:  -Blood pressure cuff, spend in the $40-65, with good return policy  -It should be automatic, upper arm, measure your arm to get the correct size, probably adult Large  -Put the cuff in place, rest arm on table near height of your heart, sit quietly for 5 min, legs uncrossed, with back support, then take your blood pressure, write it down, keep a log  -Check once a day. Ideally, 2 hours after medications.  -Can bring your cuff to at least one appointment where it can be calibrated to a manual cuff if you are concerned.  -Goal blood pressure is at least under 130/80, ideally under 120/80.  If you think your BP is overall too high, let us know in the office, we can adjust medications, can use [employer] or call the [facility] office: [phone].  -If you feel dizzy, please check your blood pressure.  If your blood pressure is less than 90/60 with dizziness call our office at [phone].    -Continue to monitor blood pressures, heart rates, and daily weights in log provided in office today. Please bring to next appointment to review with provider.     Salt=sodium=sea salt, guidelines say stay under 2,500 mg daily   Get salt smart, start looking at labels, count it up.  Salt is hidden in everything, salad dressing, sauces, cheese, most canned food, any processed meat.

## 2021-05-27 NOTE — PROGRESS NOTES
Chief Complaint   Patient presents with   • Congestive Heart Failure       Subjective:   Ross Blake is a 50 y.o. male who presents today for heart failure follow-up with his wife.  Patient was recently admitted on 5/15/2021 for TURK and lower extremity edema.  Patient had an episode of V. fib/cardiac arrest while admitted on 5/15/2021.  Patient underwent coronary angiogram on 5/17 which showed nonobstructive coronary artery disease.  Patient underwent AICD placement on 5/18/2021.  Patient was also discovered to have newly reduced ejection fraction at 35%.    Patient reports past medical history of venous stasis with history of vein stripping.  Patient reports a history of alcohol use.    Today, patient feels well, denies chest pain, shortness of breath, palpitations, dizziness/lightheadedness, orthopnea, PND or Edema.  Patient reports that he is slowly resuming his activities of daily living from discharge well keeping ROM restrictions for AICD placement.  AICD was interrogated in office today; it showed normal function.  Lengthy discussion today regarding medication compliance and prognosis of heart failure.  Discussed importance of Etoh cessation.  We will also consider cardiac MRI 6 months post AICD insertion if EF does not improve with 3 months of GDMT.  We will also refer for pharmacotherapy services for assistance in titrating GDMT.  Current titrations in office today discussed per below. Discussed the importance of monitoring weights and blood pressure at home daily. Call office if weight increasing greater than 3 lbs in 1 day or greater than 5 lbs in 1 week. Written parameters for notifying office also provided in AVS.    Today, based on physical examination findings, patient is euvolemic. No JVD, lungs are clear to auscultation, baseline 1+ nonpitting edema in bilateral lower extremities, no ascites. Dry weight is 252 lbs.    History reviewed. No pertinent past medical history.  History reviewed. No  pertinent surgical history.  History reviewed. No pertinent family history.  Social History     Socioeconomic History   • Marital status:      Spouse name: Not on file   • Number of children: Not on file   • Years of education: Not on file   • Highest education level: Not on file   Occupational History   • Not on file   Tobacco Use   • Smoking status: Former Smoker     Types: Cigarettes   • Smokeless tobacco: Never Used   Vaping Use   • Vaping Use: Former   • Quit date: 5/14/2021   Substance and Sexual Activity   • Alcohol use: Yes     Comment: Occasionally   • Drug use: No   • Sexual activity: Not on file   Other Topics Concern   • Not on file   Social History Narrative   • Not on file     Social Determinants of Health     Financial Resource Strain:    • Difficulty of Paying Living Expenses:    Food Insecurity:    • Worried About Running Out of Food in the Last Year:    • Ran Out of Food in the Last Year:    Transportation Needs:    • Lack of Transportation (Medical):    • Lack of Transportation (Non-Medical):    Physical Activity:    • Days of Exercise per Week:    • Minutes of Exercise per Session:    Stress:    • Feeling of Stress :    Social Connections:    • Frequency of Communication with Friends and Family:    • Frequency of Social Gatherings with Friends and Family:    • Attends [employer] Services:    • Active Member of Clubs or Organizations:    • Attends Club or Organization Meetings:    • Marital Status:    Intimate Partner Violence:    • Fear of Current or Ex-Partner:    • Emotionally Abused:    • Physically Abused:    • Sexually Abused:      No Known Allergies  Outpatient Encounter Medications as of 5/27/2021   Medication Sig Dispense Refill   • Dapagliflozin Propanediol 10 MG Tab Take 10 mg by mouth every day. 30 tablet 11   • amiodarone (CORDARONE) 200 MG Tab Take 1 tablet by mouth every day. 30 tablet 0   • apixaban (ELIQUIS) 5mg Tab Take 1 tablet by mouth 2 times a day. Indications:  Thromboembolism secondary to Atrial Fibrillation 60 tablet 0   • folic acid (FOLVITE) 1 MG Tab Take 1 tablet by mouth every day. 30 tablet 0   • furosemide (LASIX) 40 MG Tab Take 1 tablet by mouth every day. 30 tablet 0   • potassium chloride SA (K-DUR) 10 MEQ Tab CR Take 1 tablet by mouth every day. 30 tablet 11   • spironolactone (ALDACTONE) 25 MG Tab Take 1 tablet by mouth every day. 30 tablet 0   • thiamine (THIAMINE) 100 MG tablet Take 1 tablet by mouth every day. 30 tablet 0   • valsartan (DIOVAN) 80 MG Tab Take 1 tablet by mouth every day. 30 tablet 0   • metoprolol SR (TOPROL XL) 25 MG TABLET SR 24 HR Take 1 tablet by mouth every day. 30 tablet 0   • Ascorbic Acid (VITAMIN C GUMMIE PO) Take 3-4 Tablets by mouth every day.     • multivitamin-iron-minerals-folic acid (CENTRUM) chewable tablet Chew 2 Tablets every day.     • sildenafil citrate (VIAGRA) 100 MG tablet TAKE 1 TABLET BY MOUTH ONCE DAILY AS NEEDED 1 HOUR PRIOR TO INTERCOURSE (Patient not taking: Reported on 5/27/2021)     • [DISCONTINUED] amiodarone (PACERONE) 400 MG tablet Take 1 tablet by mouth 2 times a day for 7 days. 28 tablet 0   • [DISCONTINUED] fluticasone (FLONASE) 50 MCG/ACT nasal spray Administer 1 Spray into affected nostril(S) 2 times a day as needed (allergy). (Patient not taking: Reported on 5/27/2021)       No facility-administered encounter medications on file as of 5/27/2021.     Review of Systems   Constitutional: Negative for fever, malaise/fatigue and weight loss.   Eyes: Negative for blurred vision.   Respiratory: Negative for cough and shortness of breath.    Cardiovascular: Negative for chest pain, palpitations, orthopnea, claudication, leg swelling and PND.        Denies AICD shocks   Gastrointestinal: Negative for abdominal pain, blood in stool, nausea and vomiting.   Genitourinary: Negative for dysuria, frequency and hematuria.   Musculoskeletal: Negative for falls and myalgias.   Neurological: Negative for dizziness,  tingling and loss of consciousness.   Endo/Heme/Allergies: Does not bruise/bleed easily.        Objective:   /68 (BP Location: Left arm, Patient Position: Sitting, BP Cuff Size: Adult)   Pulse 82   Resp 17   Ht 1.829 m (6')   Wt 114 kg (252 lb)   SpO2 93%   BMI 34.18 kg/m²     Physical Exam   Constitutional: He is oriented to person, place, and time. He appears well-developed. No distress.   HENT:   Head: Normocephalic and atraumatic.   Eyes: Pupils are equal, round, and reactive to light.   Neck: No JVD present.   Cardiovascular: Normal rate, regular rhythm, normal heart sounds and normal pulses. Exam reveals no gallop and no friction rub.   No murmur heard.  Pulmonary/Chest: Effort normal and breath sounds normal. No respiratory distress.   Abdominal: Soft. Normal appearance and bowel sounds are normal. He exhibits no distension.   Musculoskeletal:      Right lower leg: Edema (1+nonpitting; baseline) present.      Left lower leg: Edema (1+nonpitting; baseline ) present.   Neurological: He is alert and oriented to person, place, and time.   Skin: Skin is warm and dry. No erythema.   Psychiatric: His behavior is normal. Mood normal.   Vitals reviewed.      Assessment:     1. ACC/AHA stage C systolic heart failure (HCC)  Basic Metabolic Panel    REFERRAL TO PHARMACOTHERAPY SERVICE   2. Essential hypertension, benign  Basic Metabolic Panel    REFERRAL TO PHARMACOTHERAPY SERVICE   3. High risk medication use  Basic Metabolic Panel    REFERRAL TO PHARMACOTHERAPY SERVICE     No results found for: CHOLSTRLTOT, LDL, HDL, TRIGLYCERIDE    Lab Results   Component Value Date/Time    SODIUM 134 (L) 05/20/2021 05:28 AM    POTASSIUM 4.0 05/20/2021 05:28 AM    CHLORIDE 97 05/20/2021 05:28 AM    CO2 28 05/20/2021 05:28 AM    GLUCOSE 120 (H) 05/20/2021 05:28 AM    BUN 22 05/20/2021 05:28 AM    CREATININE 1.44 (H) 05/20/2021 05:28 AM     Lab Results   Component Value Date/Time    ALKPHOSPHAT 69 05/20/2021 05:28 AM     ASTSGOT 15 05/20/2021 05:28 AM    ALTSGPT 20 05/20/2021 05:28 AM    TBILIRUBIN 0.7 05/20/2021 05:28 AM       Ref. Range 5/18/2021 05:30   NT-proBNP Latest Ref Range: 0 - 125 pg/mL 910 (H)     Echocardiogram:  5/14/21  CONCLUSIONS  Normal left ventricular chamber size. Moderately reduced left   ventricular systolic function. Left ventricular ejection fraction is   visually estimated to be 35%.  Normal right ventricular size and systolic function.  Mild to moderate mitral regurgitation.  Estimated right ventricular systolic pressure is 45 mmHg; mild   pulmonary hypertension.  Right atrial pressure is estimated to be 15 mmHg.  No pericardial effusion seen.     Cardiac Catheterization:   5/17/2021  AORTIC VALVE:  There is no significant gradient noted.     LEFT VENTRICULOGRAM:  A 10 mL of contrast were delivered for 3 seconds.    Ejection fraction was estimated to be 25%.  There was global hypokinesis noted   at angiogram.     LEFT MAIN CORONARY ARTERY:  Left main coronary artery is a long, large-caliber   vessel free of disease.     LEFT ANTERIOR DESCENDING ARTERY:  Left anterior descending artery is a long   vessel, which wraps around the apex.  It originates as a very large caliber   vessel and tapers to small caliber at the apex.  There are moderate to small   caliber diagonal branches noted.     RAMUS INTERMEDIUS:  Ramus intermedius is a large bifurcating vessel free of   disease.     LEFT CIRCUMFLEX ARTERY:  Left circumflex artery is a nondominant moderate   caliber vessel with small obtuse marginal branches.  Left circumflex artery   and its branches are free of disease.     RIGHT CORONARY ARTERY:  Right coronary artery is a dominant large caliber   vessel with long large-caliber posterior descending artery, which wraps the   apex as well as a moderate to large caliber posterolateral branch.  Right   coronary artery and its branches are free of disease.     IMPRESSION:  1.  No angiographic evidence of coronary  artery disease.  2.  Reduced left ventricular systolic function with ejection fraction 25%.  3.  Elevated left ventricular end-diastolic pressure.    MDT Device Interrogation (5/19/2021):  RA: p waves 3.0 mV, Impedance 494 ohms, Threshold 0.25 V  RV: r waves 8.1 mV, Impedance 532 ohms, Threshold, 0.5 V, HV Impedance 63 ohms.     Chest X-Ray (5/19/21):   1.  Pulmonary edema and/or infiltrates are identified, which are stable since the prior exam.  2.  Cardiomegaly    Medical Decision Making:  Today's Assessment / Status / Plan:   HFrEF, Stage C, Class II, LVEF 35%  -Heart failure due to non-ischemic cardiomyopathy toxin VS rate-related  -Continue valsartan 80 mg BID  -Continue Coreg 6.25 mg BID  -Continue spironolactone 25 mg daily  -Initiate farxiga 10 mg daily. Education for potential ADRs with new medication; patient verbalizes understanding.   -Continue furosemide 40 mg daily. Consider reduction if patient remains euvolemic  -Labs: bmp in 2 weeks  -AICD placed 5/18/2021 for secondary prevention  -Consider Cardiac MRI if EF does not improve after 3 months GDMT  -Reinforced s/sx of worsening heart failure with patient and weight monitoring. Pt verbalizes understanding. Pt to call office if present.    -Heart Failure Education: pt to be contacted by HF nurse for further education  -Advanced care planning: Discussed prognosis and mortality rate with patient and wife regarding heart failure; patient verbalizes understanding.  Will continue to monitor    VF Cardiac cardiac arrest S/P AICD; Paroxymal Afib  -meditronic dual chamber AICD 5/18/2021  -Device interrogated today showing normal function.  -continue amiodarone 200 mg daily   -Eliquis 5 mg BID  -FU with EP on 6/24/2021    ETOH abuse  -Tx for withdrawals while admitted per chart review. Patient denies excessive etoh use. Discussed importance of cessation for heart function.     Venous Stasis  -Hx of vein stripping and ablations (x3-most recent 2020) for varicose  veins post remote lower extremity trauma.  -Chronic lower extremity edema; patient reports currently baseline    MANSOOR; Mild pulmonary hypertension   -Patient reports pending sleep medicine evaluation.    FU in clinic in 1 month. Sooner if needed.    Patient verbalizes understanding and agrees with the plan of care.     Collaborating MD: Dr. Chino MD    PLEASE NOTE: This Note was created using voice recognition Software. I have made every reasonable attempt to correct obvious errors, but I expect that there are errors of grammar and possibly content that I did not discover before finalizing the note

## 2021-05-28 NOTE — NON-PROVIDER
Wound site is healing well.  Patient advised to watch for increased redness, swelling, oozing or fever--verbalized understanding.

## 2021-06-03 ENCOUNTER — TELEPHONE (OUTPATIENT)
Dept: VASCULAR LAB | Facility: MEDICAL CENTER | Age: 51
End: 2021-06-03

## 2021-06-03 ASSESSMENT — ENCOUNTER SYMPTOMS
ABDOMINAL PAIN: 0
MYALGIAS: 0
PALPITATIONS: 0
LOSS OF CONSCIOUSNESS: 0
FEVER: 0
SHORTNESS OF BREATH: 0
TINGLING: 0
ORTHOPNEA: 0
WEIGHT LOSS: 0
VOMITING: 0
DIZZINESS: 0
BLURRED VISION: 0
FALLS: 0
BRUISES/BLEEDS EASILY: 0
PND: 0
NAUSEA: 0
COUGH: 0
BLOOD IN STOOL: 0
CLAUDICATION: 0

## 2021-06-03 NOTE — TELEPHONE ENCOUNTER
[facility]      Called and left msg for pt to call back to establish care regarding referral for CHF management  from Tracey PERES on 5/27/21.         Insurance: Billy COTA  PCP: [facility]  Locations to be seen: [facility]     Phone number left for return call or any questions or concerns.  [facility] at [phone], fax [phone]      Keyonna EdwardsD

## 2021-06-08 DIAGNOSIS — I50.21 ACUTE SYSTOLIC HEART FAILURE (HCC): ICD-10-CM

## 2021-06-08 RX ORDER — FUROSEMIDE 40 MG/1
40 TABLET ORAL DAILY
Qty: 30 TABLET | Refills: 0 | Status: SHIPPED | OUTPATIENT
Start: 2021-06-08 | End: 2021-06-17 | Stop reason: SDUPTHER

## 2021-06-17 ENCOUNTER — NON-PROVIDER VISIT (OUTPATIENT)
Dept: CARDIOLOGY | Facility: MEDICAL CENTER | Age: 51
End: 2021-06-17
Payer: COMMERCIAL

## 2021-06-17 DIAGNOSIS — I50.21 ACUTE SYSTOLIC HEART FAILURE (HCC): ICD-10-CM

## 2021-06-17 PROCEDURE — 99211 OFF/OP EST MAY X REQ PHY/QHP: CPT | Performed by: INTERNAL MEDICINE

## 2021-06-17 RX ORDER — SPIRONOLACTONE 25 MG/1
25 TABLET ORAL DAILY
Qty: 30 TABLET | Refills: 2 | Status: SHIPPED
Start: 2021-06-17 | End: 2021-07-01

## 2021-06-17 RX ORDER — VALSARTAN 80 MG/1
80 TABLET ORAL DAILY
Qty: 30 TABLET | Refills: 2 | Status: SHIPPED | OUTPATIENT
Start: 2021-06-17 | End: 2021-07-01 | Stop reason: SDUPTHER

## 2021-06-17 RX ORDER — FUROSEMIDE 40 MG/1
40 TABLET ORAL DAILY
Qty: 30 TABLET | Refills: 2 | Status: SHIPPED | OUTPATIENT
Start: 2021-06-17 | End: 2021-07-01

## 2021-06-17 RX ORDER — METOPROLOL SUCCINATE 25 MG/1
25 TABLET, EXTENDED RELEASE ORAL DAILY
Qty: 30 TABLET | Refills: 2 | Status: SHIPPED | OUTPATIENT
Start: 2021-06-17 | End: 2021-07-01

## 2021-06-17 NOTE — Clinical Note
Octavio Butts! Can you request [facility] to fax over pt's most recent lab results? He said he got them drawn on 6/8. Tysm!

## 2021-06-17 NOTE — NON-PROVIDER
CHF Pharmacotherapy visit - Visit    Date of Service: 06/17/21    Informed written consent was given on: Unable to provide today    Ross Blake is here for CHF    HPI  Pertinent Interval History since last visit:   • This is pt's baseline visit. Pt was diagnosed with CHF during his recent admission.    Most recent EF:  35%  On 5/14/21      Current Outpatient Medications:   •  spironolactone, 25 mg, Oral, DAILY  •  valsartan, 80 mg, Oral, DAILY  •  metoprolol SR, 25 mg, Oral, DAILY  •  furosemide, 40 mg, Oral, DAILY  •  Dapagliflozin Propanediol, 10 mg, Oral, DAILY  •  amiodarone, 200 mg, Oral, DAILY  •  apixaban, 5 mg, Oral, BID  •  folic acid, 1 mg, Oral, DAILY  •  potassium chloride SA, 10 mEq, Oral, DAILY  •  thiamine, 100 mg, Oral, DAILY  •  Ascorbic Acid (VITAMIN C GUMMIE PO), 3-4 tablet, Oral, DAILY  •  multivitamin-iron-minerals-folic acid, 2 tablet, Oral, DAILY    Current Adherence to CHF Therapies:  Complete    Current CHF Medications - including dose:   • Entresto or ACE/ARB:Valsartan 80 daily  • Beta blocker: Metoprolol SR 25 mg daily  • Diuretic:Furosemide 40 mg daily  • Aldosterone antagonist: Spironolactone 25 mg daily    There were no vitals filed for this visit.    Home BP and HR:  wnl - 120s/80s    Change in weight: Lost 13 lbs since last visit    SOCIAL HISTORY  Social History     Tobacco Use   Smoking Status Former Smoker   • Types: Cigarettes   Smokeless Tobacco Never Used        DATA REVIEW  Lab Results   Component Value Date/Time    SODIUM 134 (L) 05/20/2021 05:28 AM    POTASSIUM 4.0 05/20/2021 05:28 AM    CHLORIDE 97 05/20/2021 05:28 AM    CO2 28 05/20/2021 05:28 AM    GLUCOSE 120 (H) 05/20/2021 05:28 AM    BUN 22 05/20/2021 05:28 AM    CREATININE 1.44 (H) 05/20/2021 05:28 AM     Lab Results   Component Value Date/Time    ALKPHOSPHAT 69 05/20/2021 05:28 AM    ASTSGOT 15 05/20/2021 05:28 AM    ALTSGPT 20 05/20/2021 05:28 AM    TBILIRUBIN 0.7 05/20/2021 05:28 AM    INR 1.27 (H) 05/17/2021  03:20 AM    ALBUMIN 3.0 (L) 05/20/2021 05:28 AM        Renal function:  Calculated creatinine clearance: pending results from [facility] (drawn on 6/8/21)    Other Pertinent Blood Work:     Other:  Immunization History   Administered Date(s) Administered   • Influenza (IM) Preservative Free - HISTORICAL DATA 10/18/2016, 12/07/2017   • Influenza Seasonal Injectable - Historical Data 09/11/2014   • Influenza Vaccine Quad Inj (Pf) 10/10/2018   • Influenza, Unspecified - HISTORICAL DATA 11/11/2019   • Pfizer SARS-CoV-2 Vaccine 04/23/2021     Up to date on pneumococcal vaccine? Pt was offerred PPSV23 during admission, but administration was deferred    Recent Imaging Studies:    None since last visit      ASSESSMENT AND PLAN  • Extensive education on CHF pathophysiology and medications provided during today's visit.  • Pt has lost 13 lbs since his visit with Tracey PERES. He states he overall feels better and more energetic.   • Pt had a lot of questions during this visit, so unfortunately we did not have time to make any interventions today. Pt needed refills on his medications.    Resulting CHF medications today (changes are bolded)  • Entresto or ACE/ARB:Valsartan 80 daily  • Beta blocker: Metoprolol SR 25 mg daily  • Diuretic:Furosemide 40 mg daily  • Aldosterone antagonist: Spironolactone 25 mg daily    Lifestyle Recommendations From Today's Visit:   • Continue to eat DASH/MED style diet.   • Limit fluid intake to 2 liters/day  • Continue to exercise as tolerated.     Significant changes to laboratory values since last visit that require repeat labs:  • Pending results from [facility]    Blood Work Ordered At Today's visit:   None    Studies Ordered at Todays Visit:  None     Follow-Up:   2 weeks (after cardiology appt)    Mirta Oliveira, PharmD      CC:  Ean Macario M.D.  No ref. provider found    Medications reconciled  Flow sheets updated

## 2021-06-18 ENCOUNTER — DOCUMENTATION (OUTPATIENT)
Dept: VASCULAR LAB | Facility: MEDICAL CENTER | Age: 51
End: 2021-06-18

## 2021-06-18 ENCOUNTER — TELEPHONE (OUTPATIENT)
Dept: CARDIOLOGY | Facility: MEDICAL CENTER | Age: 51
End: 2021-06-18

## 2021-06-18 DIAGNOSIS — I50.20 ACC/AHA STAGE C SYSTOLIC HEART FAILURE (HCC): ICD-10-CM

## 2021-06-18 DIAGNOSIS — I48.0 PAROXYSMAL ATRIAL FIBRILLATION (HCC): ICD-10-CM

## 2021-06-18 RX ORDER — FUROSEMIDE 20 MG/1
20 TABLET ORAL 2 TIMES DAILY
Qty: 14 TABLET | Refills: 0 | Status: SHIPPED
Start: 2021-06-18 | End: 2021-06-18 | Stop reason: CLARIF

## 2021-06-18 RX ORDER — AMIODARONE HYDROCHLORIDE 200 MG/1
200 TABLET ORAL DAILY
Qty: 90 TABLET | Refills: 0 | Status: SHIPPED | OUTPATIENT
Start: 2021-06-18 | End: 2021-07-01 | Stop reason: SDUPTHER

## 2021-06-18 NOTE — TELEPHONE ENCOUNTER
JG    Patient called and needs his Eliquis and Amiodarone refilled, he is almost out. Please send to pharmacy on file. He was prescribed these medications in the hospital. Please call when done.    Thank you,    Maryam ROMANO

## 2021-06-18 NOTE — ADDENDUM NOTE
Addended by: MARTINEZ ALLEN on: 6/18/2021 01:50 PM     Modules accepted: Orders     PRE-OP EVALUATION    Patient Name: Blane Christensen    Pre-op Diagnosis: Primary osteoarthritis of right hip [M16.11]    Procedure(s):  RIGHT ANTERIOR TOTAL HIP REPLACEMENT    Surgeon(s) and Role:     Maxim Javier MD - Primary    Pre-op vitals reviewed. function is moderately to markedly reduced. The     estimated ejection fraction is 30-35%. There is global hypokinesis with     akinesis of the mid to apical inferoseptum, mid to aplical anteroseptum,     and the entire apex.  Features are consistent with a Smoker      Smokeless tobacco: Never Used    Alcohol use: Yes      Frequency: Monthly or less      Drinks per session: 1 or 2      Binge frequency: Never      Comment: holidays      Drug use: Unknown     Available pre-op labs reviewed.   Lab Results   [place]

## 2021-06-18 NOTE — TELEPHONE ENCOUNTER
VF Cardiac cardiac arrest S/P AICD; Paroxymal Afib  -meditronic dual chamber AICD 5/18/2021  -Device interrogated today showing normal function.  -continue amiodarone 200 mg daily   -Eliquis 5 mg BID  -FU with EP on 6/24/2021

## 2021-06-24 ENCOUNTER — OFFICE VISIT (OUTPATIENT)
Dept: CARDIOLOGY | Facility: MEDICAL CENTER | Age: 51
End: 2021-06-24
Payer: COMMERCIAL

## 2021-06-24 ENCOUNTER — TELEPHONE (OUTPATIENT)
Dept: CARDIOLOGY | Facility: MEDICAL CENTER | Age: 51
End: 2021-06-24

## 2021-06-24 VITALS
DIASTOLIC BLOOD PRESSURE: 82 MMHG | OXYGEN SATURATION: 96 % | WEIGHT: 238 LBS | HEART RATE: 80 BPM | SYSTOLIC BLOOD PRESSURE: 110 MMHG | BODY MASS INDEX: 32.23 KG/M2 | RESPIRATION RATE: 18 BRPM | HEIGHT: 72 IN

## 2021-06-24 DIAGNOSIS — Z95.810 ICD (IMPLANTABLE CARDIOVERTER-DEFIBRILLATOR), DUAL, IN SITU: ICD-10-CM

## 2021-06-24 DIAGNOSIS — I48.91 ATRIAL FIBRILLATION, UNSPECIFIED TYPE (HCC): ICD-10-CM

## 2021-06-24 DIAGNOSIS — I50.21 ACUTE SYSTOLIC HEART FAILURE (HCC): ICD-10-CM

## 2021-06-24 DIAGNOSIS — I49.01 VENTRICULAR FIBRILLATION (HCC): ICD-10-CM

## 2021-06-24 DIAGNOSIS — G47.33 OSA (OBSTRUCTIVE SLEEP APNEA): ICD-10-CM

## 2021-06-24 PROCEDURE — 93000 ELECTROCARDIOGRAM COMPLETE: CPT | Performed by: INTERNAL MEDICINE

## 2021-06-24 PROCEDURE — 99214 OFFICE O/P EST MOD 30 MIN: CPT | Mod: 24 | Performed by: NURSE PRACTITIONER

## 2021-06-24 ASSESSMENT — ENCOUNTER SYMPTOMS
FEVER: 0
DIZZINESS: 0
LOSS OF CONSCIOUSNESS: 0
SENSORY CHANGE: 0
PALPITATIONS: 0
COUGH: 0
ABDOMINAL PAIN: 0
SORE THROAT: 0
DIARRHEA: 0
SHORTNESS OF BREATH: 0
CHILLS: 0
ORTHOPNEA: 0
WHEEZING: 0
STRIDOR: 0
PND: 0
VOMITING: 0
HEARTBURN: 0
NAUSEA: 0
SPUTUM PRODUCTION: 0
TINGLING: 0
HEADACHES: 0
FOCAL WEAKNESS: 0
HEMOPTYSIS: 0
BLOOD IN STOOL: 0
WEIGHT LOSS: 0
TREMORS: 0
SPEECH CHANGE: 0

## 2021-06-24 ASSESSMENT — FIBROSIS 4 INDEX: FIB4 SCORE: 0.85

## 2021-06-24 NOTE — PROGRESS NOTES
Cardiology/Electrophysiology Follow-up Note      Subjective:   Chief Complaint:   Chief Complaint   Patient presents with   • Atrial Fibrillation       Ross Blake is a 50 y.o. male who presents today for hospital follow-up following V. fib cardiac arrest.  Patient was admitted on 5/15/2021 for exertional dyspnea and lower extremity edema.  He was found to be in A. fib with RVR.  Patient had witnessed V. fib cardiac arrest while admitted to [facility] he was transferred to [facility].  A bedside echocardiogram revealed an ejection fraction of approximately 35%.  He underwent coronary angiogram on 5/17/2021 which showed nonobstructive coronary disease.  He underwent Medtronic secondary prevention ICD placement 5/18/2021 by Dr. Ibrahima Torres.    Etiology of cardiomyopathy not entirely clear during hospital stay, suspected secondary to increased alcohol use/binge drinking on the weekends, potentially  tachycardia mediated as he was found to be in A. fib with RVR upon admission.  Upon questioning today he reports that his heart rate was elevated at least a couple weeks prior to admission, in addition to this he had ongoing swelling of his lower extremities for some time.    He is followed by the heart failure clinic in EP clinic.  Past medical history also significant for history of vein stripping for venous stasis, obstructive sleep apnea for which he was recommended to start CPAP therapy.    Today in follow up he states that he is overall been feeling great since leaving the hospital.  He reports he has been compliant with lifestyle modifications and his medications.  He states no drinking has been taking his vital signs and weight measurements and recording daily.  He currently Denies chest pain, dizziness, palpitations, pre syncope or syncope, dyspnea, PND, orthopnea, or lower extremity edema.        Last office visit he saw Tracey Isidro was started on Farxiga and referred for pharmacotherapy.   Has follow-up with both of those clinics next week.  Planned for repeat echocardiogram in approximately 2 months with plan for cardiac MRI if ejection fraction fails to respond to medical therapy.      Patient endorses medication compliance        History reviewed. No pertinent past medical history.  History reviewed. No pertinent surgical history.  History reviewed. No pertinent family history.  Social History     Socioeconomic History   • Marital status:      Spouse name: Not on file   • Number of children: Not on file   • Years of education: Not on file   • Highest education level: Not on file   Occupational History   • Not on file   Tobacco Use   • Smoking status: Former Smoker     Types: Cigarettes   • Smokeless tobacco: Never Used   Vaping Use   • Vaping Use: Former   • Quit date: 5/14/2021   Substance and Sexual Activity   • Alcohol use: Yes     Comment: Occasionally   • Drug use: No   • Sexual activity: Not on file   Other Topics Concern   • Not on file   Social History Narrative   • Not on file     Social Determinants of Health     Financial Resource Strain:    • Difficulty of Paying Living Expenses:    Food Insecurity:    • Worried About Running Out of Food in the Last Year:    • Ran Out of Food in the Last Year:    Transportation Needs:    • Lack of Transportation (Medical):    • Lack of Transportation (Non-Medical):    Physical Activity:    • Days of Exercise per Week:    • Minutes of Exercise per Session:    Stress:    • Feeling of Stress :    Social Connections:    • Frequency of Communication with Friends and Family:    • Frequency of Social Gatherings with Friends and Family:    • Attends [employer] Services:    • Active Member of Clubs or Organizations:    • Attends Club or Organization Meetings:    • Marital Status:    Intimate Partner Violence:    • Fear of Current or Ex-Partner:    • Emotionally Abused:    • Physically Abused:    • Sexually Abused:      No Known Allergies    Current  Outpatient Medications   Medication Sig Dispense Refill   • apixaban (ELIQUIS) 5mg Tab Take 1 tablet by mouth 2 times a day. Indications: Thromboembolism secondary to Atrial Fibrillation 180 tablet 0   • amiodarone (CORDARONE) 200 MG Tab Take 1 tablet by mouth every day. 90 tablet 0   • spironolactone (ALDACTONE) 25 MG Tab Take 1 tablet by mouth every day. 30 tablet 2   • valsartan (DIOVAN) 80 MG Tab Take 1 tablet by mouth every day. 30 tablet 2   • metoprolol SR (TOPROL XL) 25 MG TABLET SR 24 HR Take 1 tablet by mouth every day. 30 tablet 2   • furosemide (LASIX) 40 MG Tab Take 1 tablet by mouth every day. 30 tablet 2   • Dapagliflozin Propanediol 10 MG Tab Take 10 mg by mouth every day. 30 tablet 11   • potassium chloride SA (K-DUR) 10 MEQ Tab CR Take 1 tablet by mouth every day. 30 tablet 11   • thiamine (THIAMINE) 100 MG tablet Take 1 tablet by mouth every day. 30 tablet 0   • Ascorbic Acid (VITAMIN C GUMMIE PO) Take 3-4 Tablets by mouth every day.     • multivitamin-iron-minerals-folic acid (CENTRUM) chewable tablet Chew 2 Tablets every day.     • folic acid (FOLVITE) 1 MG Tab Take 1 tablet by mouth every day. (Patient not taking: Reported on 6/24/2021) 30 tablet 0     No current facility-administered medications for this visit.       Review of Systems   Constitutional: Negative for chills, fever, malaise/fatigue and weight loss.   HENT: Negative for congestion, sore throat and tinnitus.    Respiratory: Negative for cough, hemoptysis, sputum production, shortness of breath, wheezing and stridor.    Cardiovascular: Negative for chest pain, palpitations, orthopnea, leg swelling and PND.   Gastrointestinal: Negative for abdominal pain, blood in stool, diarrhea, heartburn, nausea and vomiting.   Musculoskeletal:        Areola tenderness    Skin: Negative for rash.   Neurological: Negative for dizziness, tingling, tremors, sensory change, speech change, focal weakness, loss of consciousness and headaches.     All  others systems reviewed and negative.     Objective:     /82 (BP Location: Left arm, Patient Position: Sitting, BP Cuff Size: Adult)   Pulse 80   Resp 18   Ht 1.829 m (6')   Wt 108 kg (238 lb)   SpO2 96%  Body mass index is 32.28 kg/m².    Weight/BMI: Body mass index is 32.28 kg/m².  Wt Readings from Last 4 Encounters:   06/24/21 108 kg (238 lb)   05/27/21 114 kg (252 lb)   05/20/21 122 kg (268 lb 8.3 oz)   05/14/21 (!) 131 kg (288 lb 12.8 oz)         Physical Exam  HENT:      Head: Normocephalic and atraumatic.   Eyes:      Conjunctiva/sclera: Conjunctivae normal.      Pupils: Pupils are equal, round, and reactive to light.   Neck:      Vascular: No JVD.      Trachea: No tracheal deviation.   Cardiovascular:      Rate and Rhythm: Normal rate and regular rhythm.      Pulses:           Radial pulses are 2+ on the right side and 2+ on the left side.        Dorsalis pedis pulses are 2+ on the right side and 2+ on the left side.        Posterior tibial pulses are 2+ on the right side and 2+ on the left side.      Heart sounds: Normal heart sounds. No murmur heard.   No friction rub. No gallop.    Pulmonary:      Effort: Pulmonary effort is normal. No respiratory distress.      Breath sounds: Normal breath sounds. No wheezing or rales.   Chest:      Chest wall: No tenderness.      Comments: Left chest ICD is well healed; no significant edema or erythema.   Musculoskeletal:         General: Normal range of motion.      Cervical back: Normal range of motion and neck supple.      Right lower leg: Edema (generalized ) present.      Left lower leg: Edema (generalized ) present.   Skin:     General: Skin is warm and dry.   Neurological:      Mental Status: He is alert and oriented to person, place, and time.   Psychiatric:         Mood and Affect: Mood and affect normal.         Cognition and Memory: Memory normal.         Judgment: Judgment normal.           Cardiac Imaging and Procedures Review:    EKG (6/24/21)  reviewed by myself:   Bianca ursula     5/14/21  CONCLUSIONS  Normal left ventricular chamber size. Moderately reduced left   ventricular systolic function. Left ventricular ejection fraction is   visually estimated to be 35%.  Normal right ventricular size and systolic function.  Mild to moderate mitral regurgitation.  Estimated right ventricular systolic pressure is 45 mmHg; mild   pulmonary hypertension.  Right atrial pressure is estimated to be 15 mmHg.  No pericardial effusion seen.     Cardiac Catheterization:   5/17/2021  AORTIC VALVE:  There is no significant gradient noted.  LEFT VENTRICULOGRAM:  A 10 mL of contrast were delivered for 3 seconds.    Ejection fraction was estimated to be 25%.  There was global hypokinesis noted   at angiogram.  LEFT MAIN CORONARY ARTERY:  Left main coronary artery is a long, large-caliber   vessel free of disease.  LEFT ANTERIOR DESCENDING ARTERY:  Left anterior descending artery is a long   vessel, which wraps around the apex.  It originates as a very large caliber   vessel and tapers to small caliber at the apex.  There are moderate to small   caliber diagonal branches noted.  RAMUS INTERMEDIUS:  Ramus intermedius is a large bifurcating vessel free of   disease.  LEFT CIRCUMFLEX ARTERY:  Left circumflex artery is a nondominant moderate   caliber vessel with small obtuse marginal branches.  Left circumflex artery   and its branches are free of disease.  RIGHT CORONARY ARTERY:  Right coronary artery is a dominant large caliber   vessel with long large-caliber posterior descending artery, which wraps the   apex as well as a moderate to large caliber posterolateral branch.  Right   coronary artery and its branches are free of disease.  IMPRESSION:  1.  No angiographic evidence of coronary artery disease.  2.  Reduced left ventricular systolic function with ejection fraction 25%.  3.  Elevated left ventricular end-diastolic pressure.     EP Procedures:  5/18/21  IMPLANTED DEVICE  INFORMATION:    Pulse generator is a Medtronic model [id number]   Serial number [id number]   LEAD INFORMATION:  1. Right atrial lead is a Medtronic model 5076-52, serial number [id number], P wave 1 millivolts, threshold 1.5 volts, pacing impedance 646 ohms.  2. Right ventricular lead is a Medtronic model 9540R90, serial number [id number], R wave 8 millivolts, threshold 0.75 volts , pacing impedance 627 ohms.   DEVICE PROGRAMMING:    Ivan therapy: AAI to DD  Tachy therapy:  Two zone lowest at 210 BPM  DEFIBRILLATION THRESHOLD TESTING:    DFT = 30 Joules  Charge time = 6.1 seconds  Shock impedance = 71 ohms       Labs (personally reviewed and notable for):   Lab Results   Component Value Date/Time    SODIUM 134 (L) 05/20/2021 05:28 AM    POTASSIUM 4.0 05/20/2021 05:28 AM    CHLORIDE 97 05/20/2021 05:28 AM    CO2 28 05/20/2021 05:28 AM    GLUCOSE 120 (H) 05/20/2021 05:28 AM    BUN 22 05/20/2021 05:28 AM    CREATININE 1.44 (H) 05/20/2021 05:28 AM      Lab Results   Component Value Date/Time    WBC 13.5 (H) 05/20/2021 05:28 AM    RBC 4.31 (L) 05/20/2021 05:28 AM    HEMOGLOBIN 14.7 05/20/2021 05:28 AM    HEMATOCRIT 43.7 05/20/2021 05:28 AM    .4 (H) 05/20/2021 05:28 AM    MCH 34.1 (H) 05/20/2021 05:28 AM    MCHC 33.6 (L) 05/20/2021 05:28 AM    MPV 10.0 05/20/2021 05:28 AM    NEUTSPOLYS 80.30 (H) 05/20/2021 05:28 AM    LYMPHOCYTES 4.80 (L) 05/20/2021 05:28 AM    MONOCYTES 12.50 05/20/2021 05:28 AM    EOSINOPHILS 1.40 05/20/2021 05:28 AM    BASOPHILS 0.40 05/20/2021 05:28 AM      PT/INR:   Lab Results   Component Value Date/Time    PROTHROMBTM 16.3 (H) 05/17/2021 03:20 AM    INR 1.27 (H) 05/17/2021 03:20 AM       Assessment:     1. Ventricular fibrillation (HCC)     2. Atrial fibrillation, unspecified type (HCC)  EKG   3. ICD (implantable cardioverter-defibrillator), dual, in situ Medtronic Chrome placed 5/18/21     4. Acute systolic heart failure (HCC)     5. MANSOOR (obstructive sleep apnea)         Medical Decision  Making:  Today's Assessment / Status / Plan:   1.  Ventricular fibrillation:  -Status post in-hospital cardiac arrest with V. hebert.  Found to have acute systolic heart failure with EF of approximately 35%.  Cardiac arrest may have been secondary to increased alcoholism with especially weekend binge drinking versus tachycardia induced cardiomyopathy from found A. fib with RVR versus other etiology.  -He underwent secondary prevention Medtronic ICD placement 5/18/2021.  -He remains in sinus rhythm without further atrial or ventricular rhythms logged into ICD.  -He remains on low-dose p.o. amnio 200 mg daily.  Would recommend continuing this dose for now.  -Discussed role/indication of genetic testing with him.  He would like to proceed.  EP note RN to provide information and genetic testing.  We will call him with results and further information/recommendations.    2.  Atrial fibrillation:  -No further A. fib at this time.  Continue low-dose amiodarone.  Continue Eliquis.    3 Medtronic ICD:  -I have interrogated device in office today and is found to be working normally with stable lead function.  -No device therapies, no arrhythmias.  -Battery longevity is 12.5 years.  Minimal pacing.    #4 acute systolic heart failure:  -Followed by [facility] heart failure clinic.  Has appointment to see Tracey Montoya next week for follow-up.  -He is on appropriate GDMT and tolerating well.  He has been logging his vital signs and weights daily.  I have encouraged him to keep this up.  -Plan for repeat echocardiogram in 2 or 3 months.  If function does not improve consideration for cardiac MRI.    5 obstructive sleep apnea:  -Previous diagnosis of this I have encouraged him to be compliant with CPAP therapy.      Plan reviewed in detail with the patient and he verbalizes understanding and is in agreement.   RTC as scheduled with heart failure clinic and in 3 months for device interrogaiton, sooner if clinical condition  changes      Please note that this dictation was created using voice recognition software. I have made every reasonable attempt to correct obvious errors, but I expect that there are errors of grammar and possibly content that I did not discover before finalizing the note.    NGUYEN Guerin.

## 2021-06-28 ENCOUNTER — TELEPHONE (OUTPATIENT)
Dept: CARDIOLOGY | Facility: MEDICAL CENTER | Age: 51
End: 2021-06-28

## 2021-06-28 NOTE — TELEPHONE ENCOUNTER
Chart Prep      Tried calling to see if pt has done lab work yet for the BMP but no answer. LVM reminding their appt date and time and a call back number at [phone].

## 2021-07-01 ENCOUNTER — OFFICE VISIT (OUTPATIENT)
Dept: CARDIOLOGY | Facility: MEDICAL CENTER | Age: 51
End: 2021-07-01
Payer: COMMERCIAL

## 2021-07-01 VITALS
SYSTOLIC BLOOD PRESSURE: 118 MMHG | BODY MASS INDEX: 32.25 KG/M2 | HEIGHT: 72 IN | OXYGEN SATURATION: 94 % | WEIGHT: 238.1 LBS | HEART RATE: 74 BPM | DIASTOLIC BLOOD PRESSURE: 74 MMHG | RESPIRATION RATE: 14 BRPM

## 2021-07-01 DIAGNOSIS — I50.20 ACC/AHA STAGE C SYSTOLIC HEART FAILURE (HCC): ICD-10-CM

## 2021-07-01 DIAGNOSIS — G47.33 OSA (OBSTRUCTIVE SLEEP APNEA): ICD-10-CM

## 2021-07-01 DIAGNOSIS — I10 ESSENTIAL HYPERTENSION, BENIGN: ICD-10-CM

## 2021-07-01 DIAGNOSIS — I47.20 VENTRICULAR TACHYARRHYTHMIA (HCC): ICD-10-CM

## 2021-07-01 DIAGNOSIS — Z79.899 HIGH RISK MEDICATION USE: ICD-10-CM

## 2021-07-01 DIAGNOSIS — I50.21 ACUTE SYSTOLIC HEART FAILURE (HCC): ICD-10-CM

## 2021-07-01 DIAGNOSIS — I48.91 ATRIAL FIBRILLATION, UNSPECIFIED TYPE (HCC): ICD-10-CM

## 2021-07-01 DIAGNOSIS — Z95.810 ICD (IMPLANTABLE CARDIOVERTER-DEFIBRILLATOR), DUAL, IN SITU: ICD-10-CM

## 2021-07-01 DIAGNOSIS — I48.0 PAROXYSMAL ATRIAL FIBRILLATION (HCC): ICD-10-CM

## 2021-07-01 LAB — EKG IMPRESSION: NORMAL

## 2021-07-01 PROCEDURE — 99214 OFFICE O/P EST MOD 30 MIN: CPT | Mod: 24 | Performed by: NURSE PRACTITIONER

## 2021-07-01 RX ORDER — METOPROLOL SUCCINATE 25 MG/1
25 TABLET, EXTENDED RELEASE ORAL DAILY
Qty: 100 TABLET | Refills: 2 | Status: SHIPPED
Start: 2021-07-01 | End: 2021-07-15

## 2021-07-01 RX ORDER — FUROSEMIDE 40 MG/1
40 TABLET ORAL DAILY
Qty: 100 TABLET | Refills: 2 | Status: SHIPPED | OUTPATIENT
Start: 2021-07-01 | End: 2021-09-23

## 2021-07-01 RX ORDER — VALSARTAN 80 MG/1
80 TABLET ORAL DAILY
Qty: 100 TABLET | Refills: 2 | Status: SHIPPED | OUTPATIENT
Start: 2021-07-01 | End: 2021-09-23

## 2021-07-01 RX ORDER — AMIODARONE HYDROCHLORIDE 200 MG/1
200 TABLET ORAL DAILY
Qty: 100 TABLET | Refills: 2 | Status: SHIPPED
Start: 2021-07-01 | End: 2021-12-21

## 2021-07-01 ASSESSMENT — ENCOUNTER SYMPTOMS
DIZZINESS: 0
BLURRED VISION: 0
NAUSEA: 0
CLAUDICATION: 0
VOMITING: 0
PALPITATIONS: 0
SHORTNESS OF BREATH: 0
BRUISES/BLEEDS EASILY: 0
ORTHOPNEA: 0
BLOOD IN STOOL: 0
ABDOMINAL PAIN: 0
FALLS: 0
WEIGHT LOSS: 0
MYALGIAS: 0
COUGH: 0
FEVER: 0
TINGLING: 0
LOSS OF CONSCIOUSNESS: 0
PND: 0

## 2021-07-01 ASSESSMENT — FIBROSIS 4 INDEX: FIB4 SCORE: 0.85

## 2021-07-01 NOTE — PROGRESS NOTES
"Chief Complaint   Patient presents with   • Congestive Heart Failure       Subjective:   Ross Blake is a 50 y.o. male who presents today for heart failure follow-up with his wife, Estefany.  Patient was last seen on 5/27/2021.  Patient was also seen by Justa Reynolds in electrophysiology 6/24/2021.  Patient is also followed by pharmacotherapy clinic.      Patient was admitted on 5/15/2021 for TURK and lower extremity edema.  Patient had an episode of V. fib/cardiac arrest while admitted on 5/15/2021.  Patient underwent coronary angiogram on 5/17 which showed nonobstructive coronary artery disease.  Patient underwent AICD placement on 5/18/2021.  Patient was also discovered to have newly reduced ejection fraction at 35%.    Patient reports past medical history of venous stasis with history of vein stripping and MANSOOR.  Patient reports a history of alcohol use.    Today, patient denies chest pain, shortness of breath, palpitations, dizziness/lightheadedness, orthopnea, PND or Edema.  Patient reports he continues to feel improved and is increasing his activities of daily living as tolerated.  Patient notes he hiked 3 miles with a steep uphill climb and he did not experience TURK.  Patient has 1 more week until he is cleared to swim by EP.  He received his genetic testing kit he will complete the testing and sent out on Monday.  Patient notes his son is also being worked up by pediatric cardiologist for tachyarrhythmia.     Patient reports \" nipple tenderness;\" we discussed gynecomastia as a side effect from spironolactone.  Patient's potassium elevated today we will hold spironolactone and consider eprelenone in the future.    Patient reports he has lost 40 pounds overall since his hospitalization.  His current weight at home is 231.2 pounds he has lost approximately 6 pounds since his last appointment. Based on physical examination findings, patient is euvolemic. No JVD, lungs are clear to auscultation, no pitting edema " in bilateral lower extremities, no ascites. Dry weight is 231 lbs.    Patient reports continued EtOH cessation.  Follow-up echo ordered for 3 months of GDMT.  Medications titrated per below.  Patient verbalizes understanding.    History reviewed. No pertinent past medical history.  History reviewed. No pertinent surgical history.  History reviewed. No pertinent family history.  Social History     Socioeconomic History   • Marital status:      Spouse name: Not on file   • Number of children: Not on file   • Years of education: Not on file   • Highest education level: Not on file   Occupational History   • Not on file   Tobacco Use   • Smoking status: Former Smoker     Types: Cigarettes   • Smokeless tobacco: Never Used   Vaping Use   • Vaping Use: Former   • Quit date: 5/14/2021   Substance and Sexual Activity   • Alcohol use: Yes     Comment: Occasionally   • Drug use: No   • Sexual activity: Not on file   Other Topics Concern   • Not on file   Social History Narrative   • Not on file     Social Determinants of Health     Financial Resource Strain:    • Difficulty of Paying Living Expenses:    Food Insecurity:    • Worried About Running Out of Food in the Last Year:    • Ran Out of Food in the Last Year:    Transportation Needs:    • Lack of Transportation (Medical):    • Lack of Transportation (Non-Medical):    Physical Activity:    • Days of Exercise per Week:    • Minutes of Exercise per Session:    Stress:    • Feeling of Stress :    Social Connections:    • Frequency of Communication with Friends and Family:    • Frequency of Social Gatherings with Friends and Family:    • Attends [employer] Services:    • Active Member of Clubs or Organizations:    • Attends Club or Organization Meetings:    • Marital Status:    Intimate Partner Violence:    • Fear of Current or Ex-Partner:    • Emotionally Abused:    • Physically Abused:    • Sexually Abused:      No Known Allergies  Outpatient Encounter Medications as  of 7/1/2021   Medication Sig Dispense Refill   • amiodarone (CORDARONE) 200 MG Tab Take 1 tablet by mouth every day. 100 tablet 2   • apixaban (ELIQUIS) 5mg Tab Take 1 tablet by mouth 2 times a day. Indications: Thromboembolism secondary to Atrial Fibrillation 180 tablet 2   • furosemide (LASIX) 40 MG Tab Take 1 tablet by mouth every day. 100 tablet 2   • metoprolol SR (TOPROL XL) 25 MG TABLET SR 24 HR Take 1 tablet by mouth every day. 100 tablet 2   • valsartan (DIOVAN) 80 MG Tab Take 1 tablet by mouth every day. 100 tablet 2   • Dapagliflozin Propanediol 10 MG Tab Take 10 mg by mouth every day. 30 tablet 11   • thiamine (THIAMINE) 100 MG tablet Take 1 tablet by mouth every day. 30 tablet 0   • Ascorbic Acid (VITAMIN C GUMMIE PO) Take 3-4 Tablets by mouth every day.     • multivitamin-iron-minerals-folic acid (CENTRUM) chewable tablet Chew 2 Tablets every day.     • [DISCONTINUED] apixaban (ELIQUIS) 5mg Tab Take 1 tablet by mouth 2 times a day. Indications: Thromboembolism secondary to Atrial Fibrillation 180 tablet 0   • [DISCONTINUED] amiodarone (CORDARONE) 200 MG Tab Take 1 tablet by mouth every day. 90 tablet 0   • [DISCONTINUED] spironolactone (ALDACTONE) 25 MG Tab Take 1 tablet by mouth every day. 30 tablet 2   • [DISCONTINUED] valsartan (DIOVAN) 80 MG Tab Take 1 tablet by mouth every day. 30 tablet 2   • [DISCONTINUED] metoprolol SR (TOPROL XL) 25 MG TABLET SR 24 HR Take 1 tablet by mouth every day. 30 tablet 2   • [DISCONTINUED] furosemide (LASIX) 40 MG Tab Take 1 tablet by mouth every day. 30 tablet 2   • [DISCONTINUED] folic acid (FOLVITE) 1 MG Tab Take 1 tablet by mouth every day. (Patient not taking: Reported on 6/24/2021) 30 tablet 0   • [DISCONTINUED] potassium chloride SA (K-DUR) 10 MEQ Tab CR Take 1 tablet by mouth every day. 30 tablet 11     No facility-administered encounter medications on file as of 7/1/2021.     Review of Systems   Constitutional: Negative for fever, malaise/fatigue and weight  loss.   Eyes: Negative for blurred vision.   Respiratory: Negative for cough and shortness of breath.    Cardiovascular: Negative for chest pain, palpitations, orthopnea, claudication, leg swelling and PND.        +gynomastia   Gastrointestinal: Negative for abdominal pain, blood in stool, nausea and vomiting.   Genitourinary: Negative for dysuria, frequency and hematuria.   Musculoskeletal: Negative for falls and myalgias.   Neurological: Negative for dizziness, tingling and loss of consciousness.   Endo/Heme/Allergies: Does not bruise/bleed easily.        Objective:   /74 (BP Location: Left arm, Patient Position: Sitting, BP Cuff Size: Adult)   Pulse 74   Resp 14   Ht 1.829 m (6')   Wt 108 kg (238 lb 1.6 oz)   SpO2 94%   BMI 32.29 kg/m²     Physical Exam   Constitutional: He is oriented to person, place, and time. He appears well-developed. No distress.   HENT:   Head: Normocephalic and atraumatic.   Eyes: Pupils are equal, round, and reactive to light.   Neck: No JVD present.   Cardiovascular: Normal rate, regular rhythm, normal heart sounds and normal pulses. Exam reveals no gallop and no friction rub.   No murmur heard.  ICD site CDI, no erythema or swelling     Pulmonary/Chest: Effort normal and breath sounds normal. No respiratory distress.   Abdominal: Soft. Bowel sounds are normal. He exhibits no distension.   Musculoskeletal:      Right lower leg: No edema.      Left lower leg: No edema.   Neurological: He is alert and oriented to person, place, and time.   Skin: Skin is warm and dry. No erythema.   Psychiatric: His behavior is normal. Mood normal.   Vitals reviewed.      Assessment:     1. ACC/AHA stage C systolic heart failure (HCC)  EC-ECHOCARDIOGRAM COMPLETE W/O CONT   2. Ventricular tachyarrhythmia (HCC)  EC-ECHOCARDIOGRAM COMPLETE W/O CONT   3. ICD (implantable cardioverter-defibrillator), dual, in situ Med[employer] Chrome placed 5/18/21  EC-ECHOCARDIOGRAM COMPLETE W/O CONT   4. Atrial  fibrillation, unspecified type (HCC)  EC-ECHOCARDIOGRAM COMPLETE W/O CONT   5. MANSOOR (obstructive sleep apnea)  EC-ECHOCARDIOGRAM COMPLETE W/O CONT   6. Paroxysmal atrial fibrillation (HCC)  EC-ECHOCARDIOGRAM COMPLETE W/O CONT    amiodarone (CORDARONE) 200 MG Tab    apixaban (ELIQUIS) 5mg Tab   7. Essential hypertension, benign  EC-ECHOCARDIOGRAM COMPLETE W/O CONT   8. High risk medication use  EC-ECHOCARDIOGRAM COMPLETE W/O CONT   9. Acute systolic heart failure (HCC)  furosemide (LASIX) 40 MG Tab    valsartan (DIOVAN) 80 MG Tab     No results found for: CHOLSTRLTOT, LDL, HDL, TRIGLYCERIDE    Lab Results   Component Value Date/Time    SODIUM 134 (L) 05/20/2021 05:28 AM    POTASSIUM 4.0 05/20/2021 05:28 AM    CHLORIDE 97 05/20/2021 05:28 AM    CO2 28 05/20/2021 05:28 AM    GLUCOSE 120 (H) 05/20/2021 05:28 AM    BUN 22 05/20/2021 05:28 AM    CREATININE 1.44 (H) 05/20/2021 05:28 AM     Lab Results   Component Value Date/Time    ALKPHOSPHAT 69 05/20/2021 05:28 AM    ASTSGOT 15 05/20/2021 05:28 AM    ALTSGPT 20 05/20/2021 05:28 AM    TBILIRUBIN 0.7 05/20/2021 05:28 AM       Ref. Range 5/18/2021 05:30   NT-proBNP Latest Ref Range: 0 - 125 pg/mL 910 (H)     Echocardiogram:  5/14/21  CONCLUSIONS  Normal left ventricular chamber size. Moderately reduced left   ventricular systolic function. Left ventricular ejection fraction is   visually estimated to be 35%.  Normal right ventricular size and systolic function.  Mild to moderate mitral regurgitation.  Estimated right ventricular systolic pressure is 45 mmHg; mild   pulmonary hypertension.  Right atrial pressure is estimated to be 15 mmHg.  No pericardial effusion seen.     Cardiac Catheterization:   5/17/2021  AORTIC VALVE:  There is no significant gradient noted.     LEFT VENTRICULOGRAM:  A 10 mL of contrast were delivered for 3 seconds.    Ejection fraction was estimated to be 25%.  There was global hypokinesis noted   at angiogram.     LEFT MAIN CORONARY ARTERY:  Left  main coronary artery is a long, large-caliber   vessel free of disease.     LEFT ANTERIOR DESCENDING ARTERY:  Left anterior descending artery is a long   vessel, which wraps around the apex.  It originates as a very large caliber   vessel and tapers to small caliber at the apex.  There are moderate to small   caliber diagonal branches noted.     RAMUS INTERMEDIUS:  Ramus intermedius is a large bifurcating vessel free of   disease.     LEFT CIRCUMFLEX ARTERY:  Left circumflex artery is a nondominant moderate   caliber vessel with small obtuse marginal branches.  Left circumflex artery   and its branches are free of disease.     RIGHT CORONARY ARTERY:  Right coronary artery is a dominant large caliber   vessel with long large-caliber posterior descending artery, which wraps the   apex as well as a moderate to large caliber posterolateral branch.  Right   coronary artery and its branches are free of disease.     IMPRESSION:  1.  No angiographic evidence of coronary artery disease.  2.  Reduced left ventricular systolic function with ejection fraction 25%.  3.  Elevated left ventricular end-diastolic pressure.    MDT Device Interrogation (5/19/2021):  RA: p waves 3.0 mV, Impedance 494 ohms, Threshold 0.25 V  RV: r waves 8.1 mV, Impedance 532 ohms, Threshold, 0.5 V, HV Impedance 63 ohms.     Chest X-Ray (5/19/21):   1.  Pulmonary edema and/or infiltrates are identified, which are stable since the prior exam.  2.  Cardiomegaly    EKG (6/24/2021): Personally interpreted by me as sinus rhythm    Medical Decision Making:  Today's Assessment / Status / Plan:   HFrEF, Stage C, Class II, LVEF 35%  -Heart failure due to non-ischemic cardiomyopathy toxin VS rate-related  -Continue valsartan 80 mg BID.  Discussed Entresto; patient would like to defer until follow-up echocardiogram.  -Continue Coreg 6.25 mg BID  -Hold spironolactone due to elevated potassium as well as gynecomastia.  Consider eplerenone in the future  -Continue  farxiga 10 mg daily. Education for potential ADRs with new medication; patient verbalizes understanding.   -Continue furosemide 40 mg daily.   -Labs: Reviewed and notable for potassium 5.0.  -AICD placed 5/18/2021 for secondary prevention  -Follow-up echo ordered. Consider Cardiac MRI if EF does not improve after 3 months GDMT; EP recommended genetic testing.  -Reinforced s/sx of worsening heart failure with patient and weight monitoring. Pt verbalizes understanding. Pt to call office if present.    -Heart Failure Education: pt to be contacted by HF nurse for further education  -Advanced care planning: Discussed previously.  Will continue to monitor    VF Cardiac cardiac arrest S/P AICD; Paroxymal Afib  -meditronic dual chamber AICD 5/18/2021  -Device interrogated on 6/24/2021 showing normal function.  No device therapies, no arrhythmias, minimal pacing.  -continue amiodarone 200 mg daily   -Eliquis 5 mg BID  -Genetic testing with [facility] per Justa Reynolds.  Patient to mail out test on Monday.    ETOH abuse  -Tx for withdrawals while admitted per chart review. Patient denies excessive etoh use. Discussed importance of cessation for heart function.     Venous Stasis  -Hx of vein stripping and ablations (x3-most recent 2020) for varicose veins post remote lower extremity trauma.  -Chronic lower extremity edema; patient reports currently baseline    MANSOOR; Mild pulmonary hypertension   -Patient reports pending sleep medicine evaluation.    FU in clinic in 1 month. Sooner if needed.    Patient verbalizes understanding and agrees with the plan of care.     Collaborating MD: Dr. Willie MD    PLEASE NOTE: This Note was created using voice recognition Software. I have made every reasonable attempt to correct obvious errors, but I expect that there are errors of grammar and possibly content that I did not discover before finalizing the note

## 2021-07-08 ENCOUNTER — HOSPITAL ENCOUNTER (OUTPATIENT)
Dept: LAB | Facility: MEDICAL CENTER | Age: 51
End: 2021-07-08
Attending: NURSE PRACTITIONER
Payer: COMMERCIAL

## 2021-07-08 DIAGNOSIS — I10 ESSENTIAL HYPERTENSION, BENIGN: ICD-10-CM

## 2021-07-08 DIAGNOSIS — I50.20 ACC/AHA STAGE C SYSTOLIC HEART FAILURE (HCC): ICD-10-CM

## 2021-07-08 DIAGNOSIS — Z79.899 HIGH RISK MEDICATION USE: ICD-10-CM

## 2021-07-08 PROCEDURE — 80048 BASIC METABOLIC PNL TOTAL CA: CPT

## 2021-07-08 PROCEDURE — 36415 COLL VENOUS BLD VENIPUNCTURE: CPT

## 2021-07-09 LAB
ANION GAP SERPL CALC-SCNC: 11 MMOL/L (ref 7–16)
BUN SERPL-MCNC: 16 MG/DL (ref 8–22)
CALCIUM SERPL-MCNC: 8.8 MG/DL (ref 8.5–10.5)
CHLORIDE SERPL-SCNC: 104 MMOL/L (ref 96–112)
CO2 SERPL-SCNC: 25 MMOL/L (ref 20–33)
CREAT SERPL-MCNC: 1.02 MG/DL (ref 0.5–1.4)
GLUCOSE SERPL-MCNC: 109 MG/DL (ref 65–99)
POTASSIUM SERPL-SCNC: 4.3 MMOL/L (ref 3.6–5.5)
SODIUM SERPL-SCNC: 140 MMOL/L (ref 135–145)

## 2021-07-15 ENCOUNTER — NON-PROVIDER VISIT (OUTPATIENT)
Dept: CARDIOLOGY | Facility: MEDICAL CENTER | Age: 51
End: 2021-07-15
Payer: COMMERCIAL

## 2021-07-15 VITALS
BODY MASS INDEX: 32.14 KG/M2 | SYSTOLIC BLOOD PRESSURE: 111 MMHG | WEIGHT: 237 LBS | HEART RATE: 78 BPM | DIASTOLIC BLOOD PRESSURE: 71 MMHG

## 2021-07-15 DIAGNOSIS — I50.20 ACC/AHA STAGE C SYSTOLIC HEART FAILURE (HCC): ICD-10-CM

## 2021-07-15 PROCEDURE — 99211 OFF/OP EST MAY X REQ PHY/QHP: CPT | Performed by: NURSE PRACTITIONER

## 2021-07-15 RX ORDER — METOPROLOL SUCCINATE 50 MG/1
50 TABLET, EXTENDED RELEASE ORAL DAILY
Qty: 100 TABLET | Refills: 2 | Status: SHIPPED | OUTPATIENT
Start: 2021-07-15 | End: 2021-08-02

## 2021-07-15 ASSESSMENT — FIBROSIS 4 INDEX: FIB4 SCORE: 0.85

## 2021-07-15 NOTE — NON-PROVIDER
CHF Pharmacotherapy visit - Visit    Date of Service: 07/15/2021    Informed written consent was given on: Unable to provide today    Ross Blake is here for CHF    HPI  Pertinent Interval History since last visit:   • Pt overall doing well, but c/o orthostatic hypotension  • Spironolactone was d/c'd d/t c/o gynecomastia     Most recent EF:  35%  On 5/14/21      Current Outpatient Medications:   •  metoprolol SR, 50 mg, Oral, DAILY  •  amiodarone, 200 mg, Oral, DAILY  •  apixaban, 5 mg, Oral, BID  •  furosemide, 40 mg, Oral, DAILY  •  valsartan, 80 mg, Oral, DAILY  •  Dapagliflozin Propanediol, 10 mg, Oral, DAILY  •  thiamine, 100 mg, Oral, DAILY  •  Ascorbic Acid (VITAMIN C GUMMIE PO), 3-4 tablet, Oral, DAILY  •  multivitamin-iron-minerals-folic acid, 2 tablet, Oral, DAILY    Current Adherence to CHF Therapies:  Complete    Current CHF Medications - including dose:   • Entresto or ACE/ARB:Valsartan 80 daily  • Beta blocker: Metoprolol SR 25 mg daily  • Diuretic:Furosemide 40 mg daily  • Aldosterone antagonist: none - spironolactone stopped d/t gynecomastia    Vitals:    07/15/21 1715   BP: 111/71   Pulse: 78       Home BP and HR:  wnl 100/70, HR 70-80    Change in weight: Stable    SOCIAL HISTORY  Social History     Tobacco Use   Smoking Status Former Smoker   • Types: Cigarettes   Smokeless Tobacco Never Used        DATA REVIEW  Lab Results   Component Value Date/Time    SODIUM 140 07/08/2021 01:29 PM    POTASSIUM 4.3 07/08/2021 01:29 PM    CHLORIDE 104 07/08/2021 01:29 PM    CO2 25 07/08/2021 01:29 PM    GLUCOSE 109 (H) 07/08/2021 01:29 PM    BUN 16 07/08/2021 01:29 PM    CREATININE 1.02 07/08/2021 01:29 PM     Lab Results   Component Value Date/Time    ALKPHOSPHAT 69 05/20/2021 05:28 AM    ASTSGOT 15 05/20/2021 05:28 AM    ALTSGPT 20 05/20/2021 05:28 AM    TBILIRUBIN 0.7 05/20/2021 05:28 AM    INR 1.27 (H) 05/17/2021 03:20 AM    ALBUMIN 3.0 (L) 05/20/2021 05:28 AM        Renal function:  Calculated  creatinine clearance:>100ml/min    Other Pertinent Blood Work:     Other:  Immunization History   Administered Date(s) Administered   • Influenza (IM) Preservative Free - HISTORICAL DATA 10/18/2016, 12/07/2017   • Influenza Seasonal Injectable - Historical Data 09/11/2014   • Influenza Vaccine Quad Inj (Pf) 10/10/2018   • Influenza, Unspecified - HISTORICAL DATA 11/11/2019   • Pfizer SARS-CoV-2 Vaccine 04/23/2021     Up to date on pneumococcal vaccine? Pt was offerred PPSV23 during admission, but administration was deferred    Recent Imaging Studies:    None since last visit      ASSESSMENT AND PLAN  • Pt has been doing well. BP and HR wnl    Resulting CHF medications today (changes are bolded)  • Entresto or ACE/ARB:Valsartan 80 daily  • Beta blocker: Increase metoprolol to 50 mg daily  • Diuretic:Furosemide 40 mg daily  • Aldosterone antagonist: continue holding spironolactone    Lifestyle Recommendations From Today's Visit:   • Continue to limit Na+ intake to 2gm/daily  • Limit fluid intake to 2 liters/day  • Continue to exercise as tolerated.     Blood Work Ordered At Today's visit:   None    Studies Ordered at Todays Visit:  None     Follow-Up:   4 weeks     Mirta Oliveira, PharmD      CC:  Ean Macario M.D.  No ref. provider found    Medications reconciled  Flow sheets updated

## 2021-07-27 ENCOUNTER — TELEPHONE (OUTPATIENT)
Dept: CARDIOLOGY | Facility: MEDICAL CENTER | Age: 51
End: 2021-07-27

## 2021-07-28 ENCOUNTER — TELEPHONE (OUTPATIENT)
Dept: CARDIOLOGY | Facility: MEDICAL CENTER | Age: 51
End: 2021-07-28

## 2021-07-28 NOTE — TELEPHONE ENCOUNTER
Discussed genetic testing results with Mr Blake.  Recommended consideration for consultation with genetic counselor.  Reviewed how to schedule appointment with genetic counselor through [facility].   Keep follow up as scheduled next week with Tracey Murrell.

## 2021-08-02 ENCOUNTER — OFFICE VISIT (OUTPATIENT)
Dept: CARDIOLOGY | Facility: MEDICAL CENTER | Age: 51
End: 2021-08-02
Payer: COMMERCIAL

## 2021-08-02 VITALS
OXYGEN SATURATION: 94 % | HEIGHT: 72 IN | SYSTOLIC BLOOD PRESSURE: 112 MMHG | WEIGHT: 237 LBS | BODY MASS INDEX: 32.1 KG/M2 | HEART RATE: 87 BPM | DIASTOLIC BLOOD PRESSURE: 72 MMHG

## 2021-08-02 DIAGNOSIS — I50.20 ACC/AHA STAGE C SYSTOLIC HEART FAILURE (HCC): ICD-10-CM

## 2021-08-02 DIAGNOSIS — I10 ESSENTIAL HYPERTENSION, BENIGN: ICD-10-CM

## 2021-08-02 DIAGNOSIS — I50.21 ACUTE SYSTOLIC HEART FAILURE (HCC): ICD-10-CM

## 2021-08-02 DIAGNOSIS — Z79.899 HIGH RISK MEDICATION USE: ICD-10-CM

## 2021-08-02 DIAGNOSIS — I49.01 VENTRICULAR FIBRILLATION (HCC): ICD-10-CM

## 2021-08-02 PROCEDURE — 99214 OFFICE O/P EST MOD 30 MIN: CPT | Performed by: NURSE PRACTITIONER

## 2021-08-02 RX ORDER — METOPROLOL SUCCINATE 25 MG/1
25 TABLET, EXTENDED RELEASE ORAL DAILY
Qty: 30 TABLET | Refills: 5 | Status: SHIPPED | OUTPATIENT
Start: 2021-08-02 | End: 2021-10-10 | Stop reason: SDUPTHER

## 2021-08-02 RX ORDER — EPLERENONE 25 MG/1
12.5 TABLET, FILM COATED ORAL EVERY MORNING
Qty: 45 TABLET | Refills: 3 | Status: SHIPPED | OUTPATIENT
Start: 2021-08-02 | End: 2021-11-12 | Stop reason: SDUPTHER

## 2021-08-02 ASSESSMENT — MINNESOTA LIVING WITH HEART FAILURE QUESTIONNAIRE (MLHF)
DIFFICULTY SOCIALIZING WITH FAMILY OR FRIENDS: 1
HAVING TO SIT OR LIE DOWN DURING THE DAY: 0
MAKING YOU SHORT OF BREATH: 0
SWELLING IN ANKLES OR LEGS: 2
DIFFICULTY TO CONCENTRATE OR REMEMBERING THINGS: 1
FEELING LIKE A BURDEN TO FAMILY AND FRIENDS: 0
DIFFICULTY GOING AWAY FROM HOME: 0
GIVING YOU SIDE EFFECTS FROM TREATMENTS: 2
COSTING YOU MONEY FOR MEDICAL CARE: 0
MAKING YOU WORRY: 1
DIFFICULTY WITH SEXUAL ACTIVITIES: 1
MAKING YOU STAY IN A HOSPITAL: 0
WORKING AROUND THE HOUSE OR YARD DIFFICULT: 0
DIFFICULTY WORKING TO EARN A LIVING: 0
EATING LESS FOODS YOU LIKE: 4
DIFFICULTY WITH RECREATIONAL PASTIMES, SPORTS, HOBBIES: 1
TIRED, FATIGUED OR LOW ON ENERGY: 2
WALKING ABOUT OR CLIMBING STAIRS DIFFICULT: 0
DIFFICULTY SLEEPING WELL AT NIGHT: 1
MAKING YOU FEEL DEPRESSED: 1
TOTAL_SCORE: 17
LOSS OF SELF CONTROL IN YOUR LIFE: 0

## 2021-08-02 ASSESSMENT — ENCOUNTER SYMPTOMS
TINGLING: 0
PND: 0
CLAUDICATION: 0
BLOOD IN STOOL: 0
COUGH: 0
BRUISES/BLEEDS EASILY: 0
ABDOMINAL PAIN: 0
FALLS: 0
WEIGHT LOSS: 0
FEVER: 0
ORTHOPNEA: 0
LOSS OF CONSCIOUSNESS: 0
DIZZINESS: 1
VOMITING: 0
BLURRED VISION: 0
NAUSEA: 1
PALPITATIONS: 0
MYALGIAS: 0
SHORTNESS OF BREATH: 0

## 2021-08-02 ASSESSMENT — FIBROSIS 4 INDEX: FIB4 SCORE: 0.87

## 2021-08-02 NOTE — PROGRESS NOTES
Chief Complaint   Patient presents with   • Congestive Heart Failure   • Atrial Fibrillation       Subjective:   Ross Blake is a 50 y.o. male who presents today for heart failure follow-up with his wife, Estefany.  Patient was last seen on 7/1/2021.  Patient was also seen by Justa Reynolds in electrophysiology 6/24/2021.  Patient is also followed by pharmacotherapy clinic.      Patient was admitted on 5/15/2021 for TURK and lower extremity edema.  Patient had an episode of V. fib/cardiac arrest while admitted on 5/15/2021.  Patient underwent coronary angiogram on 5/17 which showed nonobstructive coronary artery disease.  Patient underwent AICD placement on 5/18/2021.  Patient was also discovered to have newly reduced ejection fraction at 35%.    Patient reports past medical history of venous stasis with history of vein stripping and MANSOOR.  Patient reports a history of alcohol use.    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  Justa Reynolds discussed these results with patient on 7/28/2021.    Today, patient reports dizziness and orthostatic hypotension. Patient denies chest pain, shortness of breath, palpitations, orthopnea, PND or Edema.  Patient reports his dizziness started after his last pharmacotherapy appointment where they increased his metoprolol.  Discussed adjusting other medications for his blood pressure; we will decrease his metoprolol for now.  Patient reports heart rate at home 60s to 70s.  Parameters provided to notify office if heart rate is in the 90s.  We discussed his genetic testing results; printed copy provided to patient in office today.  Patient reports his current weight at home was 237 pounds.  This is stable.    Today, based on physical examination findings, patient is euvolemic. No JVD, lungs are clear to auscultation, trace edema in bilateral lower extremities, no ascites. Dry  weight is 235 lbs.    We will also prescribe eplerenone with close follow-up with pharmacotherapy clinic in 2 weeks.    No past medical history on file.  No past surgical history on file.  No family history on file.  Social History     Socioeconomic History   • Marital status:      Spouse name: Not on file   • Number of children: Not on file   • Years of education: Not on file   • Highest education level: Not on file   Occupational History   • Not on file   Tobacco Use   • Smoking status: Former Smoker     Types: Cigarettes   • Smokeless tobacco: Never Used   Vaping Use   • Vaping Use: Former   • Quit date: 5/14/2021   Substance and Sexual Activity   • Alcohol use: Yes     Comment: Occasionally   • Drug use: No   • Sexual activity: Not on file   Other Topics Concern   • Not on file   Social History Narrative   • Not on file     Social Determinants of Health     Financial Resource Strain:    • Difficulty of Paying Living Expenses:    Food Insecurity:    • Worried About Running Out of Food in the Last Year:    • Ran Out of Food in the Last Year:    Transportation Needs:    • Lack of Transportation (Medical):    • Lack of Transportation (Non-Medical):    Physical Activity:    • Days of Exercise per Week:    • Minutes of Exercise per Session:    Stress:    • Feeling of Stress :    Social Connections:    • Frequency of Communication with Friends and Family:    • Frequency of Social Gatherings with Friends and Family:    • Attends [employer] Services:    • Active Member of Clubs or Organizations:    • Attends Club or Organization Meetings:    • Marital Status:    Intimate Partner Violence:    • Fear of Current or Ex-Partner:    • Emotionally Abused:    • Physically Abused:    • Sexually Abused:      No Known Allergies  Outpatient Encounter Medications as of 8/2/2021   Medication Sig Dispense Refill   • metoprolol SR (TOPROL XL) 50 MG TABLET SR 24 HR Take 1 tablet by mouth every day. 100 tablet 2   • amiodarone  (CORDARONE) 200 MG Tab Take 1 tablet by mouth every day. 100 tablet 2   • apixaban (ELIQUIS) 5mg Tab Take 1 tablet by mouth 2 times a day. Indications: Thromboembolism secondary to Atrial Fibrillation 180 tablet 2   • furosemide (LASIX) 40 MG Tab Take 1 tablet by mouth every day. 100 tablet 2   • valsartan (DIOVAN) 80 MG Tab Take 1 tablet by mouth every day. 100 tablet 2   • Dapagliflozin Propanediol 10 MG Tab Take 10 mg by mouth every day. 30 tablet 11   • thiamine (THIAMINE) 100 MG tablet Take 1 tablet by mouth every day. 30 tablet 0   • Ascorbic Acid (VITAMIN C GUMMIE PO) Take 3-4 Tablets by mouth every day.     • multivitamin-iron-minerals-folic acid (CENTRUM) chewable tablet Chew 2 Tablets every day.       No facility-administered encounter medications on file as of 8/2/2021.     Review of Systems   Constitutional: Negative for fever, malaise/fatigue and weight loss.   Eyes: Negative for blurred vision.   Respiratory: Negative for cough and shortness of breath.    Cardiovascular: Negative for chest pain, palpitations, orthopnea, claudication, leg swelling and PND.   Gastrointestinal: Positive for nausea. Negative for abdominal pain, blood in stool and vomiting.   Genitourinary: Negative for dysuria, frequency and hematuria.   Musculoskeletal: Negative for falls and myalgias.   Neurological: Positive for dizziness. Negative for tingling and loss of consciousness.   Endo/Heme/Allergies: Does not bruise/bleed easily.        Objective:   /72 (BP Location: Right arm, Patient Position: Sitting, BP Cuff Size: Adult)   Pulse 87   Ht 1.829 m (6')   Wt 108 kg (237 lb)   SpO2 94%   BMI 32.14 kg/m²     Physical Exam   Constitutional: He is oriented to person, place, and time. He appears well-developed.   HENT:   Head: Normocephalic and atraumatic.   Eyes: Pupils are equal, round, and reactive to light.   Neck: No JVD present.   Cardiovascular: Normal rate, regular rhythm, normal heart sounds and normal pulses.  Exam reveals no gallop and no friction rub.   No murmur heard.  Pulmonary/Chest: Effort normal and breath sounds normal. No respiratory distress.   Abdominal: Soft. Bowel sounds are normal. He exhibits no distension.   Musculoskeletal:      Right lower leg: Edema (trace) present.      Left lower leg: Edema (trace) present.   Neurological: He is alert and oriented to person, place, and time.   Skin: Skin is warm and dry. No erythema.   Psychiatric: His behavior is normal. Mood normal.   Vitals reviewed.      Assessment:     No diagnosis found.  No results found for: CHOLSTRLTOT, LDL, HDL, TRIGLYCERIDE    Lab Results   Component Value Date/Time    SODIUM 140 07/08/2021 01:29 PM    POTASSIUM 4.3 07/08/2021 01:29 PM    CHLORIDE 104 07/08/2021 01:29 PM    CO2 25 07/08/2021 01:29 PM    GLUCOSE 109 (H) 07/08/2021 01:29 PM    BUN 16 07/08/2021 01:29 PM    CREATININE 1.02 07/08/2021 01:29 PM     Lab Results   Component Value Date/Time    ALKPHOSPHAT 69 05/20/2021 05:28 AM    ASTSGOT 15 05/20/2021 05:28 AM    ALTSGPT 20 05/20/2021 05:28 AM    TBILIRUBIN 0.7 05/20/2021 05:28 AM       Ref. Range 5/18/2021 05:30   NT-proBNP Latest Ref Range: 0 - 125 pg/mL 910 (H)     Echocardiogram:  5/14/21  CONCLUSIONS  Normal left ventricular chamber size. Moderately reduced left   ventricular systolic function. Left ventricular ejection fraction is   visually estimated to be 35%.  Normal right ventricular size and systolic function.  Mild to moderate mitral regurgitation.  Estimated right ventricular systolic pressure is 45 mmHg; mild   pulmonary hypertension.  Right atrial pressure is estimated to be 15 mmHg.  No pericardial effusion seen.     Cardiac Catheterization:   5/17/2021  AORTIC VALVE:  There is no significant gradient noted.     LEFT VENTRICULOGRAM:  A 10 mL of contrast were delivered for 3 seconds.    Ejection fraction was estimated to be 25%.  There was global hypokinesis noted   at angiogram.     LEFT MAIN CORONARY ARTERY:   Left main coronary artery is a long, large-caliber   vessel free of disease.     LEFT ANTERIOR DESCENDING ARTERY:  Left anterior descending artery is a long   vessel, which wraps around the apex.  It originates as a very large caliber   vessel and tapers to small caliber at the apex.  There are moderate to small   caliber diagonal branches noted.     RAMUS INTERMEDIUS:  Ramus intermedius is a large bifurcating vessel free of   disease.     LEFT CIRCUMFLEX ARTERY:  Left circumflex artery is a nondominant moderate   caliber vessel with small obtuse marginal branches.  Left circumflex artery   and its branches are free of disease.     RIGHT CORONARY ARTERY:  Right coronary artery is a dominant large caliber   vessel with long large-caliber posterior descending artery, which wraps the   apex as well as a moderate to large caliber posterolateral branch.  Right   coronary artery and its branches are free of disease.     IMPRESSION:  1.  No angiographic evidence of coronary artery disease.  2.  Reduced left ventricular systolic function with ejection fraction 25%.  3.  Elevated left ventricular end-diastolic pressure.    MDT Device Interrogation (5/19/2021):  RA: p waves 3.0 mV, Impedance 494 ohms, Threshold 0.25 V  RV: r waves 8.1 mV, Impedance 532 ohms, Threshold, 0.5 V, HV Impedance 63 ohms.     Chest X-Ray (5/19/21):   1.  Pulmonary edema and/or infiltrates are identified, which are stable since the prior exam.  2.  Cardiomegaly    EKG (6/24/2021): Personally interpreted by me as sinus rhythm    Medical Decision Making:  Today's Assessment / Status / Plan:   HFrEF, Stage C, Class II, LVEF 35%  -Heart failure due to non-ischemic cardiomyopathy toxin VS rate-related  -Genetic testing showed autosomal recessive GAA variant  -Continue valsartan 80 mg BID.  Discussed Entresto; patient would like to defer until follow-up echocardiogram.  -Decrease metoprolol XL 25 mg daily  -Hold spironolactone due to elevated potassium as  well as gynecomastia.  Will prescribe eplerenone  -Continue farxiga 10 mg daily.   -Continue furosemide 40 mg daily.   -Labs: Reviewed. BMP in 2 weeks after eplerenone initiation.    -AICD placed 5/18/2021 for secondary prevention  -Follow-up echo ordered. Consider Cardiac MRI if EF does not improve after 3 months GDMT.  -Reinforced s/sx of worsening heart failure with patient and weight monitoring. Pt verbalizes understanding. Pt to call office if present.    -Heart Failure Education: pt to be contacted by HF nurse for further education  -Advanced care planning: Discussed previously.  Will continue to monitor    VF Cardiac cardiac arrest S/P AICD; Paroxymal Afib  -meditronic dual chamber AICD 5/18/2021  -Device interrogated on 6/24/2021 showing normal function.  No device therapies, no arrhythmias, minimal pacing.  -continue amiodarone 200 mg daily   -Eliquis 5 mg BID    ETOH abuse  -Tx for withdrawals while admitted per chart review. Patient denies excessive etoh use. Discussed importance of cessation for heart function.     Venous Stasis  -Hx of vein stripping and ablations (x3-most recent 2020) for varicose veins post remote lower extremity trauma.  -Chronic lower extremity edema; patient reports currently baseline    MANSOOR; Mild pulmonary hypertension   -Patient reports pending sleep medicine evaluation.    FU in clinic in 1 month. Sooner if needed.    Patient verbalizes understanding and agrees with the plan of care.     Collaborating MD: Dr. Leilani MD    PLEASE NOTE: This Note was created using voice recognition Software. I have made every reasonable attempt to correct obvious errors, but I expect that there are errors of grammar and possibly content that I did not discover before finalizing the note

## 2021-08-03 ENCOUNTER — TELEPHONE (OUTPATIENT)
Dept: CARDIOLOGY | Facility: MEDICAL CENTER | Age: 51
End: 2021-08-03

## 2021-08-03 NOTE — TELEPHONE ENCOUNTER
JG    Patient called to advise they had another dizzy spell and fell. They rose fine but they wanted to let us know what is going on.     Thank you,  Elisabeth ALVARADO

## 2021-08-04 NOTE — TELEPHONE ENCOUNTER
Left detailed message on private VM, encouraged patient to seek ER if he hit his head, asked to call back to discuss symptoms before and after fall

## 2021-08-06 ENCOUNTER — TELEPHONE (OUTPATIENT)
Dept: CARDIOLOGY | Facility: MEDICAL CENTER | Age: 51
End: 2021-08-06

## 2021-08-06 NOTE — TELEPHONE ENCOUNTER
Pt returned call. Tried reaching nurse, but unavailable. Pt states on that day all of a sudden he felt very dizzy, fell and passed out, when he came to he felt fine. Pt said it was just that one time and he is feeling fine now. Pt is picking up eplerenone  today. Pt states he does not know how to send a manual transmission but will be home in about an hour. Please call Pt back at [phone].    Thank you

## 2021-08-06 NOTE — TELEPHONE ENCOUNTER
S/W pt, states he has been good ever since, communicated with Tita and she will contact patient to assist with manual transmission

## 2021-08-06 NOTE — TELEPHONE ENCOUNTER
Pt sent manual transmission today: No episodes.  Presenting rhythm today: SR  Will scan into media.

## 2021-08-24 ENCOUNTER — DOCUMENTATION (OUTPATIENT)
Dept: VASCULAR LAB | Facility: MEDICAL CENTER | Age: 51
End: 2021-08-24

## 2021-08-24 NOTE — PROGRESS NOTES
[facility]      Called to follow up with the patient as he missed his follow up appt in CHF clinic yesterday.   LVM for the patient to call the clinic back to reschedule his missed appt.         Keyonna EdwardsD

## 2021-08-30 ENCOUNTER — TELEPHONE (OUTPATIENT)
Dept: CARDIOLOGY | Facility: MEDICAL CENTER | Age: 51
End: 2021-08-30

## 2021-08-30 NOTE — TELEPHONE ENCOUNTER
S/W Wife, assured that all records can be sent if they need. They will restart medication they feel is necessary but best to stop eliquis until confirmed no brain bleed. They can monitor his rhythm.

## 2021-08-30 NOTE — TELEPHONE ENCOUNTER
Pts wife Estefany called stating Pt fell and hit his head. Estefany states the ambulance came and is taking Pt to [facility] because [facility] is full. Estefany is concerned they will not be able to care for Pts heart issues. Please call Estefany back at   [phone].    Thank you

## 2021-08-31 ENCOUNTER — DOCUMENTATION (OUTPATIENT)
Dept: VASCULAR LAB | Facility: MEDICAL CENTER | Age: 51
End: 2021-08-31

## 2021-08-31 NOTE — PROGRESS NOTES
[facility]        Called to follow up with the patient as he missed his follow up appt in CHF clinic last week.   LVM for the patient to call the clinic back to reschedule his missed appt.            Keyonna EdwardsD

## 2021-09-03 ENCOUNTER — HOSPITAL ENCOUNTER (INPATIENT)
Facility: REHABILITATION | Age: 51
End: 2021-09-03
Attending: PHYSICAL MEDICINE & REHABILITATION | Admitting: PHYSICAL MEDICINE & REHABILITATION
Payer: COMMERCIAL

## 2021-09-13 ENCOUNTER — DOCUMENTATION (OUTPATIENT)
Dept: VASCULAR LAB | Facility: MEDICAL CENTER | Age: 51
End: 2021-09-13

## 2021-09-13 ENCOUNTER — HOSPITAL ENCOUNTER (OUTPATIENT)
Dept: LAB | Facility: MEDICAL CENTER | Age: 51
End: 2021-09-13
Attending: NURSE PRACTITIONER
Payer: COMMERCIAL

## 2021-09-13 DIAGNOSIS — I10 ESSENTIAL HYPERTENSION, BENIGN: ICD-10-CM

## 2021-09-13 DIAGNOSIS — I49.01 VENTRICULAR FIBRILLATION (HCC): ICD-10-CM

## 2021-09-13 DIAGNOSIS — Z79.899 HIGH RISK MEDICATION USE: ICD-10-CM

## 2021-09-13 DIAGNOSIS — I50.21 ACUTE SYSTOLIC HEART FAILURE (HCC): ICD-10-CM

## 2021-09-13 DIAGNOSIS — I50.20 ACC/AHA STAGE C SYSTOLIC HEART FAILURE (HCC): ICD-10-CM

## 2021-09-13 LAB
ANION GAP SERPL CALC-SCNC: 11 MMOL/L (ref 7–16)
BUN SERPL-MCNC: 11 MG/DL (ref 8–22)
CALCIUM SERPL-MCNC: 9.3 MG/DL (ref 8.5–10.5)
CHLORIDE SERPL-SCNC: 101 MMOL/L (ref 96–112)
CO2 SERPL-SCNC: 25 MMOL/L (ref 20–33)
CREAT SERPL-MCNC: 0.86 MG/DL (ref 0.5–1.4)
GLUCOSE SERPL-MCNC: 82 MG/DL (ref 65–99)
POTASSIUM SERPL-SCNC: 4.4 MMOL/L (ref 3.6–5.5)
SODIUM SERPL-SCNC: 137 MMOL/L (ref 135–145)

## 2021-09-13 PROCEDURE — 80048 BASIC METABOLIC PNL TOTAL CA: CPT

## 2021-09-13 PROCEDURE — 36415 COLL VENOUS BLD VENIPUNCTURE: CPT

## 2021-09-13 NOTE — PROGRESS NOTES
[facility]        Called to follow up with the patient as he missed his follow up appt in CHF clinic last week.   LVM for the patient to call the clinic back to reschedule his missed appt.          Keyonna EdwardsD

## 2021-09-16 ENCOUNTER — HOSPITAL ENCOUNTER (OUTPATIENT)
Dept: CARDIOLOGY | Facility: MEDICAL CENTER | Age: 51
End: 2021-09-16
Attending: INTERNAL MEDICINE
Payer: COMMERCIAL

## 2021-09-16 ENCOUNTER — DOCUMENTATION (OUTPATIENT)
Dept: VASCULAR LAB | Facility: MEDICAL CENTER | Age: 51
End: 2021-09-16

## 2021-09-16 DIAGNOSIS — G47.33 OSA (OBSTRUCTIVE SLEEP APNEA): ICD-10-CM

## 2021-09-16 DIAGNOSIS — I50.20 ACC/AHA STAGE C SYSTOLIC HEART FAILURE (HCC): ICD-10-CM

## 2021-09-16 DIAGNOSIS — Z79.899 HIGH RISK MEDICATION USE: ICD-10-CM

## 2021-09-16 DIAGNOSIS — I48.91 ATRIAL FIBRILLATION, UNSPECIFIED TYPE (HCC): ICD-10-CM

## 2021-09-16 DIAGNOSIS — I48.0 PAROXYSMAL ATRIAL FIBRILLATION (HCC): ICD-10-CM

## 2021-09-16 DIAGNOSIS — I47.20 VENTRICULAR TACHYARRHYTHMIA (HCC): ICD-10-CM

## 2021-09-16 DIAGNOSIS — I10 ESSENTIAL HYPERTENSION, BENIGN: ICD-10-CM

## 2021-09-16 DIAGNOSIS — Z95.810 ICD (IMPLANTABLE CARDIOVERTER-DEFIBRILLATOR), DUAL, IN SITU: ICD-10-CM

## 2021-09-16 PROCEDURE — 93306 TTE W/DOPPLER COMPLETE: CPT

## 2021-09-16 NOTE — PROGRESS NOTES
Received call from pt looking to reschedule missed HF f/u.    LVM to return my call    Mirta Oliveira, JerryD

## 2021-09-17 ENCOUNTER — TELEPHONE (OUTPATIENT)
Dept: CARDIOLOGY | Facility: MEDICAL CENTER | Age: 51
End: 2021-09-17

## 2021-09-17 DIAGNOSIS — I50.21 ACUTE SYSTOLIC HEART FAILURE (HCC): ICD-10-CM

## 2021-09-17 LAB
LV EJECT FRACT  99904: 65
LV EJECT FRACT MOD 2C 99903: 64.71
LV EJECT FRACT MOD 4C 99902: 65.36
LV EJECT FRACT MOD BP 99901: 64.42

## 2021-09-17 PROCEDURE — 93306 TTE W/DOPPLER COMPLETE: CPT | Mod: 26 | Performed by: INTERNAL MEDICINE

## 2021-09-17 RX ORDER — FUROSEMIDE 40 MG/1
40 TABLET ORAL DAILY
Qty: 100 TABLET | Refills: 2 | OUTPATIENT
Start: 2021-09-17

## 2021-09-17 NOTE — TELEPHONE ENCOUNTER
JG    Patient called to advise they fell recently and went to [facility] to have a procedure done and was taken off off meds furosemide (LASIX) 40 MG Tab and apixaban (ELIQUIS) 5mg Tab. They are ok'd to go back back on the Lasix but wanted to discuss. They can be reached at [phone].    Thank you,  Elisabeth ALVARADO

## 2021-09-17 NOTE — TELEPHONE ENCOUNTER
S/W pt, 8/31/21 pt passed out and hit his head, got a subdural hematoma. Performed craniotomy to relieve pressure. Was at [facility]. Records will be requested    Neruo stopped eliquis. Hx of PAF-Unsure of current rhythm. Pt has tried to send transmissions of AICD but unsure if they are going through.    Seeing DANIELLE Thursday next week, EA on 9/30/21

## 2021-09-20 NOTE — TELEPHONE ENCOUNTER
We are receiving his transmissions, no episode of note. 5-AMS episodes since 9/4/2021 lasting between 20-40 seconds long, AMS burden <0.1%.

## 2021-09-23 ENCOUNTER — OFFICE VISIT (OUTPATIENT)
Dept: CARDIOLOGY | Facility: MEDICAL CENTER | Age: 51
End: 2021-09-23
Payer: COMMERCIAL

## 2021-09-23 VITALS
SYSTOLIC BLOOD PRESSURE: 92 MMHG | HEIGHT: 72 IN | DIASTOLIC BLOOD PRESSURE: 68 MMHG | BODY MASS INDEX: 31.56 KG/M2 | OXYGEN SATURATION: 95 % | RESPIRATION RATE: 14 BRPM | HEART RATE: 82 BPM | WEIGHT: 233 LBS

## 2021-09-23 DIAGNOSIS — G47.33 OSA (OBSTRUCTIVE SLEEP APNEA): ICD-10-CM

## 2021-09-23 DIAGNOSIS — I10 ESSENTIAL HYPERTENSION, BENIGN: ICD-10-CM

## 2021-09-23 DIAGNOSIS — I50.20 ACC/AHA STAGE C SYSTOLIC HEART FAILURE (HCC): ICD-10-CM

## 2021-09-23 DIAGNOSIS — I47.20 VENTRICULAR TACHYARRHYTHMIA (HCC): ICD-10-CM

## 2021-09-23 DIAGNOSIS — I48.0 PAROXYSMAL ATRIAL FIBRILLATION (HCC): ICD-10-CM

## 2021-09-23 DIAGNOSIS — Z79.899 HIGH RISK MEDICATION USE: ICD-10-CM

## 2021-09-23 DIAGNOSIS — Z95.810 ICD (IMPLANTABLE CARDIOVERTER-DEFIBRILLATOR), DUAL, IN SITU: ICD-10-CM

## 2021-09-23 DIAGNOSIS — I50.21 ACUTE SYSTOLIC HEART FAILURE (HCC): ICD-10-CM

## 2021-09-23 DIAGNOSIS — I48.91 ATRIAL FIBRILLATION, UNSPECIFIED TYPE (HCC): ICD-10-CM

## 2021-09-23 PROCEDURE — 99214 OFFICE O/P EST MOD 30 MIN: CPT | Performed by: NURSE PRACTITIONER

## 2021-09-23 RX ORDER — LEVETIRACETAM 1000 MG/1
1000 TABLET ORAL 2 TIMES DAILY
COMMUNITY
Start: 2021-09-06 | End: 2021-12-21

## 2021-09-23 RX ORDER — SODIUM CHLORIDE 1 G/1
TABLET ORAL
COMMUNITY
Start: 2021-09-06 | End: 2021-09-23

## 2021-09-23 RX ORDER — LOSARTAN POTASSIUM 25 MG/1
25 TABLET ORAL DAILY
Qty: 90 TABLET | Refills: 3 | Status: SHIPPED | OUTPATIENT
Start: 2021-09-23 | End: 2022-04-25 | Stop reason: SDUPTHER

## 2021-09-23 RX ORDER — DIAZEPAM 2 MG/1
TABLET ORAL
COMMUNITY
Start: 2021-09-06 | End: 2021-09-23

## 2021-09-23 ASSESSMENT — ENCOUNTER SYMPTOMS
BLOOD IN STOOL: 0
WEIGHT LOSS: 0
SHORTNESS OF BREATH: 0
BRUISES/BLEEDS EASILY: 0
ABDOMINAL PAIN: 0
VOMITING: 0
TINGLING: 0
FEVER: 0
PND: 0
PALPITATIONS: 0
CLAUDICATION: 0
NAUSEA: 0
DIZZINESS: 1
LOSS OF CONSCIOUSNESS: 0
ORTHOPNEA: 0
COUGH: 0
MYALGIAS: 0
FALLS: 0
BLURRED VISION: 0

## 2021-09-23 ASSESSMENT — FIBROSIS 4 INDEX: FIB4 SCORE: 0.87

## 2021-09-23 NOTE — PATIENT INSTRUCTIONS
Stop furosemide. Use Furosemide 20 mg daily as needed for weight gain greater than 3 pounds in 1 day or 5 pounds in 1 week.     Aspirin 325 when Dr. Mandujano clears you    Stop valsartan and start losartan    SLOW POSITION CHANGES!!!!

## 2021-09-23 NOTE — PROGRESS NOTES
Chief Complaint   Patient presents with   • CHF (Systolic)     F/V Dx: ACC/AHA stage C systolic heart failure (HCC)   • Atrial Fibrillation       Subjective:   Ross Blake is a 50 y.o. male who presents today for heart failure follow-up with his wife, Estefany.  Patient was last seen on 8/2/2021.  Patient was also seen by Justa Reynolds in electrophysiology 6/24/2021.  Patient is also followed by pharmacotherapy clinic.  Since patient was last seen, patient was getting up from his couch on 8/30/2021 where he experienced a syncopal event and fell to the floor.  Patient was evaluated at [facility] where he is found to have subdural hematoma and underwent surgical craniotomy with evacuation with Dr. Mandujano same day.  His Eliquis was stopped at this time.    Patient was admitted on 5/15/2021 for TURK and lower extremity edema.  Patient had an episode of V. fib/cardiac arrest while admitted on 5/15/2021.  Patient underwent coronary angiogram on 5/17 which showed nonobstructive coronary artery disease.  Patient underwent AICD placement on 5/18/2021.  Patient was also discovered to have newly reduced ejection fraction at 35%.    Patient reports past medical history of venous stasis with history of vein stripping and MANSOOR.  Patient reports a history of alcohol use.    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  Justa Reynolds discussed these results with patient on 7/28/2021.    Patient had remote device transmission on 9/16/2021.  This was reviewed in office today.  Normal impedance, no mode switches, treatments, or A. fib burden noted.    Today, patient reports he feels well and is recovering well.  Patient reports his orthostatic hypotension occurs only when he is standing up from his couch, this does not occur when he is changing positions from sitting to standing from a chair.  We discussed using compression stockings for his venous  insufficiency.  Otherwise, Patient  denies chest pain, shortness of breath, palpitations, dizziness/lightheadedness, orthopnea, PND or Edema.     We discussed his echocardiogram results per below.  We adjusted his GDMT per below for systolic blood pressure in the 90s in office today.  Patient reports his blood pressure at home is 110-115 systolically.  Patient reports stable weight at home as well.    Based on physical examination findings, patient is euvolemic. No JVD, lungs are clear to auscultation, no pitting edema in bilateral lower extremities, no ascites. Dry weight is 233 lbs.    History reviewed. No pertinent past medical history.  History reviewed. No pertinent surgical history.  History reviewed. No pertinent family history.  Social History     Socioeconomic History   • Marital status:      Spouse name: Not on file   • Number of children: Not on file   • Years of education: Not on file   • Highest education level: Not on file   Occupational History   • Not on file   Tobacco Use   • Smoking status: Former Smoker     Types: Cigarettes   • Smokeless tobacco: Never Used   Vaping Use   • Vaping Use: Former   • Quit date: 5/14/2021   Substance and Sexual Activity   • Alcohol use: Yes     Comment: Occasionally   • Drug use: No   • Sexual activity: Not on file   Other Topics Concern   • Not on file   Social History Narrative   • Not on file     Social Determinants of Health     Financial Resource Strain:    • Difficulty of Paying Living Expenses:    Food Insecurity:    • Worried About Running Out of Food in the Last Year:    • Ran Out of Food in the Last Year:    Transportation Needs:    • Lack of Transportation (Medical):    • Lack of Transportation (Non-Medical):    Physical Activity:    • Days of Exercise per Week:    • Minutes of Exercise per Session:    Stress:    • Feeling of Stress :    Social Connections:    • Frequency of Communication with Friends and Family:    • Frequency of Social Gatherings  with Friends and Family:    • Attends Nondenominational Services:    • Active Member of Clubs or Organizations:    • Attends Club or Organization Meetings:    • Marital Status:    Intimate Partner Violence:    • Fear of Current or Ex-Partner:    • Emotionally Abused:    • Physically Abused:    • Sexually Abused:      No Known Allergies  Outpatient Encounter Medications as of 9/23/2021   Medication Sig Dispense Refill   • levetiracetam (KEPPRA) 1000 MG tablet Take 1,000 mg by mouth 2 times a day.     • losartan (COZAAR) 25 MG Tab Take 1 Tablet by mouth every day. 90 Tablet 3   • metoprolol SR (TOPROL XL) 25 MG TABLET SR 24 HR Take 1 tablet by mouth every day. 30 tablet 5   • eplerenone (INSPRA) 25 MG Tab Take 0.5 Tablets by mouth every morning. 45 tablet 3   • amiodarone (CORDARONE) 200 MG Tab Take 1 tablet by mouth every day. 100 tablet 2   • Dapagliflozin Propanediol 10 MG Tab Take 10 mg by mouth every day. 30 tablet 11   • Ascorbic Acid (VITAMIN C GUMMIE PO) Take 3-4 Tablets by mouth every day.     • multivitamin-iron-minerals-folic acid (CENTRUM) chewable tablet Chew 2 Tablets every day.     • [DISCONTINUED] diazePAM (VALIUM) 2 MG Tab TAKE 1 2 (ONE HALF) TABLET BY MOUTH THREE TIMES DAILY FOR 1 DAY THEN 1 2 (ONE HALF) TWICE DAILY FOR 1 DAY THEN 1 2 (ONE HALF) ONCE DAILY FOR 1 DAY. THEN DISCONTINUE (Patient not taking: Reported on 9/23/2021)     • [DISCONTINUED] sodium chloride (SALT) 1 GM Tab TAKE 2 TABLETS BY MOUTH THREE TIMES DAILY FURTHER REFILLS DEPENDING ON SODIUM LEVELS RECOMMEND CHECKING SODIUM LEVELS IN 1 WEEK (Patient not taking: Reported on 9/23/2021)     • [DISCONTINUED] apixaban (ELIQUIS) 5mg Tab Take 1 tablet by mouth 2 times a day. Indications: Thromboembolism secondary to Atrial Fibrillation (Patient not taking: Reported on 9/23/2021) 180 tablet 2   • [DISCONTINUED] furosemide (LASIX) 40 MG Tab Take 1 tablet by mouth every day. 100 tablet 2   • [DISCONTINUED] valsartan (DIOVAN) 80 MG Tab Take 1 tablet by  mouth every day. 100 tablet 2   • [DISCONTINUED] thiamine (THIAMINE) 100 MG tablet Take 1 tablet by mouth every day. (Patient not taking: Reported on 2021) 30 tablet 0     No facility-administered encounter medications on file as of 2021.     Review of Systems   Constitutional: Negative for fever, malaise/fatigue and weight loss.   Eyes: Negative for blurred vision.   Respiratory: Negative for cough and shortness of breath.    Cardiovascular: Negative for chest pain, palpitations, orthopnea, claudication, leg swelling and PND.   Gastrointestinal: Negative for abdominal pain, blood in stool, nausea and vomiting.   Genitourinary: Negative for dysuria, frequency and hematuria.   Musculoskeletal: Negative for falls and myalgias.   Neurological: Positive for dizziness (with position changes). Negative for tingling and loss of consciousness.   Endo/Heme/Allergies: Does not bruise/bleed easily.        Objective:   BP (!) 92/68 (BP Location: Left arm, Patient Position: Sitting, BP Cuff Size: Adult)   Pulse 82   Resp 14   Ht 1.829 m (6')   Wt 106 kg (233 lb)   SpO2 95%   BMI 31.60 kg/m²     Physical Exam  Vitals reviewed.   Constitutional:       General: He is not in acute distress.     Appearance: He is well-developed.   HENT:      Head: Normocephalic and atraumatic.   Eyes:      Pupils: Pupils are equal, round, and reactive to light.   Neck:      Vascular: No JVD.   Cardiovascular:      Rate and Rhythm: Normal rate and regular rhythm.      Pulses: Normal pulses.      Heart sounds: Normal heart sounds. No murmur heard.   No friction rub. No gallop.    Pulmonary:      Effort: Pulmonary effort is normal. No respiratory distress.      Breath sounds: Normal breath sounds.   Abdominal:      General: Bowel sounds are normal. There is no distension.      Palpations: Abdomen is soft.   Musculoskeletal:      Right lower le+ Edema present.      Left lower le+ Edema present.   Skin:     General: Skin is warm  and dry.      Findings: No erythema.   Neurological:      General: No focal deficit present.      Mental Status: He is alert and oriented to person, place, and time. Mental status is at baseline.   Psychiatric:         Mood and Affect: Mood normal.         Behavior: Behavior normal.         Assessment:     1. Acute systolic heart failure (HCC)  losartan (COZAAR) 25 MG Tab   2. ACC/AHA stage C systolic heart failure (HCC)  losartan (COZAAR) 25 MG Tab   3. Ventricular tachyarrhythmia (HCC)  losartan (COZAAR) 25 MG Tab   4. ICD (implantable cardioverter-defibrillator), dual, in situ Medtronic Chrome placed 5/18/21  losartan (COZAAR) 25 MG Tab   5. Atrial fibrillation, unspecified type (HCC)     6. MANSOOR (obstructive sleep apnea)  losartan (COZAAR) 25 MG Tab   7. Paroxysmal atrial fibrillation (HCC)     8. Essential hypertension, benign  losartan (COZAAR) 25 MG Tab   9. High risk medication use  losartan (COZAAR) 25 MG Tab     No results found for: CHOLSTRLTOT, LDL, HDL, TRIGLYCERIDE    Lab Results   Component Value Date/Time    SODIUM 137 09/13/2021 11:00 AM    POTASSIUM 4.4 09/13/2021 11:00 AM    CHLORIDE 101 09/13/2021 11:00 AM    CO2 25 09/13/2021 11:00 AM    GLUCOSE 82 09/13/2021 11:00 AM    BUN 11 09/13/2021 11:00 AM    CREATININE 0.86 09/13/2021 11:00 AM     Lab Results   Component Value Date/Time    ALKPHOSPHAT 69 05/20/2021 05:28 AM    ASTSGOT 15 05/20/2021 05:28 AM    ALTSGPT 20 05/20/2021 05:28 AM    TBILIRUBIN 0.7 05/20/2021 05:28 AM       Ref. Range 5/18/2021 05:30   NT-proBNP Latest Ref Range: 0 - 125 pg/mL 910 (H)     Echocardiogram:  5/14/21  CONCLUSIONS  Normal left ventricular chamber size. Moderately reduced left   ventricular systolic function. Left ventricular ejection fraction is   visually estimated to be 35%.  Normal right ventricular size and systolic function.  Mild to moderate mitral regurgitation.  Estimated right ventricular systolic pressure is 45 mmHg; mild   pulmonary hypertension.  Right  atrial pressure is estimated to be 15 mmHg.  No pericardial effusion seen.     Cardiac Catheterization:   5/17/2021  AORTIC VALVE:  There is no significant gradient noted.     LEFT VENTRICULOGRAM:  A 10 mL of contrast were delivered for 3 seconds.    Ejection fraction was estimated to be 25%.  There was global hypokinesis noted   at angiogram.     LEFT MAIN CORONARY ARTERY:  Left main coronary artery is a long, large-caliber   vessel free of disease.     LEFT ANTERIOR DESCENDING ARTERY:  Left anterior descending artery is a long   vessel, which wraps around the apex.  It originates as a very large caliber   vessel and tapers to small caliber at the apex.  There are moderate to small   caliber diagonal branches noted.     RAMUS INTERMEDIUS:  Ramus intermedius is a large bifurcating vessel free of   disease.     LEFT CIRCUMFLEX ARTERY:  Left circumflex artery is a nondominant moderate   caliber vessel with small obtuse marginal branches.  Left circumflex artery   and its branches are free of disease.     RIGHT CORONARY ARTERY:  Right coronary artery is a dominant large caliber   vessel with long large-caliber posterior descending artery, which wraps the   apex as well as a moderate to large caliber posterolateral branch.  Right   coronary artery and its branches are free of disease.     IMPRESSION:  1.  No angiographic evidence of coronary artery disease.  2.  Reduced left ventricular systolic function with ejection fraction 25%.  3.  Elevated left ventricular end-diastolic pressure.    MDT Device Interrogation (5/19/2021):  RA: p waves 3.0 mV, Impedance 494 ohms, Threshold 0.25 V  RV: r waves 8.1 mV, Impedance 532 ohms, Threshold, 0.5 V, HV Impedance 63 ohms.     Chest X-Ray (5/19/21):   1.  Pulmonary edema and/or infiltrates are identified, which are stable since the prior exam.  2.  Cardiomegaly    EKG (6/24/2021): Personally interpreted by me as sinus rhythm    Transthoracic echocardiogram (9/16/2021): The left  ventricular ejection fraction is visually estimated to be 65%.Estimated right ventricular systolic pressure is 30 mmHg    Medical Decision Making:  Today's Assessment / Status / Plan:   HFrEF, Stage C, Class II, LVEF 65% recovered from 35%  -Heart failure due to non-ischemic cardiomyopathy toxin VS rate-related  -Genetic testing showed autosomal recessive GAA variant  -Stop valsartan initiate losartan 25 mg daily.  Reasonable not to transition to Entresto due to low blood pressure.  -Continue metoprolol XL 25 mg daily  -Continue eplerenone 25 mg daily  -Continue farxiga 10 mg daily.   -Decrease Furosemide to 20 mg daily as needed for weight gain greater than 3 pounds in 1 day or 5 pounds in 1 week.  -AICD placed 5/18/2021 for secondary prevention  -Reinforced s/sx of worsening heart failure with patient and weight monitoring. Pt verbalizes understanding. Pt to call office if present.    -Heart Failure Education: pt to be contacted by HF nurse for further education  -Advanced care planning: Discussed previously.  Will continue to monitor    VF Cardiac cardiac arrest S/P AICD; Paroxymal Afib  -meditronic dual chamber AICD 5/18/2021  -Remote transmission on 9/16/2016 showing normal function.  No device therapies, no arrhythmias, no shocks.  -continue amiodarone 200 mg daily   -[id number] 1  -OAC stopped due to subdural hematoma.  This is acceptable due to [id number].  Patient to follow-up with EP in November.    ETOH abuse  -Tx for withdrawals while admitted per chart review. Patient denies excessive etoh use. Discussed importance of cessation for heart function.   -Patient reports continued cessation    Venous Stasis/insufficiency  -Hx of vein stripping and ablations (x3-most recent 2020) for varicose veins post remote lower extremity trauma.  -Chronic lower extremity edema; patient reports currently baseline  -Encouraged compression stocking use due to dizziness with position changes    MANSOOR; Mild pulmonary  hypertension   -Patient reports pending sleep medicine evaluation.    FU in clinic in 2 months. Sooner if needed.    Patient verbalizes understanding and agrees with the plan of care.     Collaborating MD: Dr. Melissa MD    PLEASE NOTE: This Note was created using voice recognition Software. I have made every reasonable attempt to correct obvious errors, but I expect that there are errors of grammar and possibly content that I did not discover before finalizing the note

## 2021-09-27 ENCOUNTER — DOCUMENTATION (OUTPATIENT)
Dept: VASCULAR LAB | Facility: MEDICAL CENTER | Age: 51
End: 2021-09-27

## 2021-09-27 NOTE — PROGRESS NOTES
Called pt regarding missed f/u CHF pharmacotherapy appt - no answer. LVM to reschedule.    Shree Dumont, JerryD

## 2021-10-04 ENCOUNTER — DOCUMENTATION (OUTPATIENT)
Dept: VASCULAR LAB | Facility: MEDICAL CENTER | Age: 51
End: 2021-10-04

## 2021-10-04 NOTE — PROGRESS NOTES
Called pt regarding missed f/u CHF pharmacotherapy appt - no answer. LVM to reschedule.    4th attempt     Shree Dumont, JerryD

## 2021-10-10 DIAGNOSIS — I50.20 ACC/AHA STAGE C SYSTOLIC HEART FAILURE (HCC): ICD-10-CM

## 2021-10-11 ENCOUNTER — DOCUMENTATION (OUTPATIENT)
Dept: VASCULAR LAB | Facility: MEDICAL CENTER | Age: 51
End: 2021-10-11

## 2021-10-11 RX ORDER — METOPROLOL SUCCINATE 25 MG/1
25 TABLET, EXTENDED RELEASE ORAL DAILY
Qty: 90 TABLET | Refills: 3 | Status: SHIPPED | OUTPATIENT
Start: 2021-10-11 | End: 2022-07-17 | Stop reason: SDUPTHER

## 2021-10-11 NOTE — LETTER
October 11, 2021    Ross Blake  [address]  [place] [address]    10/11/21    Dear Ross Blake ,    We have been unsuccessful in our attempts to contact you regarding your Heart Failure appointments. It is important we are able to monitor your medications for safety and efficacy.     To monitor you effectively, we need to be able to communicate with you.  This is a requirement to be followed by our Service.  If we are unable to contact you on repeated occasions, you are at risk of being discharged from the Heart Failure Clinic.     Please reach out to us to make your next appointment as soon as you are able.  We are open Monday-Friday 8 am until 5 pm.  You may reach our Service at [phone].           Sincerely,           Leonardo Soria PharmD, [facility]  Clinic Supervisor  [facility]  Outpatient Anticoagulation Service      Electronically Signed

## 2021-10-11 NOTE — PROGRESS NOTES
Called pt regarding missed f/u CHF pharmacotherapy appt - no answer. LVM to reschedule.     5th attempt, sent compliance letter and [employer] message.    Sony Figueroa, PharmD

## 2021-10-18 ENCOUNTER — DOCUMENTATION (OUTPATIENT)
Dept: VASCULAR LAB | Facility: MEDICAL CENTER | Age: 51
End: 2021-10-18

## 2021-10-18 NOTE — PROGRESS NOTES
[facility]      Called the patient regarding missed f/u in CHF clinic.   LVM to call the clinic back to reschedule.     Letter sent last week. Will attempt to contact patient again at a later time.       Keyonna EdwardsD

## 2021-10-22 ENCOUNTER — TELEPHONE (OUTPATIENT)
Dept: CARDIOLOGY | Facility: MEDICAL CENTER | Age: 51
End: 2021-10-22

## 2021-10-22 NOTE — TELEPHONE ENCOUNTER
FYI-- patient transmitted via home monitor 10/20/21-- patient is having brief episodes of RA lead noise noted.  Implanted 5/2021 and sensing programmed @ 0.30 mv--left message for patient to confirm if any electronics etc. close to the device.

## 2021-11-04 ENCOUNTER — OFFICE VISIT (OUTPATIENT)
Dept: CARDIOLOGY | Facility: MEDICAL CENTER | Age: 51
End: 2021-11-04
Payer: COMMERCIAL

## 2021-11-04 VITALS
HEIGHT: 72 IN | WEIGHT: 246 LBS | OXYGEN SATURATION: 97 % | DIASTOLIC BLOOD PRESSURE: 82 MMHG | BODY MASS INDEX: 33.32 KG/M2 | HEART RATE: 87 BPM | RESPIRATION RATE: 16 BRPM | SYSTOLIC BLOOD PRESSURE: 126 MMHG

## 2021-11-04 DIAGNOSIS — R94.31 PROLONGED Q-T INTERVAL ON ECG: ICD-10-CM

## 2021-11-04 DIAGNOSIS — Z79.899 HIGH RISK MEDICATION USE: ICD-10-CM

## 2021-11-04 DIAGNOSIS — I47.20 VENTRICULAR TACHYARRHYTHMIA (HCC): ICD-10-CM

## 2021-11-04 DIAGNOSIS — Z95.810 ICD (IMPLANTABLE CARDIOVERTER-DEFIBRILLATOR), DUAL, IN SITU: ICD-10-CM

## 2021-11-04 DIAGNOSIS — E87.8 ELECTROLYTE IMBALANCE: ICD-10-CM

## 2021-11-04 DIAGNOSIS — G47.33 OSA (OBSTRUCTIVE SLEEP APNEA): ICD-10-CM

## 2021-11-04 DIAGNOSIS — I48.0 PAROXYSMAL ATRIAL FIBRILLATION (HCC): ICD-10-CM

## 2021-11-04 DIAGNOSIS — I50.21 ACUTE SYSTOLIC HEART FAILURE (HCC): ICD-10-CM

## 2021-11-04 DIAGNOSIS — F10.931 ALCOHOL WITHDRAWAL SYNDROME, WITH DELIRIUM (HCC): ICD-10-CM

## 2021-11-04 DIAGNOSIS — I49.01 VENTRICULAR FIBRILLATION (HCC): ICD-10-CM

## 2021-11-04 PROCEDURE — 99215 OFFICE O/P EST HI 40 MIN: CPT | Performed by: INTERNAL MEDICINE

## 2021-11-04 RX ORDER — NALTREXONE HYDROCHLORIDE 50 MG/1
50 TABLET, FILM COATED ORAL DAILY
COMMUNITY
Start: 2021-10-29 | End: 2021-12-21

## 2021-11-04 RX ORDER — FUROSEMIDE 40 MG/1
40 TABLET ORAL DAILY
COMMUNITY
End: 2021-12-09

## 2021-11-04 ASSESSMENT — ENCOUNTER SYMPTOMS
CLAUDICATION: 0
EYES NEGATIVE: 1
SPUTUM PRODUCTION: 0
CONSTITUTIONAL NEGATIVE: 1
SORE THROAT: 0
DIZZINESS: 0
CHILLS: 0
STRIDOR: 0
LOSS OF CONSCIOUSNESS: 0
PALPITATIONS: 0
PND: 0
GASTROINTESTINAL NEGATIVE: 1
SHORTNESS OF BREATH: 0
CARDIOVASCULAR NEGATIVE: 1
NEUROLOGICAL NEGATIVE: 1
WHEEZING: 0
RESPIRATORY NEGATIVE: 1
FEVER: 0
WEAKNESS: 0
BRUISES/BLEEDS EASILY: 0
MUSCULOSKELETAL NEGATIVE: 1
COUGH: 0
ORTHOPNEA: 0
HEMOPTYSIS: 0

## 2021-11-04 ASSESSMENT — FIBROSIS 4 INDEX: FIB4 SCORE: 0.87

## 2021-11-04 NOTE — PROGRESS NOTES
Chief Complaint   Patient presents with   • CHF (Systolic)   • Atrial Fibrillation       Subjective     Ras Blake is a 51 y.o. male who presents today as a new consultation for heart failure.  He is a 51-year-old male who had ventricular tachycardia arrest and subdural hematoma.  At the time he was drinking a pint of vodka or tequila per day.  His EF is normalized.  He continues on amiodarone.  His most recent TSH reports to be in the 12's but we will have the labs available today.  His symptoms are essentially gone.  He stopped drinking.    History reviewed. No pertinent past medical history.  History reviewed. No pertinent surgical history.  History reviewed. No pertinent family history.  Social History     Socioeconomic History   • Marital status:      Spouse name: Not on file   • Number of children: Not on file   • Years of education: Not on file   • Highest education level: Not on file   Occupational History   • Not on file   Tobacco Use   • Smoking status: Former Smoker     Types: Cigarettes   • Smokeless tobacco: Never Used   Vaping Use   • Vaping Use: Former   • Quit date: 5/14/2021   Substance and Sexual Activity   • Alcohol use: Yes     Comment: Occasionally   • Drug use: No   • Sexual activity: Not on file   Other Topics Concern   • Not on file   Social History Narrative   • Not on file     Social Determinants of Health     Financial Resource Strain:    • Difficulty of Paying Living Expenses:    Food Insecurity:    • Worried About Running Out of Food in the Last Year:    • Ran Out of Food in the Last Year:    Transportation Needs:    • Lack of Transportation (Medical):    • Lack of Transportation (Non-Medical):    Physical Activity:    • Days of Exercise per Week:    • Minutes of Exercise per Session:    Stress:    • Feeling of Stress :    Social Connections:    • Frequency of Communication with Friends and Family:    • Frequency of Social Gatherings with Friends and Family:    • Attends  Shinto Services:    • Active Member of Clubs or Organizations:    • Attends Club or Organization Meetings:    • Marital Status:    Intimate Partner Violence:    • Fear of Current or Ex-Partner:    • Emotionally Abused:    • Physically Abused:    • Sexually Abused:      No Known Allergies  Outpatient Encounter Medications as of 11/4/2021   Medication Sig Dispense Refill   • furosemide (LASIX) 40 MG Tab Take 40 mg by mouth every day.     • naltrexone (DEPADE) 50 MG Tab      • metoprolol SR (TOPROL XL) 25 MG TABLET SR 24 HR Take 1 Tablet by mouth every day. 90 Tablet 3   • levetiracetam (KEPPRA) 1000 MG tablet Take 1,000 mg by mouth 2 times a day.     • losartan (COZAAR) 25 MG Tab Take 1 Tablet by mouth every day. 90 Tablet 3   • eplerenone (INSPRA) 25 MG Tab Take 0.5 Tablets by mouth every morning. 45 tablet 3   • amiodarone (CORDARONE) 200 MG Tab Take 1 tablet by mouth every day. 100 tablet 2   • Dapagliflozin Propanediol 10 MG Tab Take 10 mg by mouth every day. 30 tablet 11   • Ascorbic Acid (VITAMIN C GUMMIE PO) Take 3-4 Tablets by mouth every day.     • multivitamin-iron-minerals-folic acid (CENTRUM) chewable tablet Chew 2 Tablets every day.       No facility-administered encounter medications on file as of 11/4/2021.     Review of Systems   Constitutional: Negative.  Negative for chills, fever and malaise/fatigue.   HENT: Negative.  Negative for sore throat.    Eyes: Negative.    Respiratory: Negative.  Negative for cough, hemoptysis, sputum production, shortness of breath, wheezing and stridor.    Cardiovascular: Negative.  Negative for chest pain, palpitations, orthopnea, claudication, leg swelling and PND.   Gastrointestinal: Negative.    Genitourinary: Negative.    Musculoskeletal: Negative.    Skin: Negative.    Neurological: Negative.  Negative for dizziness, loss of consciousness and weakness.   Endo/Heme/Allergies: Negative.  Does not bruise/bleed easily.   All other systems reviewed and are  negative.             Objective     /82 (BP Location: Left arm, Patient Position: Sitting, BP Cuff Size: Adult)   Pulse 87   Resp 16   Ht 1.829 m (6')   Wt 112 kg (246 lb)   SpO2 97%   BMI 33.36 kg/m²     Physical Exam  Vitals and nursing note reviewed.   Constitutional:       General: He is not in acute distress.     Appearance: He is well-developed. He is not diaphoretic.   HENT:      Head: Normocephalic and atraumatic.      Right Ear: External ear normal.      Left Ear: External ear normal.      Nose: Nose normal.      Mouth/Throat:      Pharynx: No oropharyngeal exudate.   Eyes:      General: No scleral icterus.        Right eye: No discharge.         Left eye: No discharge.      Conjunctiva/sclera: Conjunctivae normal.      Pupils: Pupils are equal, round, and reactive to light.   Neck:      Vascular: No JVD.   Cardiovascular:      Rate and Rhythm: Normal rate and regular rhythm.      Heart sounds: No murmur heard.   No friction rub. No gallop.    Pulmonary:      Effort: Pulmonary effort is normal. No respiratory distress.      Breath sounds: No stridor. No wheezing or rales.   Chest:      Chest wall: No tenderness.   Abdominal:      General: There is no distension.      Palpations: Abdomen is soft.      Tenderness: There is no guarding.   Musculoskeletal:         General: No tenderness or deformity. Normal range of motion.      Cervical back: Neck supple.   Skin:     General: Skin is warm and dry.      Coloration: Skin is not pale.      Findings: No erythema or rash.   Neurological:      Mental Status: He is alert.      Cranial Nerves: No cranial nerve deficit.      Motor: No abnormal muscle tone.      Coordination: Coordination normal.      Deep Tendon Reflexes: Reflexes are normal and symmetric. Reflexes normal.   Psychiatric:         Behavior: Behavior normal.         Thought Content: Thought content normal.         Judgment: Judgment normal.            Echocardiogram: Dated 9/17/2021 personally  viewed under myself showing normal LV systolic function no valvular heart disease.    Echocardiogram: Dated 5/14/2021 personally reviewed and interpreted by myself showing an EF of 35% with no valvular heart disease.    Cardiac catheterization: Dated 5/17/2021 personally viewed inter myself showing no ischemic coronary disease.    Assessment & Plan     1. Alcohol withdrawal syndrome, with delirium (HCC)     2. Paroxysmal atrial fibrillation (HCC)     3. Acute systolic heart failure (HCC)     4. Electrolyte imbalance  CBC W/ DIFF W/O PLATELETS   5. ICD (implantable cardioverter-defibrillator), dual, in situ Medtronic Chrome placed 5/18/21  CBC W/ DIFF W/O PLATELETS   6. MANSOOR (obstructive sleep apnea)  TSH WITH REFLEX TO FT4    Comp Metabolic Panel   7. Ventricular tachyarrhythmia (HCC)  TSH WITH REFLEX TO FT4    Comp Metabolic Panel    CBC W/ DIFF W/O PLATELETS   8. Ventricular fibrillation (HCC)  TSH WITH REFLEX TO FT4    Comp Metabolic Panel    CBC W/ DIFF W/O PLATELETS   9. Prolonged Q-T interval on ECG  TSH WITH REFLEX TO FT4    Comp Metabolic Panel    CBC W/ DIFF W/O PLATELETS   10. High risk medication use  TSH WITH REFLEX TO FT4    Comp Metabolic Panel       Medical Decision Making: Today's Assessment/Status/Plan:        51-year-old male with heart failure with reduced ejection fraction alcohol abuse now alcohol free with a now normalized heart function.  We will repeat his TSH.  Continues to be elevated we will stop his amiodarone.  I think that his VT was probably from heart failure and not from a secondary process.  We did discuss the role of de-escalation of medical therapy but I would like him to be treated for least 6 to 12 months with the exception of amiodarone.  We will check his labs.  I will see him back shortly.

## 2021-11-12 DIAGNOSIS — I49.01 VENTRICULAR FIBRILLATION (HCC): ICD-10-CM

## 2021-11-12 DIAGNOSIS — Z79.899 HIGH RISK MEDICATION USE: ICD-10-CM

## 2021-11-12 DIAGNOSIS — I50.20 ACC/AHA STAGE C SYSTOLIC HEART FAILURE (HCC): ICD-10-CM

## 2021-11-12 DIAGNOSIS — I10 ESSENTIAL HYPERTENSION, BENIGN: ICD-10-CM

## 2021-11-12 RX ORDER — DAPAGLIFLOZIN 10 MG/1
10 TABLET, FILM COATED ORAL DAILY
Qty: 90 TABLET | Refills: 3 | Status: SHIPPED
Start: 2021-11-12 | End: 2021-12-21

## 2021-11-12 RX ORDER — EPLERENONE 25 MG/1
12.5 TABLET, FILM COATED ORAL EVERY MORNING
Qty: 45 TABLET | Refills: 3 | Status: SHIPPED
Start: 2021-11-12 | End: 2021-12-21

## 2021-11-15 ENCOUNTER — HOSPITAL ENCOUNTER (OUTPATIENT)
Dept: LAB | Facility: MEDICAL CENTER | Age: 51
End: 2021-11-15
Attending: INTERNAL MEDICINE
Payer: COMMERCIAL

## 2021-11-15 ENCOUNTER — NON-PROVIDER VISIT (OUTPATIENT)
Dept: CARDIOLOGY | Facility: MEDICAL CENTER | Age: 51
End: 2021-11-15
Payer: COMMERCIAL

## 2021-11-15 VITALS
DIASTOLIC BLOOD PRESSURE: 68 MMHG | WEIGHT: 240 LBS | HEIGHT: 72 IN | RESPIRATION RATE: 16 BRPM | BODY MASS INDEX: 32.51 KG/M2 | HEART RATE: 72 BPM | SYSTOLIC BLOOD PRESSURE: 110 MMHG | OXYGEN SATURATION: 98 %

## 2021-11-15 DIAGNOSIS — R94.31 PROLONGED Q-T INTERVAL ON ECG: ICD-10-CM

## 2021-11-15 DIAGNOSIS — G47.33 OSA (OBSTRUCTIVE SLEEP APNEA): ICD-10-CM

## 2021-11-15 DIAGNOSIS — I47.20 VENTRICULAR TACHYARRHYTHMIA (HCC): ICD-10-CM

## 2021-11-15 DIAGNOSIS — E87.8 ELECTROLYTE IMBALANCE: ICD-10-CM

## 2021-11-15 DIAGNOSIS — I49.01 VENTRICULAR FIBRILLATION (HCC): ICD-10-CM

## 2021-11-15 DIAGNOSIS — I48.0 PAROXYSMAL ATRIAL FIBRILLATION (HCC): ICD-10-CM

## 2021-11-15 DIAGNOSIS — Z95.810 ICD (IMPLANTABLE CARDIOVERTER-DEFIBRILLATOR), DUAL, IN SITU: ICD-10-CM

## 2021-11-15 DIAGNOSIS — Z79.899 HIGH RISK MEDICATION USE: ICD-10-CM

## 2021-11-15 LAB
ALBUMIN SERPL BCP-MCNC: 4.4 G/DL (ref 3.2–4.9)
ALBUMIN/GLOB SERPL: 1.3 G/DL
ALP SERPL-CCNC: 100 U/L (ref 30–99)
ALT SERPL-CCNC: 13 U/L (ref 2–50)
ANION GAP SERPL CALC-SCNC: 11 MMOL/L (ref 7–16)
AST SERPL-CCNC: 25 U/L (ref 12–45)
BASOPHILS # BLD AUTO: 0.9 % (ref 0–1.8)
BASOPHILS # BLD: 0.06 K/UL (ref 0–0.12)
BILIRUB SERPL-MCNC: 0.4 MG/DL (ref 0.1–1.5)
BUN SERPL-MCNC: 16 MG/DL (ref 8–22)
CALCIUM SERPL-MCNC: 9.3 MG/DL (ref 8.5–10.5)
CHLORIDE SERPL-SCNC: 99 MMOL/L (ref 96–112)
CO2 SERPL-SCNC: 29 MMOL/L (ref 20–33)
CREAT SERPL-MCNC: 1.11 MG/DL (ref 0.5–1.4)
EOSINOPHIL # BLD AUTO: 0.46 K/UL (ref 0–0.51)
EOSINOPHIL NFR BLD: 6.5 % (ref 0–6.9)
ERYTHROCYTE [DISTWIDTH] IN BLOOD BY AUTOMATED COUNT: 50.4 FL (ref 35.9–50)
GLOBULIN SER CALC-MCNC: 3.3 G/DL (ref 1.9–3.5)
GLUCOSE SERPL-MCNC: 83 MG/DL (ref 65–99)
HCT VFR BLD AUTO: 48.3 % (ref 42–52)
HGB BLD-MCNC: 15.5 G/DL (ref 14–18)
IMM GRANULOCYTES # BLD AUTO: 0.03 K/UL (ref 0–0.11)
IMM GRANULOCYTES NFR BLD AUTO: 0.4 % (ref 0–0.9)
LYMPHOCYTES # BLD AUTO: 1.05 K/UL (ref 1–4.8)
LYMPHOCYTES NFR BLD: 14.9 % (ref 22–41)
MCH RBC QN AUTO: 31.5 PG (ref 27–33)
MCHC RBC AUTO-ENTMCNC: 32.1 G/DL (ref 33.7–35.3)
MCV RBC AUTO: 98.2 FL (ref 81.4–97.8)
MONOCYTES # BLD AUTO: 0.66 K/UL (ref 0–0.85)
MONOCYTES NFR BLD AUTO: 9.4 % (ref 0–13.4)
NEUTROPHILS # BLD AUTO: 4.79 K/UL (ref 1.82–7.42)
NEUTROPHILS NFR BLD: 67.9 % (ref 44–72)
NRBC # BLD AUTO: 0 K/UL
NRBC BLD-RTO: 0 /100 WBC
POTASSIUM SERPL-SCNC: 4.7 MMOL/L (ref 3.6–5.5)
PROT SERPL-MCNC: 7.7 G/DL (ref 6–8.2)
RBC # BLD AUTO: 4.92 M/UL (ref 4.7–6.1)
SODIUM SERPL-SCNC: 139 MMOL/L (ref 135–145)
T4 FREE SERPL-MCNC: 1.04 NG/DL (ref 0.93–1.7)
TSH SERPL DL<=0.005 MIU/L-ACNC: 9.66 UIU/ML (ref 0.38–5.33)
WBC # BLD AUTO: 7.1 K/UL (ref 4.8–10.8)

## 2021-11-15 PROCEDURE — 85048 AUTOMATED LEUKOCYTE COUNT: CPT

## 2021-11-15 PROCEDURE — 85018 HEMOGLOBIN: CPT

## 2021-11-15 PROCEDURE — 93283 PRGRMG EVAL IMPLANTABLE DFB: CPT | Performed by: NURSE PRACTITIONER

## 2021-11-15 PROCEDURE — 84443 ASSAY THYROID STIM HORMONE: CPT

## 2021-11-15 PROCEDURE — 80053 COMPREHEN METABOLIC PANEL: CPT

## 2021-11-15 PROCEDURE — 85014 HEMATOCRIT: CPT

## 2021-11-15 PROCEDURE — 84439 ASSAY OF FREE THYROXINE: CPT

## 2021-11-15 PROCEDURE — 85041 AUTOMATED RBC COUNT: CPT

## 2021-11-15 PROCEDURE — 36415 COLL VENOUS BLD VENIPUNCTURE: CPT

## 2021-11-15 RX ORDER — METOPROLOL SUCCINATE 50 MG/1
TABLET, EXTENDED RELEASE ORAL
COMMUNITY
Start: 2021-10-16 | End: 2021-11-15

## 2021-11-15 ASSESSMENT — FIBROSIS 4 INDEX: FIB4 SCORE: 0.87

## 2021-11-15 NOTE — PROGRESS NOTES
Device is working normally.  No device therapy.  8 brief mode switching episodes (all <1 minutes, <0.1% of total time).  Normal sensing and capture of RA and RV leads; stable impedances. Battery longevity is 12.2 years.     Changes today include turning on Rate Response (AAIR-DDDR)    FU in 6 months for next AICD check with me.

## 2021-11-17 ENCOUNTER — PATIENT MESSAGE (OUTPATIENT)
Dept: CARDIOLOGY | Facility: MEDICAL CENTER | Age: 51
End: 2021-11-17

## 2021-11-17 DIAGNOSIS — Z79.899 HIGH RISK MEDICATION USE: ICD-10-CM

## 2021-12-01 ENCOUNTER — OFFICE VISIT (OUTPATIENT)
Dept: ENDOCRINOLOGY | Facility: MEDICAL CENTER | Age: 51
End: 2021-12-01
Attending: INTERNAL MEDICINE
Payer: COMMERCIAL

## 2021-12-01 VITALS
WEIGHT: 233.8 LBS | BODY MASS INDEX: 31.67 KG/M2 | OXYGEN SATURATION: 99 % | RESPIRATION RATE: 16 BRPM | SYSTOLIC BLOOD PRESSURE: 116 MMHG | HEIGHT: 72 IN | DIASTOLIC BLOOD PRESSURE: 76 MMHG | HEART RATE: 96 BPM

## 2021-12-01 DIAGNOSIS — E29.1 HYPOGONADISM IN MALE: ICD-10-CM

## 2021-12-01 DIAGNOSIS — T46.2X5A ADVERSE EFFECT OF AMIODARONE, INITIAL ENCOUNTER: ICD-10-CM

## 2021-12-01 DIAGNOSIS — E03.8 SUBCLINICAL HYPOTHYROIDISM: ICD-10-CM

## 2021-12-01 PROCEDURE — 99211 OFF/OP EST MAY X REQ PHY/QHP: CPT | Performed by: INTERNAL MEDICINE

## 2021-12-01 PROCEDURE — 99205 OFFICE O/P NEW HI 60 MIN: CPT | Performed by: INTERNAL MEDICINE

## 2021-12-01 RX ORDER — LEVOTHYROXINE SODIUM 50 MCG
50 TABLET ORAL
Qty: 30 TABLET | Refills: 5 | Status: SHIPPED | OUTPATIENT
Start: 2021-12-01 | End: 2022-02-14 | Stop reason: SDUPTHER

## 2021-12-01 RX ORDER — FUROSEMIDE 40 MG/1
40 TABLET ORAL
COMMUNITY
End: 2021-12-09

## 2021-12-01 RX ORDER — DAPAGLIFLOZIN 10 MG/1
10 TABLET, FILM COATED ORAL
COMMUNITY
End: 2021-12-09

## 2021-12-01 RX ORDER — EPLERENONE 25 MG/1
12.5 TABLET, FILM COATED ORAL DAILY
COMMUNITY
Start: 2021-11-12 | End: 2021-12-09

## 2021-12-01 ASSESSMENT — FIBROSIS 4 INDEX: FIB4 SCORE: 1.79

## 2021-12-02 ENCOUNTER — HOSPITAL ENCOUNTER (OUTPATIENT)
Dept: LAB | Facility: MEDICAL CENTER | Age: 51
End: 2021-12-02
Attending: INTERNAL MEDICINE
Payer: COMMERCIAL

## 2021-12-02 ENCOUNTER — HOSPITAL ENCOUNTER (OUTPATIENT)
Dept: LAB | Facility: MEDICAL CENTER | Age: 51
End: 2021-12-02
Attending: NURSE PRACTITIONER
Payer: COMMERCIAL

## 2021-12-02 DIAGNOSIS — T46.2X5A ADVERSE EFFECT OF AMIODARONE, INITIAL ENCOUNTER: ICD-10-CM

## 2021-12-02 DIAGNOSIS — E29.1 HYPOGONADISM IN MALE: ICD-10-CM

## 2021-12-02 DIAGNOSIS — E03.8 SUBCLINICAL HYPOTHYROIDISM: ICD-10-CM

## 2021-12-02 LAB
ALBUMIN SERPL BCP-MCNC: 4.1 G/DL (ref 3.2–4.9)
ALBUMIN/GLOB SERPL: 1.6 G/DL
ALP SERPL-CCNC: 76 U/L (ref 30–99)
ALT SERPL-CCNC: 12 U/L (ref 2–50)
AMYLASE SERPL-CCNC: 67 U/L (ref 20–103)
ANION GAP SERPL CALC-SCNC: 10 MMOL/L (ref 7–16)
AST SERPL-CCNC: 11 U/L (ref 12–45)
BASOPHILS # BLD AUTO: 1.1 % (ref 0–1.8)
BASOPHILS # BLD: 0.07 K/UL (ref 0–0.12)
BILIRUB SERPL-MCNC: 0.2 MG/DL (ref 0.1–1.5)
BUN SERPL-MCNC: 24 MG/DL (ref 8–22)
CALCIUM SERPL-MCNC: 8.8 MG/DL (ref 8.5–10.5)
CHLORIDE SERPL-SCNC: 104 MMOL/L (ref 96–112)
CO2 SERPL-SCNC: 26 MMOL/L (ref 20–33)
CREAT SERPL-MCNC: 1.03 MG/DL (ref 0.5–1.4)
EOSINOPHIL # BLD AUTO: 0.51 K/UL (ref 0–0.51)
EOSINOPHIL NFR BLD: 7.8 % (ref 0–6.9)
ERYTHROCYTE [DISTWIDTH] IN BLOOD BY AUTOMATED COUNT: 48.5 FL (ref 35.9–50)
FASTING STATUS PATIENT QL REPORTED: NORMAL
FERRITIN SERPL-MCNC: 182 NG/ML (ref 22–322)
FSH SERPL-ACNC: 4.3 MIU/ML (ref 1.5–12.4)
GLOBULIN SER CALC-MCNC: 2.5 G/DL (ref 1.9–3.5)
GLUCOSE SERPL-MCNC: 98 MG/DL (ref 65–99)
HCT VFR BLD AUTO: 41.9 % (ref 42–52)
HCT VFR BLD AUTO: 42.4 % (ref 42–52)
HGB BLD-MCNC: 13.8 G/DL (ref 14–18)
HGB BLD-MCNC: 14 G/DL (ref 14–18)
IMM GRANULOCYTES # BLD AUTO: 0.03 K/UL (ref 0–0.11)
IMM GRANULOCYTES NFR BLD AUTO: 0.5 % (ref 0–0.9)
IRON SATN MFR SERPL: 10 % (ref 15–55)
IRON SERPL-MCNC: 28 UG/DL (ref 50–180)
LH SERPL-ACNC: 6.4 IU/L (ref 1.7–8.6)
LIPASE SERPL-CCNC: 53 U/L (ref 11–82)
LYMPHOCYTES # BLD AUTO: 1.04 K/UL (ref 1–4.8)
LYMPHOCYTES NFR BLD: 15.9 % (ref 22–41)
MCH RBC QN AUTO: 31.7 PG (ref 27–33)
MCHC RBC AUTO-ENTMCNC: 33 G/DL (ref 33.7–35.3)
MCV RBC AUTO: 95.9 FL (ref 81.4–97.8)
MONOCYTES # BLD AUTO: 0.74 K/UL (ref 0–0.85)
MONOCYTES NFR BLD AUTO: 11.3 % (ref 0–13.4)
NEUTROPHILS # BLD AUTO: 4.15 K/UL (ref 1.82–7.42)
NEUTROPHILS NFR BLD: 63.4 % (ref 44–72)
NRBC # BLD AUTO: 0 K/UL
NRBC BLD-RTO: 0 /100 WBC
PLATELET # BLD AUTO: 289 K/UL (ref 164–446)
PMV BLD AUTO: 9.6 FL (ref 9–12.9)
POTASSIUM SERPL-SCNC: 4.4 MMOL/L (ref 3.6–5.5)
PROLACTIN SERPL-MCNC: 14.7 NG/ML (ref 2.1–17.7)
PROT SERPL-MCNC: 6.6 G/DL (ref 6–8.2)
RBC # BLD AUTO: 4.42 M/UL (ref 4.7–6.1)
SODIUM SERPL-SCNC: 140 MMOL/L (ref 135–145)
THYROPEROXIDASE AB SERPL-ACNC: 11 IU/ML (ref 0–9)
TIBC SERPL-MCNC: 284 UG/DL (ref 250–450)
UIBC SERPL-MCNC: 256 UG/DL (ref 110–370)
WBC # BLD AUTO: 6.5 K/UL (ref 4.8–10.8)

## 2021-12-02 PROCEDURE — 85014 HEMATOCRIT: CPT

## 2021-12-02 PROCEDURE — 84403 ASSAY OF TOTAL TESTOSTERONE: CPT

## 2021-12-02 PROCEDURE — 83001 ASSAY OF GONADOTROPIN (FSH): CPT

## 2021-12-02 PROCEDURE — 82150 ASSAY OF AMYLASE: CPT

## 2021-12-02 PROCEDURE — 80053 COMPREHEN METABOLIC PANEL: CPT

## 2021-12-02 PROCEDURE — 85025 COMPLETE CBC W/AUTO DIFF WBC: CPT

## 2021-12-02 PROCEDURE — 82728 ASSAY OF FERRITIN: CPT

## 2021-12-02 PROCEDURE — 83540 ASSAY OF IRON: CPT

## 2021-12-02 PROCEDURE — 83550 IRON BINDING TEST: CPT

## 2021-12-02 PROCEDURE — 36415 COLL VENOUS BLD VENIPUNCTURE: CPT

## 2021-12-02 PROCEDURE — 86376 MICROSOMAL ANTIBODY EACH: CPT

## 2021-12-02 PROCEDURE — 84402 ASSAY OF FREE TESTOSTERONE: CPT

## 2021-12-02 PROCEDURE — 84146 ASSAY OF PROLACTIN: CPT

## 2021-12-02 PROCEDURE — 83002 ASSAY OF GONADOTROPIN (LH): CPT

## 2021-12-02 PROCEDURE — 83690 ASSAY OF LIPASE: CPT

## 2021-12-02 PROCEDURE — 85018 HEMOGLOBIN: CPT

## 2021-12-02 PROCEDURE — 84270 ASSAY OF SEX HORMONE GLOBUL: CPT

## 2021-12-02 NOTE — PROGRESS NOTES
Chief Complaint: Consult requested by Ean Macario M.D. for evaluation of subclinical hypothyroidism and history of amiodarone therapy    HPI:     Ross Blake is a 51 y.o. male with history of newly diagnosed subclinical hyperthyroidism.  Comorbid issues include alcoholic related cardiomyopathy resulting in acute systolic heart failure now improved he is status post ICD placement on May 18, 2021, obesity, sleep apnea.    He is being followed by Dr. Duke.  Most recent EF is improved at 65% he was started on amiodarone because of history of V. fib his baseline TSH levels were normal prior to amiodarone initiation    His most recent TSH however on November 15, 2021 was abnormal at 9.6 with normal free T4 levels    Interestingly he also has a family history of hypothyroidism with mom, maternal grandmother maternal grandfather and cousins.    He reports symptoms of hypothyroidism such as hair loss, loss of eyebrow hair, constipation, dry skin, fatigue  He currently denies acute heart failure symptoms such as PND, and exertional dyspnea or dyspnea at rest.  He denies severe leg swelling.    He is currently not on thyroid hormone replacement therapy but is interested in starting it in light of his symptoms      I also discovered on chart review that he had low testosterone levels first discovered on March 15, 2021.  He admits that he was drinking at that time but he was not acutely ill.    Baseline morning total testosterone was less than 200 with SHBG of 45, LH and FSH was not measured.    He is currently not on androgen replacement therapy and his testosterone levels have not been repeated after the initial baseline abnormal level.          Patient's medications, allergies, and social histories were reviewed and updated as appropriate.      ROS:     CONS:     No fever, no chills, no weight loss, reports fatigue   EYES:      No diplopia, no blurry vision, no redness of eyes, no swelling of eyelids   ENT:     No hearing loss, No ear pain, No sore throat, no dysphagia, no neck swelling   CV:     No chest pain, no palpitations, no claudication, no orthopnea, no PND   PULM:    No SOB, no cough, no hemoptysis, no wheezing    GI:   No nausea, no vomiting, no diarrhea, reports constipation, no bloody stools   :  Passing urine well, no dysuria, no hematuria   ENDO:   No polyuria, no polydipsia, no heat intolerance, no cold intolerance   NEURO: No headaches, no dizziness, no convulsions, no tremors   MUSC:  No joint swellings, no arthralgias, no myalgias, no weakness   SKIN:   No rash, no ulcers, reports dry skin   PSYCH:   No depression, no anxiety, no difficulty sleeping       Past Medical History:  Patient Active Problem List    Diagnosis Date Noted   • Subclinical hypothyroidism 12/01/2021   • High risk medication use 11/04/2021   • ICD (implantable cardioverter-defibrillator), dual, in situ Med[employer] Chrome placed 5/18/21 06/24/2021   • Electrolyte imbalance 05/17/2021   • Ventricular tachyarrhythmia (HCC) 05/16/2021   • MANSOOR (obstructive sleep apnea) 05/16/2021   • Alcohol withdrawal ([facility]) 05/15/2021   • Ventricular fibrillation ([facility]) 05/15/2021   • Prolonged Q-T interval on ECG 05/15/2021   • Acute systolic heart failure ([facility]) 05/14/2021   • A-fib ([facility]) 05/14/2021   • Acute respiratory failure with hypoxia ([facility]) 05/14/2021   • Obesity (BMI 30-39.9) 07/05/2018       Past Surgical History:  History reviewed. No pertinent surgical history.     Allergies:  Patient has no known allergies.     Current Medications:    Current Outpatient Medications:   •  Dapagliflozin Propanediol 10 MG Tab, Take 10 mg by mouth., Disp: , Rfl:   •  eplerenone (INSPRA) 25 MG Tab, Take 12.5 mg by mouth every day., Disp: , Rfl:   •  SYNTHROID 50 MCG Tab, Take 1 Tablet by mouth every morning on an empty stomach., Disp: 30 Tablet, Rfl: 5  •  Dapagliflozin Propanediol 10 MG Tab, Take 10 mg by mouth every day., Disp: 90 Tablet, Rfl: 3  •  eplerenone  (INSPRA) 25 MG Tab, Take 0.5 Tablets by mouth every morning., Disp: 45 Tablet, Rfl: 3  •  naltrexone (DEPADE) 50 MG Tab, Take 50 mg by mouth every day., Disp: , Rfl:   •  metoprolol SR (TOPROL XL) 25 MG TABLET SR 24 HR, Take 1 Tablet by mouth every day., Disp: 90 Tablet, Rfl: 3  •  levetiracetam (KEPPRA) 1000 MG tablet, Take 1,000 mg by mouth 2 times a day., Disp: , Rfl:   •  losartan (COZAAR) 25 MG Tab, Take 1 Tablet by mouth every day., Disp: 90 Tablet, Rfl: 3  •  amiodarone (CORDARONE) 200 MG Tab, Take 1 tablet by mouth every day., Disp: 100 tablet, Rfl: 2  •  Ascorbic Acid (VITAMIN C GUMMIE PO), Take 3-4 Tablets by mouth every day., Disp: , Rfl:   •  multivitamin-iron-minerals-folic acid (CENTRUM) chewable tablet, Chew 2 Tablets every day., Disp: , Rfl:   •  furosemide (LASIX) 40 MG Tab, Take 40 mg by mouth. (Patient not taking: Reported on 12/1/2021), Disp: , Rfl:   •  furosemide (LASIX) 40 MG Tab, Take 40 mg by mouth every day. (Patient not taking: Reported on 12/1/2021), Disp: , Rfl:     Social History:  Social History     Socioeconomic History   • Marital status:      Spouse name: Not on file   • Number of children: Not on file   • Years of education: Not on file   • Highest education level: Not on file   Occupational History   • Not on file   Tobacco Use   • Smoking status: Former Smoker     Types: Cigarettes   • Smokeless tobacco: Never Used   Vaping Use   • Vaping Use: Former   • Quit date: 5/14/2021   Substance and Sexual Activity   • Alcohol use: Not Currently     Comment: Occasionally   • Drug use: Yes     Types: Marijuana     Comment: RARE   • Sexual activity: Not on file   Other Topics Concern   • Not on file   Social History Narrative   • Not on file     Social Determinants of Health     Financial Resource Strain:    • Difficulty of Paying Living Expenses: Not on file   Food Insecurity:    • Worried About Running Out of Food in the Last Year: Not on file   • Ran Out of Food in the Last Year:  Not on file   Transportation Needs:    • Lack of Transportation (Medical): Not on file   • Lack of Transportation (Non-Medical): Not on file   Physical Activity:    • Days of Exercise per Week: Not on file   • Minutes of Exercise per Session: Not on file   Stress:    • Feeling of Stress : Not on file   Social Connections:    • Frequency of Communication with Friends and Family: Not on file   • Frequency of Social Gatherings with Friends and Family: Not on file   • Attends Yazidism Services: Not on file   • Active Member of Clubs or Organizations: Not on file   • Attends Club or Organization Meetings: Not on file   • Marital Status: Not on file   Intimate Partner Violence:    • Fear of Current or Ex-Partner: Not on file   • Emotionally Abused: Not on file   • Physically Abused: Not on file   • Sexually Abused: Not on file   Housing Stability:    • Unable to Pay for Housing in the Last Year: Not on file   • Number of Places Lived in the Last Year: Not on file   • Unstable Housing in the Last Year: Not on file        Family History:   History reviewed. No pertinent family history.      PHYSICAL EXAM:   Vital signs: /76 (BP Location: Left arm, Patient Position: Sitting, BP Cuff Size: Adult)   Pulse 96   Resp 16   Ht 1.829 m (6')   Wt 106 kg (233 lb 12.8 oz)   SpO2 99%   BMI 31.71 kg/m²   GENERAL: Well-developed, well-nourished  in no apparent distress.   EYE: No ocular and eyelid asymmetry, Anicteric sclerae,  PERRL  HENT: Hearing grossly intact, Normocephalic, atraumatic. Pink, moist mucous membranes, No exudate  NECK: Supple. Trachea midline.  Thyroid is slightly enlarged  CARDIOVASCULAR: Regular rate and rhythm. No murmurs, rubs, or gallops.   LUNGS: Clear to auscultation bilaterally   ABDOMEN: Soft, nontender with positive bowel sounds.   EXTREMITIES: No clubbing, cyanosis, or edema.   NEUROLOGICAL: Cranial nerves II-XII are grossly intact   Symmetric reflexes at the patella no proximal muscle  weakness  LYMPH: No cervical, supraclavicular,  adenopathy palpated.   SKIN: No rashes, lesions. Turgor is normal.    Labs:  Lab Results   Component Value Date/Time    WBC 7.1 11/15/2021 11:32 AM    RBC 4.92 11/15/2021 11:32 AM    HEMOGLOBIN 15.5 11/15/2021 11:32 AM    MCV 98.2 (H) 11/15/2021 11:32 AM    MCH 31.5 11/15/2021 11:32 AM    MCHC 32.1 (L) 11/15/2021 11:32 AM    RDW 50.4 (H) 11/15/2021 11:32 AM    MPV 10.0 05/20/2021 05:28 AM       Lab Results   Component Value Date/Time    SODIUM 139 11/15/2021 11:32 AM    POTASSIUM 4.7 11/15/2021 11:32 AM    CHLORIDE 99 11/15/2021 11:32 AM    CO2 29 11/15/2021 11:32 AM    ANION 11.0 11/15/2021 11:32 AM    GLUCOSE 83 11/15/2021 11:32 AM    BUN 16 11/15/2021 11:32 AM    CREATININE 1.11 11/15/2021 11:32 AM    CALCIUM 9.3 11/15/2021 11:32 AM    ASTSGOT 25 11/15/2021 11:32 AM    ALTSGPT 13 11/15/2021 11:32 AM    TBILIRUBIN 0.4 11/15/2021 11:32 AM    ALBUMIN 4.4 11/15/2021 11:32 AM    TOTPROTEIN 7.7 11/15/2021 11:32 AM    GLOBULIN 3.3 11/15/2021 11:32 AM    AGRATIO 1.3 11/15/2021 11:32 AM       No results found for: CHOLSTRLTOT, TRIGLYCERIDE, HDL, LDL, CHOLHDLRAT, NONHDL    Lab Results   Component Value Date/Time    TSHULTRASEN 9.660 (H) 11/15/2021 1132     Lab Results   Component Value Date/Time    FREET4 1.04 11/15/2021 1132     Lab Results   Component Value Date/Time    FREET3 3.76 03/15/2021 0710     No results found for: THYSTIMIG    No results found for: MICROSOMALA      Imaging:      ASSESSMENT/PLAN:     1. Subclinical hypothyroidism  Unstable  Reviewed pathogenesis of hypothyroidism which in this case might be multifactorial as he has a family history of thyroid disease and he has been on amiodarone therapy which can cause thyroid dysfunction.  Because of the increased risk for cardiovascular disease with subclinical hypothyroidism especially light of his heart failure recommend starting thyroid hormone replacement therapy with Synthroid 50 MCG daily  Reviewed how to  properly take Synthroid  I am also getting TPO antibodies today and I am scheduling him for a neck ultrasound in December 2021  I want him to repeat thyroid labs in 3 months and in 6 months  And I will update him and make recommendations  I want him to follow-up with me in 6 months    2. Adverse effect of amiodarone, initial encounter  This may be contributing to his hypothyroidism we discussed that amiodarone can cause thyroid dysfunction  In countries were iodine is sufficient majority of the time amiodarone causes hypothyroidism    3. Hypogonadism in male  Unstable  He has a remote history of low testosterone levels  We discussed to alcohol intake can affect testosterone levels  He is currently sober  I recommended we repeat his androgen levels and measure a total testosterone, LH, FSH, SHBG, and hematocrit, prolactin, iron, ferritin   and I will update him and make further recommendations      Return in about 6 months (around 6/1/2022).       This patient during there office visit was started on new medication.  Side effects of new medications were discussed with the patient today in the office. The patient was supplied paperwork on this new medication.    Total time spent on date of service was over 60 minutes which included explaining the pathogenesis of hypothyroidism, reviewing therapy, reviewing how to properly take thyroid hormone, reviewing his labs in chart, reviewing diagnosis of hypogonadism reviewing the work-up for hypogonadism slight overview on the therapy for hypogonadism and androgen replacement therapy, ordering labs, courting care and scheduling future follow-up    Thank you kindly for allowing me to participate in the thyroid care plan for this patient.    Luther Mercado MD, [facility], [facility]  12/01/21    CC:   Ean Macario M.D.

## 2021-12-04 LAB
SHBG SERPL-SCNC: 40 NMOL/L (ref 11–80)
TESTOST FREE MFR SERPL: 1.8 % (ref 1.6–2.9)
TESTOST FREE SERPL-MCNC: 123 PG/ML (ref 47–244)
TESTOST SERPL-MCNC: 689 NG/DL (ref 300–890)

## 2021-12-09 ENCOUNTER — NON-PROVIDER VISIT (OUTPATIENT)
Dept: CARDIOLOGY | Facility: MEDICAL CENTER | Age: 51
End: 2021-12-09
Payer: COMMERCIAL

## 2021-12-09 VITALS
BODY MASS INDEX: 32.55 KG/M2 | DIASTOLIC BLOOD PRESSURE: 81 MMHG | SYSTOLIC BLOOD PRESSURE: 122 MMHG | HEART RATE: 72 BPM | WEIGHT: 240 LBS

## 2021-12-09 DIAGNOSIS — I50.20 ACC/AHA STAGE C SYSTOLIC HEART FAILURE (HCC): ICD-10-CM

## 2021-12-09 DIAGNOSIS — I48.0 PAROXYSMAL ATRIAL FIBRILLATION (HCC): ICD-10-CM

## 2021-12-09 PROCEDURE — 99211 OFF/OP EST MAY X REQ PHY/QHP: CPT | Performed by: INTERNAL MEDICINE

## 2021-12-09 RX ORDER — SILDENAFIL 100 MG/1
100 TABLET, FILM COATED ORAL PRN
COMMUNITY
Start: 2021-11-30 | End: 2021-12-21

## 2021-12-09 RX ORDER — FUROSEMIDE 20 MG/1
20 TABLET ORAL
Qty: 90 TABLET | Refills: 3 | Status: SHIPPED | OUTPATIENT
Start: 2021-12-09 | End: 2022-01-31 | Stop reason: SDUPTHER

## 2021-12-09 ASSESSMENT — FIBROSIS 4 INDEX: FIB4 SCORE: 0.54

## 2021-12-10 NOTE — PROGRESS NOTES
CHF Pharmacotherapy visit - Visit    Date of Service: 12/09/21    Time In: 1630  Time Out: 1700    Ross Blake is here for CHF     HPI  Pertinent Interval History since last visit:   • Pt was unable to tolerate metoprolol SR 50 mg daily d/t dizziness  • Pt was admitted to [facility] after suffering from SDH from GLF after syncope in Aug 2021  • He was diagnosed with hypothyroidism by endo and started on Synthroid  • Pt was readmitted to [facility] for pancreatitis d/t pancreatic pseudocyst on 11/21/21  • He was switched from valsartan to losartan, which has resolved his dizziness  • He was also started on eplerenone and denies any s/s of gynocomastia    Most recent EF:  65% 9/2021 improved from 35% on 5/14/21      Current Outpatient Medications:   •  furosemide, 20 mg, Oral, QDAY PRN  •  sildenafil citrate, 100 mg, Oral, PRN  •  Synthroid, 50 mcg, Oral, AM ES  •  Dapagliflozin Propanediol, 10 mg, Oral, DAILY (Patient not taking: Reported on 12/9/2021), Not Taking  •  eplerenone, 12.5 mg, Oral, QAM  •  naltrexone, 50 mg, Oral, DAILY  •  metoprolol SR, 25 mg, Oral, DAILY  •  levetiracetam, 1,000 mg, Oral, BID  •  losartan, 25 mg, Oral, DAILY  •  amiodarone, 200 mg, Oral, DAILY  •  Ascorbic Acid (VITAMIN C GUMMIE PO), 3-4 Tablet, Oral, DAILY  •  multivitamin-iron-minerals-folic acid, 2 Tablet, Oral, DAILY    Current Adherence to CHF Therapies:  Complete    Current CHF Medications - including dose:   • Entresto or ACE/ARB: losartan 25 mg daily  • Beta blocker: metoprolol SR 25 mg daily - unable to tolerate 50 mg daily  • Diuretic: 40 mg daily PRN  • Aldosterone antagonist: Eplerenone 12.5 mg daily  • Farxiga 10 mg daily    Vitals:    12/09/21 1713   BP: 122/81   Pulse: 72       Change in weight: Stable    Exercise habits: moderate regular exercise program     Diet: low sodium    SOCIAL HISTORY  Social History     Tobacco Use   Smoking Status Former Smoker   • Types: Cigarettes   Smokeless Tobacco Never Used     "    DATA REVIEW  No results found for: HBA1C       No results found for: CHOLSTRLTOT, LDL, HDL, TRIGLYCERIDE    Lab Results   Component Value Date/Time    SODIUM 140 12/02/2021 09:13 AM    POTASSIUM 4.4 12/02/2021 09:13 AM    CHLORIDE 104 12/02/2021 09:13 AM    CO2 26 12/02/2021 09:13 AM    GLUCOSE 98 12/02/2021 09:13 AM    BUN 24 (H) 12/02/2021 09:13 AM    CREATININE 1.03 12/02/2021 09:13 AM     Lab Results   Component Value Date/Time    ALKPHOSPHAT 76 12/02/2021 09:13 AM    ASTSGOT 11 (L) 12/02/2021 09:13 AM    ALTSGPT 12 12/02/2021 09:13 AM    TBILIRUBIN 0.2 12/02/2021 09:13 AM    INR 1.27 (H) 05/17/2021 03:20 AM    ALBUMIN 4.1 12/02/2021 09:13 AM      No components found for: MICROALBUMINCREATRATIOURINE    Renal function:  Calculated creatinine clearance: >100 ml/min     Other Pertinent Blood Work:     Other:  Immunization History   Administered Date(s) Administered   • Influenza (IM) Preservative Free - HISTORICAL DATA 10/18/2016, 12/07/2017   • Influenza Seasonal Injectable - Historical Data 09/11/2014   • Influenza Vaccine Quad Inj (Pf) 10/10/2018   • Influenza, Unspecified - HISTORICAL DATA 11/11/2019   • Pfizer SARS-CoV-2 Vaccine 12+ 04/23/2021, 06/17/2021     Up to date on pneumococcal vaccine? No - address at f/u    Recent Imaging Studies:    Recent imaging studies, including ECHO, were available in EMR and reviewed with patient at today's visit      ASSESSMENT AND PLAN  • Pt was last seen with pharmacotherapy in July but he is feeling better after hospital admissions for SDH d/t GLF from syncope and pancreatitis  • He was unable to tolerate increased dose of metoprolol 50 mg daily  • Per Dr Duke, \"We did discuss the role of de-escalation of medical therapy but I would like him to be treated for least 6 to 12 months with the exception of amiodarone.\"    Resulting CHF medications today (changes are bolded)  • Entresto or ACE/ARB: Losartan 25 mg daily  • Beta blocker: Metoprolol SR 25 mg " daily  • Diuretic:Decrease to furosemide 20 mg daily PRN  • Aldosterone antagonist: Eplerenone 12.5 mg daily  • Stop Farxiga given recent hospitalization for pancreatitis    Lifestyle Recommendations From Today's Visit:   • Continue to limit sodium intake to 2gm/daily and fluid intake to 2L/daily    Blood Work Ordered At Today's visit:   None    Studies Ordered at Todays Visit:  None     Follow-Up:   1 months    Mirta Oliveira, JerryD

## 2021-12-21 ENCOUNTER — OFFICE VISIT (OUTPATIENT)
Dept: CARDIOLOGY | Facility: MEDICAL CENTER | Age: 51
End: 2021-12-21
Payer: COMMERCIAL

## 2021-12-21 VITALS
SYSTOLIC BLOOD PRESSURE: 128 MMHG | OXYGEN SATURATION: 100 % | HEIGHT: 72 IN | HEART RATE: 70 BPM | RESPIRATION RATE: 16 BRPM | DIASTOLIC BLOOD PRESSURE: 88 MMHG | WEIGHT: 246 LBS | BODY MASS INDEX: 33.32 KG/M2

## 2021-12-21 DIAGNOSIS — I48.0 PAROXYSMAL ATRIAL FIBRILLATION (HCC): ICD-10-CM

## 2021-12-21 DIAGNOSIS — I50.21 ACUTE SYSTOLIC HEART FAILURE (HCC): ICD-10-CM

## 2021-12-21 DIAGNOSIS — I10 ESSENTIAL HYPERTENSION, BENIGN: ICD-10-CM

## 2021-12-21 DIAGNOSIS — I47.20 VENTRICULAR TACHYARRHYTHMIA (HCC): ICD-10-CM

## 2021-12-21 DIAGNOSIS — I49.01 VENTRICULAR FIBRILLATION (HCC): ICD-10-CM

## 2021-12-21 DIAGNOSIS — I50.20 ACC/AHA STAGE C SYSTOLIC HEART FAILURE (HCC): ICD-10-CM

## 2021-12-21 DIAGNOSIS — Z79.899 HIGH RISK MEDICATION USE: ICD-10-CM

## 2021-12-21 DIAGNOSIS — E87.8 ELECTROLYTE IMBALANCE: ICD-10-CM

## 2021-12-21 DIAGNOSIS — Z95.810 ICD (IMPLANTABLE CARDIOVERTER-DEFIBRILLATOR), DUAL, IN SITU: ICD-10-CM

## 2021-12-21 DIAGNOSIS — F10.931 ALCOHOL WITHDRAWAL SYNDROME, WITH DELIRIUM (HCC): ICD-10-CM

## 2021-12-21 DIAGNOSIS — G47.33 OSA (OBSTRUCTIVE SLEEP APNEA): ICD-10-CM

## 2021-12-21 PROCEDURE — 99214 OFFICE O/P EST MOD 30 MIN: CPT | Performed by: INTERNAL MEDICINE

## 2021-12-21 RX ORDER — EPLERENONE 25 MG/1
12.5 TABLET, FILM COATED ORAL EVERY MORNING
Qty: 45 TABLET | Refills: 3 | Status: SHIPPED
Start: 2021-12-21 | End: 2021-12-21

## 2021-12-21 RX ORDER — LEVETIRACETAM 500 MG/1
500 TABLET ORAL DAILY
COMMUNITY
Start: 2021-12-13 | End: 2022-04-25

## 2021-12-21 ASSESSMENT — ENCOUNTER SYMPTOMS
EYES NEGATIVE: 1
CHILLS: 0
HEMOPTYSIS: 0
WEAKNESS: 0
DIZZINESS: 0
COUGH: 0
STRIDOR: 0
PALPITATIONS: 0
CLAUDICATION: 0
WHEEZING: 0
SPUTUM PRODUCTION: 0
NEUROLOGICAL NEGATIVE: 1
FEVER: 0
RESPIRATORY NEGATIVE: 1
ORTHOPNEA: 0
PND: 0
GASTROINTESTINAL NEGATIVE: 1
CARDIOVASCULAR NEGATIVE: 1
LOSS OF CONSCIOUSNESS: 0
BRUISES/BLEEDS EASILY: 0
SORE THROAT: 0
MUSCULOSKELETAL NEGATIVE: 1
CONSTITUTIONAL NEGATIVE: 1
SHORTNESS OF BREATH: 0

## 2021-12-21 ASSESSMENT — FIBROSIS 4 INDEX: FIB4 SCORE: 0.54

## 2021-12-21 NOTE — PROGRESS NOTES
Chief Complaint   Patient presents with   • CHF (Systolic)     F/V Dx: ACC/AHA stage C systolic heart failure (HCC)   • Abnormal EKG     F/V Dx: Prolonged Q-T interval on ECG   • Atrial Fibrillation       Subjective     Ras Blake is a 51 y.o. male who presents today as a new consultation for heart failure.  He is a 51-year-old male who had ventricular tachycardia arrest and subdural hematoma.  At the time he was drinking a pint of vodka or tequila per day.  His EF is normalized.    Since he was last seen he was seen by endocrinology due to an elevated TSH.  He was started on thyroid replacement.  Is scheduled for a thyroid ultrasound.  He has been on amiodarone.  It was started for the reason of VT. he has a defibrillator in place.  Has not gone off.  His EF is normalized.  He is also complaining of some breast tenderness and breast growth.    History reviewed. No pertinent past medical history.  History reviewed. No pertinent surgical history.  History reviewed. No pertinent family history.  Social History     Socioeconomic History   • Marital status:      Spouse name: Not on file   • Number of children: Not on file   • Years of education: Not on file   • Highest education level: Not on file   Occupational History   • Not on file   Tobacco Use   • Smoking status: Former Smoker     Types: Cigarettes   • Smokeless tobacco: Never Used   Vaping Use   • Vaping Use: Former   • Quit date: 5/14/2021   Substance and Sexual Activity   • Alcohol use: Not Currently   • Drug use: Yes     Types: Marijuana     Comment: RARE   • Sexual activity: Not on file   Other Topics Concern   • Not on file   Social History Narrative   • Not on file     Social Determinants of Health     Financial Resource Strain:    • Difficulty of Paying Living Expenses: Not on file   Food Insecurity:    • Worried About Running Out of Food in the Last Year: Not on file   • Ran Out of Food in the Last Year: Not on file   Transportation Needs:     • Lack of Transportation (Medical): Not on file   • Lack of Transportation (Non-Medical): Not on file   Physical Activity:    • Days of Exercise per Week: Not on file   • Minutes of Exercise per Session: Not on file   Stress:    • Feeling of Stress : Not on file   Social Connections:    • Frequency of Communication with Friends and Family: Not on file   • Frequency of Social Gatherings with Friends and Family: Not on file   • Attends Oriental orthodox Services: Not on file   • Active Member of Clubs or Organizations: Not on file   • Attends Club or Organization Meetings: Not on file   • Marital Status: Not on file   Intimate Partner Violence:    • Fear of Current or Ex-Partner: Not on file   • Emotionally Abused: Not on file   • Physically Abused: Not on file   • Sexually Abused: Not on file   Housing Stability:    • Unable to Pay for Housing in the Last Year: Not on file   • Number of Places Lived in the Last Year: Not on file   • Unstable Housing in the Last Year: Not on file     Allergies   Allergen Reactions   • Eplerenone      Gynecomastia   • Amiodarone      TSH elevated     Outpatient Encounter Medications as of 12/21/2021   Medication Sig Dispense Refill   • levETIRAcetam (KEPPRA) 500 MG Tab Take 500 mg by mouth every day.     • Acetaminophen (TYLENOL PO) Take  by mouth.     • furosemide (LASIX) 20 MG Tab Take 1 Tablet by mouth 1 time a day as needed (for swelling). 90 Tablet 3   • SYNTHROID 50 MCG Tab Take 1 Tablet by mouth every morning on an empty stomach. 30 Tablet 5   • metoprolol SR (TOPROL XL) 25 MG TABLET SR 24 HR Take 1 Tablet by mouth every day. 90 Tablet 3   • losartan (COZAAR) 25 MG Tab Take 1 Tablet by mouth every day. 90 Tablet 3   • Ascorbic Acid (VITAMIN C GUMMIE PO) Take 3-4 Tablets by mouth every day.     • multivitamin-iron-minerals-folic acid (CENTRUM) chewable tablet Chew 2 Tablets every day.     • [DISCONTINUED] eplerenone (INSPRA) 25 MG Tab Take 0.5 Tablets by mouth every morning. 45 Tablet  3   • [DISCONTINUED] sildenafil citrate (VIAGRA) 100 MG tablet Take 100 mg by mouth as needed.     • [DISCONTINUED] Dapagliflozin Propanediol 10 MG Tab Take 10 mg by mouth every day. 90 Tablet 3   • [DISCONTINUED] eplerenone (INSPRA) 25 MG Tab Take 0.5 Tablets by mouth every morning. 45 Tablet 3   • [DISCONTINUED] naltrexone (DEPADE) 50 MG Tab Take 50 mg by mouth every day.     • [DISCONTINUED] levetiracetam (KEPPRA) 1000 MG tablet Take 1,000 mg by mouth 2 times a day. (Patient not taking: Reported on 12/21/2021)     • [DISCONTINUED] amiodarone (CORDARONE) 200 MG Tab Take 1 tablet by mouth every day. 100 tablet 2     No facility-administered encounter medications on file as of 12/21/2021.     Review of Systems   Constitutional: Negative.  Negative for chills, fever and malaise/fatigue.   HENT: Negative.  Negative for sore throat.    Eyes: Negative.    Respiratory: Negative.  Negative for cough, hemoptysis, sputum production, shortness of breath, wheezing and stridor.    Cardiovascular: Negative.  Negative for chest pain, palpitations, orthopnea, claudication, leg swelling and PND.   Gastrointestinal: Negative.    Genitourinary: Negative.    Musculoskeletal: Negative.    Skin: Negative.    Neurological: Negative.  Negative for dizziness, loss of consciousness and weakness.   Endo/Heme/Allergies: Negative.  Does not bruise/bleed easily.        Breast pain   All other systems reviewed and are negative.             Objective     /88 (BP Location: Left arm, Patient Position: Sitting, BP Cuff Size: Adult)   Pulse 70   Resp 16   Ht 1.829 m (6')   Wt 112 kg (246 lb)   SpO2 100%   BMI 33.36 kg/m²     Physical Exam  Vitals and nursing note reviewed.   Constitutional:       General: He is not in acute distress.     Appearance: He is well-developed. He is not diaphoretic.   HENT:      Head: Normocephalic and atraumatic.      Right Ear: External ear normal.      Left Ear: External ear normal.      Nose: Nose normal.       Mouth/Throat:      Pharynx: No oropharyngeal exudate.   Eyes:      General: No scleral icterus.        Right eye: No discharge.         Left eye: No discharge.      Conjunctiva/sclera: Conjunctivae normal.      Pupils: Pupils are equal, round, and reactive to light.   Neck:      Vascular: No JVD.   Cardiovascular:      Rate and Rhythm: Normal rate and regular rhythm.      Heart sounds: No murmur heard.  No friction rub. No gallop.    Pulmonary:      Effort: Pulmonary effort is normal. No respiratory distress.      Breath sounds: No stridor. No wheezing or rales.   Chest:      Chest wall: No tenderness.   Abdominal:      General: There is no distension.      Palpations: Abdomen is soft.      Tenderness: There is no guarding.   Musculoskeletal:         General: No tenderness or deformity. Normal range of motion.      Cervical back: Neck supple.   Skin:     General: Skin is warm and dry.      Coloration: Skin is not pale.      Findings: No erythema or rash.   Neurological:      Mental Status: He is alert.      Cranial Nerves: No cranial nerve deficit.      Motor: No abnormal muscle tone.      Coordination: Coordination normal.      Deep Tendon Reflexes: Reflexes are normal and symmetric. Reflexes normal.   Psychiatric:         Behavior: Behavior normal.         Thought Content: Thought content normal.         Judgment: Judgment normal.            Echocardiogram: Dated 9/17/2021 personally viewed under myself showing normal LV systolic function no valvular heart disease.    Echocardiogram: Dated 5/14/2021 personally reviewed and interpreted by myself showing an EF of 35% with no valvular heart disease.    Cardiac catheterization: Dated 5/17/2021 personally viewed inter myself showing no ischemic coronary disease.    Assessment & Plan     1. High risk medication use  DISCONTINUED: eplerenone (INSPRA) 25 MG Tab   2. ICD (implantable cardioverter-defibrillator), dual, in situ Med[employer] Chrome placed 5/18/21     3.  Electrolyte imbalance     4. MANSOOR (obstructive sleep apnea)     5. Ventricular tachyarrhythmia (HCC)     6. Ventricular fibrillation (HCC)  DISCONTINUED: eplerenone (INSPRA) 25 MG Tab   7. Alcohol withdrawal syndrome, with delirium (HCC)     8. Paroxysmal atrial fibrillation (HCC)     9. Acute systolic heart failure (HCC)     10. ACC/AHA stage C systolic heart failure (HCC)  DISCONTINUED: eplerenone (INSPRA) 25 MG Tab   11. Essential hypertension, benign  DISCONTINUED: eplerenone (INSPRA) 25 MG Tab       Medical Decision Making: Today's Assessment/Status/Plan:        51-year-old male with heart failure with reduced ejection fraction alcohol abuse now alcohol free with a now normalized heart function.  I am not thinking his elevated TSH is secondary to amiodarone therapy.  We will stop that entirely.  We will also stop his eplerenone due to gynecomastia and breast pain.  He will watch his edema.  His EF is normalized and is got an ICD in place therefore we discussed the risk benefits of being him to stop the amiodarone and he agrees.  We will see him back in 6 months.

## 2022-01-27 ENCOUNTER — NON-PROVIDER VISIT (OUTPATIENT)
Dept: CARDIOLOGY | Facility: MEDICAL CENTER | Age: 52
End: 2022-01-27
Payer: COMMERCIAL

## 2022-01-27 VITALS
DIASTOLIC BLOOD PRESSURE: 83 MMHG | WEIGHT: 260 LBS | HEART RATE: 83 BPM | SYSTOLIC BLOOD PRESSURE: 144 MMHG | BODY MASS INDEX: 35.26 KG/M2

## 2022-01-27 DIAGNOSIS — I50.20 ACC/AHA STAGE C SYSTOLIC HEART FAILURE (HCC): ICD-10-CM

## 2022-01-27 PROCEDURE — 99211 OFF/OP EST MAY X REQ PHY/QHP: CPT | Performed by: INTERNAL MEDICINE

## 2022-01-27 ASSESSMENT — FIBROSIS 4 INDEX: FIB4 SCORE: 0.54

## 2022-01-28 NOTE — PROGRESS NOTES
CHF Pharmacotherapy visit - Visit    Date of Service: 12/09/21    Time In: 1630  Time Out: 1700    Ross Blake is here for CHF     HPI  Pertinent Interval History since last visit:   • Pt states he has been doing well and tolerating medication regimen. However, he has noticed some weight gain and some swelling in his legs.   • Pt has history of syncope due to hypotension and sustained a SDH. See previous note on 12/9.    Most recent EF:  65% 9/2021 improved from 35% on 5/14/21    Current Outpatient Medications:   •  levETIRAcetam, 500 mg, Oral, DAILY  •  Acetaminophen (TYLENOL PO), Take  by mouth.  •  furosemide, 20 mg, Oral, QDAY PRN  •  Synthroid, 50 mcg, Oral, AM ES  •  metoprolol SR, 25 mg, Oral, DAILY  •  losartan, 25 mg, Oral, DAILY  •  Ascorbic Acid (VITAMIN C GUMMIE PO), 3-4 Tablet, Oral, DAILY  •  multivitamin-iron-minerals-folic acid, 2 Tablet, Oral, DAILY    Current Adherence to CHF Therapies:  Complete    Current CHF Medications - including dose:   • Entresto or ACE/ARB: losartan 25 mg daily  • Beta blocker: metoprolol SR 25 mg daily - unable to tolerate 50 mg daily  • Diuretic: furosemide 20 mg daily PRN    Vitals:    01/27/22 1638   BP: 144/83   Pulse: 83     Home BP: Usually around 127/80s, sometimes lower. Pt reported highest of 145/90, pulse 70-72    Change in weight: Increasing but pt has not been weighing himself consistently     Exercise habits: moderate regular exercise program     Diet: mostly chicken and rice, limiting sodium and processed foods      SOCIAL HISTORY  Social History     Tobacco Use   Smoking Status Former Smoker   • Types: Cigarettes   Smokeless Tobacco Never Used        DATA REVIEW  No results found for: HBA1C       No results found for: CHOLSTRLTOT, LDL, HDL, TRIGLYCERIDE    Lab Results   Component Value Date/Time    SODIUM 140 12/02/2021 09:13 AM    POTASSIUM 4.4 12/02/2021 09:13 AM    CHLORIDE 104 12/02/2021 09:13 AM    CO2 26 12/02/2021 09:13 AM    GLUCOSE 98  "12/02/2021 09:13 AM    BUN 24 (H) 12/02/2021 09:13 AM    CREATININE 1.03 12/02/2021 09:13 AM     Lab Results   Component Value Date/Time    ALKPHOSPHAT 76 12/02/2021 09:13 AM    ASTSGOT 11 (L) 12/02/2021 09:13 AM    ALTSGPT 12 12/02/2021 09:13 AM    TBILIRUBIN 0.2 12/02/2021 09:13 AM    INR 1.27 (H) 05/17/2021 03:20 AM    ALBUMIN 4.1 12/02/2021 09:13 AM      No components found for: MICROALBUMINCREATRATIOURINE    Renal function:  Calculated creatinine clearance: >100 ml/min     Other Pertinent Blood Work:     Other:  Immunization History   Administered Date(s) Administered   • Influenza (IM) Preservative Free - HISTORICAL DATA 10/18/2016, 12/07/2017   • Influenza Seasonal Injectable - Historical Data 09/11/2014   • Influenza Vaccine Quad Inj (Pf) 10/10/2018   • Influenza, Unspecified - HISTORICAL DATA 11/11/2019   • Pfizer SARS-CoV-2 Vaccine 12+ 04/23/2021, 06/17/2021     Up to date on pneumococcal vaccine? Discuss at next visit    Recent Imaging Studies:    Recent imaging studies, including ECHO, were available in EMR and reviewed with patient at today's visit      ASSESSMENT AND PLAN  • Per visit with Dr. Duke, pt has stopped amiodarone and eplerenone. \"He will watch his edema.  His EF is normalized and is got an ICD in place therefore we discussed the risk benefits of being him to stop the amiodarone and he agrees.\"  • Pt has been using furosemide 20 mg daily  • Pt has gained 15 pounds since last visit and complains of lower extremity swelling. Pt denies any dizziness or s/s of hypotension.     Resulting CHF medications today (changes are bolded)  • Entresto or ACE/ARB: Losartan 25 mg daily  • Beta blocker: Metoprolol SR 25 mg daily  • Diuretic: Increase furosemide to 40 mg for next 3 days, then resume taking 20 mg once daily as needed    Lifestyle Recommendations From Today's Visit:   • Continue to limit sodium intake to 2gm/daily and fluid intake to 2L/daily    Blood Work Ordered At Today's visit: "   None    Studies Ordered at Todays Visit:  None     Follow-Up:   4 days via phone call to assess fluid status  4 weeks in clinic    Prudence Novoa, Pharmacy Intern  Mirta Jorge, PharmD

## 2022-01-31 ENCOUNTER — TELEPHONE (OUTPATIENT)
Dept: CARDIOLOGY | Facility: MEDICAL CENTER | Age: 52
End: 2022-01-31

## 2022-01-31 RX ORDER — FUROSEMIDE 20 MG/1
40 TABLET ORAL
Qty: 90 TABLET | Refills: 3 | Status: SHIPPED | OUTPATIENT
Start: 2022-01-31 | End: 2022-04-04 | Stop reason: SDUPTHER

## 2022-01-31 NOTE — TELEPHONE ENCOUNTER
[facility] called to get clarification on the script for furosemide (LASIX) 20 MG Tab. They want to know if it's a mistake that it went from 1 tablet a daily to 2 a day now. Can you please call them back at [phone].      Thank you,    JAVIER

## 2022-01-31 NOTE — PROGRESS NOTES
Called pt to f/u on his LE swelling w/ furosemide dose increase.     Pt states his swelling has improved somewhat, but his weight is only down ~ 1 lb. Pt states his weight increase may be dietary related.    He states that while his swelling has improved, it is still more than his baseline - given this advised pt to continue taking furosemide 40 mg daily until his legs are back to baseline and to then resume 20 mg once daily.    Shree Dumont, PharmD, BCACP

## 2022-02-14 DIAGNOSIS — E03.8 SUBCLINICAL HYPOTHYROIDISM: ICD-10-CM

## 2022-02-14 RX ORDER — LEVOTHYROXINE SODIUM 50 MCG
50 TABLET ORAL
Qty: 30 TABLET | Refills: 5 | Status: SHIPPED | OUTPATIENT
Start: 2022-02-14 | End: 2022-03-11 | Stop reason: SDUPTHER

## 2022-02-14 NOTE — TELEPHONE ENCOUNTER
Received request via: Pharmacy    Was the patient seen in the last year in this department? Yes    Does the patient have an active prescription (recently filled or refills available) for medication(s) requested? No     REQUESTED MEDICATION:   Requested Prescriptions     Pending Prescriptions Disp Refills   • SYNTHROID 50 MCG Tab 30 Tablet 5     Sig: Take 1 Tablet by mouth every morning on an empty stomach.

## 2022-02-24 ENCOUNTER — NON-PROVIDER VISIT (OUTPATIENT)
Dept: CARDIOLOGY | Facility: MEDICAL CENTER | Age: 52
End: 2022-02-24
Payer: COMMERCIAL

## 2022-02-24 VITALS
WEIGHT: 267 LBS | BODY MASS INDEX: 36.21 KG/M2 | HEART RATE: 85 BPM | SYSTOLIC BLOOD PRESSURE: 116 MMHG | DIASTOLIC BLOOD PRESSURE: 72 MMHG

## 2022-02-24 PROCEDURE — 99211 OFF/OP EST MAY X REQ PHY/QHP: CPT | Performed by: INTERNAL MEDICINE

## 2022-02-24 ASSESSMENT — FIBROSIS 4 INDEX: FIB4 SCORE: 0.54

## 2022-02-25 NOTE — PROGRESS NOTES
CHF Pharmacotherapy visit - Visit    Date of Service: 2/24/22    Time In: 1630  Time Out: 1700    Ross Blake is here for CHF     HPI  Pertinent Interval History since last visit:   • Pt states he has been doing well and tolerating medication regimen.   • Pt has history of syncope due to hypotension and sustained a SDH. See previous note on 12/9.  • Pt has gain 7 lbs; his bilateral LE edema is stable; pt denies any SOB    Most recent EF:  65% 9/2021 improved from 35% on 5/14/21    Current Outpatient Medications:   •  Synthroid, 50 mcg, Oral, AM ES  •  furosemide, 40 mg, Oral, QDAY PRN  •  levETIRAcetam, 500 mg, Oral, DAILY  •  Acetaminophen (TYLENOL PO), Take  by mouth.  •  metoprolol SR, 25 mg, Oral, DAILY  •  losartan, 25 mg, Oral, DAILY  •  Ascorbic Acid (VITAMIN C GUMMIE PO), 3-4 Tablet, Oral, DAILY  •  multivitamin-iron-minerals-folic acid, 2 Tablet, Oral, DAILY    Current Adherence to CHF Therapies:  Complete    Current CHF Medications - including dose:   • Entresto or ACE/ARB: losartan 25 mg daily  • Beta blocker: metoprolol SR 25 mg daily - unable to tolerate 50 mg daily d/t syncope  • Diuretic: furosemide 40 mg daily  • AA: Eplerenone d/c'd by Dr Duke 12/21  • SGLT2i: Farxiga d/c'd by Dr Duke 12/21    Vitals:    02/24/22 1640   BP: 116/72   Pulse: 85       Home BP: Usually around 120/80    Change in weight: Increasing but pt has not been weighing himself consistently     Exercise habits: moderate regular exercise program     Diet: mostly chicken and rice, limiting sodium and processed foods; pt lives a relatively health lifestyle    SOCIAL HISTORY  Social History     Tobacco Use   Smoking Status Former Smoker   • Types: Cigarettes   Smokeless Tobacco Never Used        DATA REVIEW  No results found for: HBA1C       No results found for: CHOLSTRLTOT, LDL, HDL, TRIGLYCERIDE    Lab Results   Component Value Date/Time    SODIUM 140 12/02/2021 09:13 AM    POTASSIUM 4.4 12/02/2021 09:13 AM    CHLORIDE 104  12/02/2021 09:13 AM    CO2 26 12/02/2021 09:13 AM    GLUCOSE 98 12/02/2021 09:13 AM    BUN 24 (H) 12/02/2021 09:13 AM    CREATININE 1.03 12/02/2021 09:13 AM     Lab Results   Component Value Date/Time    ALKPHOSPHAT 76 12/02/2021 09:13 AM    ASTSGOT 11 (L) 12/02/2021 09:13 AM    ALTSGPT 12 12/02/2021 09:13 AM    TBILIRUBIN 0.2 12/02/2021 09:13 AM    INR 1.27 (H) 05/17/2021 03:20 AM    ALBUMIN 4.1 12/02/2021 09:13 AM      No components found for: MICROALBUMINCREATRATIOURINE    Renal function:  Calculated creatinine clearance: >100 ml/min     Other Pertinent Blood Work:     Other:  Immunization History   Administered Date(s) Administered   • Influenza (IM) Preservative Free - HISTORICAL DATA 10/18/2016, 12/07/2017   • Influenza Seasonal Injectable - Historical Data 09/11/2014   • Influenza Vaccine Quad Inj (Pf) 10/10/2018   • Influenza, Unspecified - HISTORICAL DATA 11/11/2019   • Pfizer SARS-CoV-2 Vaccine 12+ 04/23/2021, 06/17/2021     Up to date on pneumococcal vaccine? Discuss at next visit    Recent Imaging Studies:    Recent imaging studies, including ECHO, were available in EMR and reviewed with patient at today's visit      ASSESSMENT AND PLAN  • Pt has been continuing with furosemide 40 mg daily  • Pt denies any dizziness or s/s of hypotension  • Pt's bilateral LE swelling is stable, pt also denies SOB  • Suspect pt's weight gain stems from hypothyroidism - pt to f/u with endocrinology    Resulting CHF medications today (changes are bolded)  • Entresto or ACE/ARB: Losartan 25 mg daily  • Beta blocker: Metoprolol SR 25 mg daily  • Diuretic: Furosemide 40 mg daily    Lifestyle Recommendations From Today's Visit:   • Continue to limit sodium intake to 2gm/daily and fluid intake to 2L/daily    Blood Work Ordered At Today's visit:   None    Studies Ordered at Todays Visit:  None     Follow-Up:   12 weeks    Mirta Jorge, JerryD

## 2022-03-11 DIAGNOSIS — E03.8 SUBCLINICAL HYPOTHYROIDISM: ICD-10-CM

## 2022-03-14 RX ORDER — LEVOTHYROXINE SODIUM 50 MCG
50 TABLET ORAL
Qty: 30 TABLET | Refills: 5 | Status: SHIPPED | OUTPATIENT
Start: 2022-03-14 | End: 2022-05-16 | Stop reason: SDUPTHER

## 2022-03-25 ENCOUNTER — PATIENT MESSAGE (OUTPATIENT)
Dept: CARDIOLOGY | Facility: MEDICAL CENTER | Age: 52
End: 2022-03-25
Payer: COMMERCIAL

## 2022-03-28 ENCOUNTER — PROCEDURE VISIT (OUTPATIENT)
Dept: ENDOCRINOLOGY | Facility: MEDICAL CENTER | Age: 52
End: 2022-03-28
Attending: INTERNAL MEDICINE
Payer: COMMERCIAL

## 2022-03-28 ENCOUNTER — HOSPITAL ENCOUNTER (OUTPATIENT)
Dept: LAB | Facility: MEDICAL CENTER | Age: 52
End: 2022-03-28
Attending: INTERNAL MEDICINE
Payer: COMMERCIAL

## 2022-03-28 DIAGNOSIS — E03.8 SUBCLINICAL HYPOTHYROIDISM: ICD-10-CM

## 2022-03-28 DIAGNOSIS — T46.2X5A ADVERSE EFFECT OF AMIODARONE, INITIAL ENCOUNTER: ICD-10-CM

## 2022-03-28 DIAGNOSIS — E29.1 HYPOGONADISM IN MALE: ICD-10-CM

## 2022-03-28 DIAGNOSIS — E04.1 LEFT THYROID NODULE: ICD-10-CM

## 2022-03-28 LAB
ALBUMIN SERPL BCP-MCNC: 4.4 G/DL (ref 3.2–4.9)
ALBUMIN/GLOB SERPL: 2.1 G/DL
ALP SERPL-CCNC: 111 U/L (ref 30–99)
ALT SERPL-CCNC: 21 U/L (ref 2–50)
ANION GAP SERPL CALC-SCNC: 12 MMOL/L (ref 7–16)
AST SERPL-CCNC: 25 U/L (ref 12–45)
BILIRUB SERPL-MCNC: 0.4 MG/DL (ref 0.1–1.5)
BUN SERPL-MCNC: 13 MG/DL (ref 8–22)
CALCIUM SERPL-MCNC: 8.7 MG/DL (ref 8.5–10.5)
CHLORIDE SERPL-SCNC: 102 MMOL/L (ref 96–112)
CO2 SERPL-SCNC: 25 MMOL/L (ref 20–33)
CREAT SERPL-MCNC: 0.82 MG/DL (ref 0.5–1.4)
GFR SERPLBLD CREATININE-BSD FMLA CKD-EPI: 107 ML/MIN/1.73 M 2
GLOBULIN SER CALC-MCNC: 2.1 G/DL (ref 1.9–3.5)
GLUCOSE SERPL-MCNC: 95 MG/DL (ref 65–99)
POTASSIUM SERPL-SCNC: 4.3 MMOL/L (ref 3.6–5.5)
PROT SERPL-MCNC: 6.5 G/DL (ref 6–8.2)
SODIUM SERPL-SCNC: 139 MMOL/L (ref 135–145)
T4 FREE SERPL-MCNC: 1.28 NG/DL (ref 0.93–1.7)
TSH SERPL DL<=0.005 MIU/L-ACNC: 0.33 UIU/ML (ref 0.38–5.33)

## 2022-03-28 PROCEDURE — 36415 COLL VENOUS BLD VENIPUNCTURE: CPT

## 2022-03-28 PROCEDURE — 84439 ASSAY OF FREE THYROXINE: CPT

## 2022-03-28 PROCEDURE — 76536 US EXAM OF HEAD AND NECK: CPT | Performed by: INTERNAL MEDICINE

## 2022-03-28 PROCEDURE — 80053 COMPREHEN METABOLIC PANEL: CPT

## 2022-03-28 PROCEDURE — 84443 ASSAY THYROID STIM HORMONE: CPT

## 2022-03-28 NOTE — PATIENT COMMUNICATION
LVM for pt to call back. Need more detailed info about what patient is actually consuming ie. Pre cooked, boxed, packaged, etc.     Also, not drinking enough water, body won't release what it doesn't feel it has.     To Pharm as FYI since F/U 3/29/22

## 2022-03-29 ENCOUNTER — APPOINTMENT (OUTPATIENT)
Dept: CARDIOLOGY | Facility: MEDICAL CENTER | Age: 52
End: 2022-03-29
Payer: COMMERCIAL

## 2022-04-04 ENCOUNTER — NON-PROVIDER VISIT (OUTPATIENT)
Dept: CARDIOLOGY | Facility: MEDICAL CENTER | Age: 52
End: 2022-04-04
Payer: COMMERCIAL

## 2022-04-04 VITALS
SYSTOLIC BLOOD PRESSURE: 125 MMHG | DIASTOLIC BLOOD PRESSURE: 84 MMHG | WEIGHT: 267 LBS | HEART RATE: 76 BPM | BODY MASS INDEX: 36.21 KG/M2

## 2022-04-04 DIAGNOSIS — I50.20 ACC/AHA STAGE C SYSTOLIC HEART FAILURE (HCC): ICD-10-CM

## 2022-04-04 PROCEDURE — 99211 OFF/OP EST MAY X REQ PHY/QHP: CPT | Performed by: NURSE PRACTITIONER

## 2022-04-04 RX ORDER — FUROSEMIDE 40 MG/1
40 TABLET ORAL 2 TIMES DAILY PRN
Qty: 60 TABLET | Refills: 2 | Status: SHIPPED | OUTPATIENT
Start: 2022-04-04 | End: 2022-04-25 | Stop reason: SDUPTHER

## 2022-04-04 ASSESSMENT — FIBROSIS 4 INDEX: FIB4 SCORE: 0.92

## 2022-04-04 NOTE — PROGRESS NOTES
CHF Pharmacotherapy visit - Visit    Date of Service: 4/4/22    Time In: 0930  Time Out: 1000    Ross Blake is here for CHF     HPI  Pertinent Interval History since last visit:   • Pt states he has been doing well and tolerating medication regimen.   • Pt has history of syncope due to hypotension and sustained a SDH. See previous note on 12/9.  • Pt has been experiencing LE edema that has not resolved; pt's weight stable from last visit but noticed weight gain prior to last visit  • Pt's hypothyroidism is stable    Most recent EF:  65% 9/2021 improved from 35% on 5/14/21    Current Outpatient Medications:   •  furosemide, 40 mg, Oral, BID PRN  •  Synthroid, 50 mcg, Oral, AM ES  •  levETIRAcetam, 500 mg, Oral, DAILY  •  Acetaminophen (TYLENOL PO), Take  by mouth.  •  metoprolol SR, 25 mg, Oral, DAILY  •  losartan, 25 mg, Oral, DAILY  •  Ascorbic Acid (VITAMIN C GUMMIE PO), 3-4 Tablet, Oral, DAILY  •  multivitamin-iron-minerals-folic acid, 2 Tablet, Oral, DAILY    Current Adherence to CHF Therapies:  Complete    Current CHF Medications - including dose:   • Entresto or ACE/ARB: losartan 25 mg daily  • Beta blocker: metoprolol SR 25 mg daily - unable to tolerate 50 mg daily d/t syncope  • Diuretic: furosemide 20 mg BID  • AA: Eplerenone d/c'd by Dr Duke 12/21  • SGLT2i: Farxiga d/c'd by Dr Duke 12/21    Vitals:    04/04/22 0955   BP: 125/84   Pulse: 76       Home BP: Usually around 120/80    Change in weight: Stable    Exercise habits: moderate regular exercise program     Diet: mostly chicken and rice, limiting sodium and processed foods; pt lives a relatively health lifestyle    SOCIAL HISTORY  Social History     Tobacco Use   Smoking Status Former Smoker   • Types: Cigarettes   Smokeless Tobacco Never Used        DATA REVIEW  No results found for: HBA1C       No results found for: CHOLSTRLTOT, LDL, HDL, TRIGLYCERIDE    Lab Results   Component Value Date/Time    SODIUM 139 03/28/2022 01:32 PM    POTASSIUM  4.3 03/28/2022 01:32 PM    CHLORIDE 102 03/28/2022 01:32 PM    CO2 25 03/28/2022 01:32 PM    GLUCOSE 95 03/28/2022 01:32 PM    BUN 13 03/28/2022 01:32 PM    CREATININE 0.82 03/28/2022 01:32 PM     Lab Results   Component Value Date/Time    ALKPHOSPHAT 111 (H) 03/28/2022 01:32 PM    ASTSGOT 25 03/28/2022 01:32 PM    ALTSGPT 21 03/28/2022 01:32 PM    TBILIRUBIN 0.4 03/28/2022 01:32 PM    INR 1.27 (H) 05/17/2021 03:20 AM    ALBUMIN 4.4 03/28/2022 01:32 PM      No components found for: MICROALBUMINCREATRATIOURINE    Renal function:  Calculated creatinine clearance: >100 ml/min     Other Pertinent Blood Work:     Other:  Immunization History   Administered Date(s) Administered   • Influenza (IM) Preservative Free - HISTORICAL DATA 10/18/2016, 12/07/2017   • Influenza Seasonal Injectable - Historical Data 09/11/2014   • Influenza Vaccine Quad Inj (Pf) 10/10/2018   • Influenza, Unspecified - HISTORICAL DATA 11/11/2019   • Pfizer SARS-CoV-2 Vaccine 12+ 04/23/2021, 06/17/2021     Up to date on pneumococcal vaccine? Discuss at next visit    Recent Imaging Studies:    Recent imaging studies, including ECHO, were available in EMR and reviewed with patient at today's visit      ASSESSMENT AND PLAN  • Pt has been continuing with furosemide 20mg BID  • Pt denies any dizziness or s/s of hypotension  • Pt denies SOB; bilateral LE has not resolved    Resulting CHF medications today (changes are bolded)  • Entresto or ACE/ARB: Losartan 25 mg daily  • Beta blocker: Metoprolol SR 25 mg daily  • Diuretic: Increase Furosemide to 40 mg BID PRN    Lifestyle Recommendations From Today's Visit:   • Continue to limit sodium intake to 2gm/daily and fluid intake to 2L/daily    Blood Work Ordered At Today's visit:   None    Studies Ordered at Todays Visit:  None     Follow-Up:   3 weeks with Dr Leilani STOKES with pharmacy    Mirta Jorge, JerryD

## 2022-04-25 ENCOUNTER — OFFICE VISIT (OUTPATIENT)
Dept: CARDIOLOGY | Facility: MEDICAL CENTER | Age: 52
End: 2022-04-25
Payer: COMMERCIAL

## 2022-04-25 VITALS
HEIGHT: 72 IN | OXYGEN SATURATION: 100 % | DIASTOLIC BLOOD PRESSURE: 80 MMHG | WEIGHT: 273 LBS | SYSTOLIC BLOOD PRESSURE: 130 MMHG | RESPIRATION RATE: 16 BRPM | BODY MASS INDEX: 36.98 KG/M2 | HEART RATE: 86 BPM

## 2022-04-25 DIAGNOSIS — I10 ESSENTIAL HYPERTENSION, BENIGN: ICD-10-CM

## 2022-04-25 DIAGNOSIS — Z79.899 HIGH RISK MEDICATION USE: ICD-10-CM

## 2022-04-25 DIAGNOSIS — G47.33 OSA (OBSTRUCTIVE SLEEP APNEA): ICD-10-CM

## 2022-04-25 DIAGNOSIS — I49.01 VENTRICULAR FIBRILLATION (HCC): ICD-10-CM

## 2022-04-25 DIAGNOSIS — I48.0 PAROXYSMAL ATRIAL FIBRILLATION (HCC): ICD-10-CM

## 2022-04-25 DIAGNOSIS — I50.20 ACC/AHA STAGE C SYSTOLIC HEART FAILURE (HCC): ICD-10-CM

## 2022-04-25 DIAGNOSIS — F10.931 ALCOHOL WITHDRAWAL SYNDROME, WITH DELIRIUM (HCC): ICD-10-CM

## 2022-04-25 DIAGNOSIS — I47.20 VENTRICULAR TACHYARRHYTHMIA (HCC): ICD-10-CM

## 2022-04-25 DIAGNOSIS — R94.31 PROLONGED Q-T INTERVAL ON ECG: ICD-10-CM

## 2022-04-25 DIAGNOSIS — I50.21 ACUTE SYSTOLIC HEART FAILURE (HCC): ICD-10-CM

## 2022-04-25 DIAGNOSIS — Z95.810 ICD (IMPLANTABLE CARDIOVERTER-DEFIBRILLATOR), DUAL, IN SITU: ICD-10-CM

## 2022-04-25 PROCEDURE — 99214 OFFICE O/P EST MOD 30 MIN: CPT | Performed by: INTERNAL MEDICINE

## 2022-04-25 RX ORDER — LOSARTAN POTASSIUM 25 MG/1
25 TABLET ORAL DAILY
Qty: 90 TABLET | Refills: 3 | Status: SHIPPED
Start: 2022-04-25 | End: 2023-02-23

## 2022-04-25 RX ORDER — FUROSEMIDE 40 MG/1
40 TABLET ORAL 2 TIMES DAILY PRN
Qty: 60 TABLET | Refills: 11 | Status: SHIPPED | OUTPATIENT
Start: 2022-04-25

## 2022-04-25 ASSESSMENT — MINNESOTA LIVING WITH HEART FAILURE QUESTIONNAIRE (MLHF)
WORKING AROUND THE HOUSE OR YARD DIFFICULT: 0
DIFFICULTY TO CONCENTRATE OR REMEMBERING THINGS: 0
TOTAL_SCORE: 6
DIFFICULTY SLEEPING WELL AT NIGHT: 0
DIFFICULTY WORKING TO EARN A LIVING: 0
COSTING YOU MONEY FOR MEDICAL CARE: 0
MAKING YOU FEEL DEPRESSED: 0
DIFFICULTY WITH RECREATIONAL PASTIMES, SPORTS, HOBBIES: 1
LOSS OF SELF CONTROL IN YOUR LIFE: 0
SWELLING IN ANKLES OR LEGS: 3
GIVING YOU SIDE EFFECTS FROM TREATMENTS: 0
DIFFICULTY SOCIALIZING WITH FAMILY OR FRIENDS: 0
MAKING YOU STAY IN A HOSPITAL: 0
TIRED, FATIGUED OR LOW ON ENERGY: 0
DIFFICULTY WITH SEXUAL ACTIVITIES: 1
FEELING LIKE A BURDEN TO FAMILY AND FRIENDS: 0
DIFFICULTY GOING AWAY FROM HOME: 0
HAVING TO SIT OR LIE DOWN DURING THE DAY: 0
MAKING YOU WORRY: 0
EATING LESS FOODS YOU LIKE: 1
WALKING ABOUT OR CLIMBING STAIRS DIFFICULT: 0
MAKING YOU SHORT OF BREATH: 0

## 2022-04-25 ASSESSMENT — ENCOUNTER SYMPTOMS
NEUROLOGICAL NEGATIVE: 1
GASTROINTESTINAL NEGATIVE: 1
SPUTUM PRODUCTION: 0
LOSS OF CONSCIOUSNESS: 0
COUGH: 0
PALPITATIONS: 0
EYES NEGATIVE: 1
RESPIRATORY NEGATIVE: 1
SHORTNESS OF BREATH: 0
STRIDOR: 0
ORTHOPNEA: 0
WHEEZING: 0
PND: 0
CARDIOVASCULAR NEGATIVE: 1
MUSCULOSKELETAL NEGATIVE: 1
BRUISES/BLEEDS EASILY: 0
CHILLS: 0
HEMOPTYSIS: 0
SORE THROAT: 0
CLAUDICATION: 0
FEVER: 0
DIZZINESS: 0
CONSTITUTIONAL NEGATIVE: 1
WEAKNESS: 0

## 2022-04-25 ASSESSMENT — FIBROSIS 4 INDEX: FIB4 SCORE: 0.92

## 2022-04-25 NOTE — PROGRESS NOTES
Chief Complaint   Patient presents with   • Congestive Heart Failure     F/V DX: Acute systolic heart failure (HCC)   • Atrial Fibrillation       Subjective     Ras Blake is a 51 y.o. male who presents today as a new consultation for heart failure.  He is a 51-year-old male who had ventricular tachycardia arrest and subdural hematoma.  At the time he was drinking a pint of vodka or tequila per day.  His EF is normalized.      Since he was last seen he has been doing well with exception of some weight gain.  He started taking furosemide and he is now euvolemic.  He is concerned that his weight gain is likely water weight.  He has been trying to lose weight.  His EF is normalized.  He is otherwise been doing well.    No past medical history on file.  No past surgical history on file.  No family history on file.  Social History     Socioeconomic History   • Marital status:      Spouse name: Not on file   • Number of children: Not on file   • Years of education: Not on file   • Highest education level: Not on file   Occupational History   • Not on file   Tobacco Use   • Smoking status: Former Smoker     Types: Cigarettes   • Smokeless tobacco: Never Used   Vaping Use   • Vaping Use: Former   • Quit date: 5/14/2021   Substance and Sexual Activity   • Alcohol use: Not Currently   • Drug use: Yes     Types: Marijuana     Comment: RARE   • Sexual activity: Not on file   Other Topics Concern   • Not on file   Social History Narrative   • Not on file     Social Determinants of Health     Financial Resource Strain: Not on file   Food Insecurity: Not on file   Transportation Needs: Not on file   Physical Activity: Not on file   Stress: Not on file   Social Connections: Not on file   Intimate Partner Violence: Not on file   Housing Stability: Not on file     Allergies   Allergen Reactions   • Eplerenone      Gynecomastia   • Amiodarone      TSH elevated     Outpatient Encounter Medications as of 4/25/2022    Medication Sig Dispense Refill   • losartan (COZAAR) 25 MG Tab Take 1 Tablet by mouth every day. 90 Tablet 3   • furosemide (LASIX) 40 MG Tab Take 1 Tablet by mouth 2 times a day as needed (for swelling). 60 Tablet 11   • SYNTHROID 50 MCG Tab Take 1 Tablet by mouth every morning on an empty stomach. 30 Tablet 5   • metoprolol SR (TOPROL XL) 25 MG TABLET SR 24 HR Take 1 Tablet by mouth every day. 90 Tablet 3   • Ascorbic Acid (VITAMIN C GUMMIE PO) Take 3-4 Tablets by mouth every day.     • multivitamin-iron-minerals-folic acid (CENTRUM) chewable tablet Chew 2 Tablets every day.     • [DISCONTINUED] furosemide (LASIX) 40 MG Tab Take 1 Tablet by mouth 2 times a day as needed (for swelling). 60 Tablet 2   • [DISCONTINUED] levETIRAcetam (KEPPRA) 500 MG Tab Take 500 mg by mouth every day.     • [DISCONTINUED] Acetaminophen (TYLENOL PO) Take  by mouth.     • [DISCONTINUED] losartan (COZAAR) 25 MG Tab Take 1 Tablet by mouth every day. 90 Tablet 3     No facility-administered encounter medications on file as of 4/25/2022.     Review of Systems   Constitutional: Negative.  Negative for chills, fever and malaise/fatigue.   HENT: Negative.  Negative for sore throat.    Eyes: Negative.    Respiratory: Negative.  Negative for cough, hemoptysis, sputum production, shortness of breath, wheezing and stridor.    Cardiovascular: Negative.  Negative for chest pain, palpitations, orthopnea, claudication, leg swelling and PND.   Gastrointestinal: Negative.    Genitourinary: Negative.    Musculoskeletal: Negative.    Skin: Negative.    Neurological: Negative.  Negative for dizziness, loss of consciousness and weakness.   Endo/Heme/Allergies: Negative.  Does not bruise/bleed easily.        Breast pain   All other systems reviewed and are negative.             Objective     /80 (BP Location: Left arm, Patient Position: Sitting, BP Cuff Size: Adult)   Pulse 86   Resp 16   Ht 1.829 m (6')   Wt 124 kg (273 lb)   SpO2 100%   BMI  37.03 kg/m²     Physical Exam  Vitals and nursing note reviewed.   Constitutional:       General: He is not in acute distress.     Appearance: He is well-developed. He is not diaphoretic.   HENT:      Head: Normocephalic and atraumatic.      Right Ear: External ear normal.      Left Ear: External ear normal.      Nose: Nose normal.      Mouth/Throat:      Pharynx: No oropharyngeal exudate.   Eyes:      General: No scleral icterus.        Right eye: No discharge.         Left eye: No discharge.      Conjunctiva/sclera: Conjunctivae normal.      Pupils: Pupils are equal, round, and reactive to light.   Neck:      Vascular: No JVD.   Cardiovascular:      Rate and Rhythm: Normal rate and regular rhythm.      Heart sounds: No murmur heard.    No friction rub. No gallop.   Pulmonary:      Effort: Pulmonary effort is normal. No respiratory distress.      Breath sounds: No stridor. No wheezing or rales.   Chest:      Chest wall: No tenderness.   Abdominal:      General: There is no distension.      Palpations: Abdomen is soft.      Tenderness: There is no guarding.   Musculoskeletal:         General: No tenderness or deformity. Normal range of motion.      Cervical back: Neck supple.   Skin:     General: Skin is warm and dry.      Coloration: Skin is not pale.      Findings: No erythema or rash.   Neurological:      Mental Status: He is alert.      Cranial Nerves: No cranial nerve deficit.      Motor: No abnormal muscle tone.      Coordination: Coordination normal.      Deep Tendon Reflexes: Reflexes are normal and symmetric. Reflexes normal.   Psychiatric:         Behavior: Behavior normal.         Thought Content: Thought content normal.         Judgment: Judgment normal.            Echocardiogram: Dated 9/17/2021 personally viewed under myself showing normal LV systolic function no valvular heart disease.    Echocardiogram: Dated 5/14/2021 personally reviewed and interpreted by myself showing an EF of 35% with no  valvular heart disease.    Cardiac catheterization: Dated 5/17/2021 personally viewed inter myself showing no ischemic coronary disease.    Assessment & Plan     1. Acute systolic heart failure (HCC)  losartan (COZAAR) 25 MG Tab    Basic Metabolic Panel   2. Paroxysmal atrial fibrillation (HCC)  Basic Metabolic Panel   3. Alcohol withdrawal syndrome, with delirium (HCC)  Basic Metabolic Panel   4. Ventricular fibrillation (HCC)  Basic Metabolic Panel   5. Prolonged Q-T interval on ECG     6. Ventricular tachyarrhythmia (HCC)  losartan (COZAAR) 25 MG Tab   7. MANSOOR (obstructive sleep apnea)  losartan (COZAAR) 25 MG Tab   8. ICD (implantable cardioverter-defibrillator), dual, in situ Medtronic Chrome placed 5/18/21  losartan (COZAAR) 25 MG Tab   9. High risk medication use  losartan (COZAAR) 25 MG Tab   10. ACC/AHA stage C systolic heart failure (HCC)  losartan (COZAAR) 25 MG Tab    furosemide (LASIX) 40 MG Tab   11. Essential hypertension, benign  losartan (COZAAR) 25 MG Tab       Medical Decision Making: Today's Assessment/Status/Plan:        51-year-old male with heart failure with reduced ejection fraction alcohol abuse now alcohol free with a now normalized heart function.  I will have him stay on the furosemide.  I do not think his weight gain is due to water however.  He can discuss weight loss medications with his endocrinologist.  His thyroid is almost at goal.  I will see him back in 6 months.

## 2022-05-16 ENCOUNTER — NON-PROVIDER VISIT (OUTPATIENT)
Dept: CARDIOLOGY | Facility: MEDICAL CENTER | Age: 52
End: 2022-05-16
Payer: COMMERCIAL

## 2022-05-16 VITALS
BODY MASS INDEX: 36.03 KG/M2 | SYSTOLIC BLOOD PRESSURE: 112 MMHG | DIASTOLIC BLOOD PRESSURE: 70 MMHG | WEIGHT: 266 LBS | OXYGEN SATURATION: 98 % | HEART RATE: 80 BPM | RESPIRATION RATE: 14 BRPM | HEIGHT: 72 IN

## 2022-05-16 DIAGNOSIS — E03.8 SUBCLINICAL HYPOTHYROIDISM: ICD-10-CM

## 2022-05-16 DIAGNOSIS — Z95.810 ICD (IMPLANTABLE CARDIOVERTER-DEFIBRILLATOR), DUAL, IN SITU: ICD-10-CM

## 2022-05-16 DIAGNOSIS — I47.20 VENTRICULAR TACHYARRHYTHMIA (HCC): ICD-10-CM

## 2022-05-16 DIAGNOSIS — I49.01 VENTRICULAR FIBRILLATION (HCC): ICD-10-CM

## 2022-05-16 PROBLEM — Z79.899 HIGH RISK MEDICATION USE: Status: RESOLVED | Noted: 2021-11-04 | Resolved: 2022-05-16

## 2022-05-16 PROBLEM — E87.8 ELECTROLYTE IMBALANCE: Status: RESOLVED | Noted: 2021-05-17 | Resolved: 2022-05-16

## 2022-05-16 PROBLEM — F10.939 ALCOHOL WITHDRAWAL (HCC): Status: RESOLVED | Noted: 2021-05-15 | Resolved: 2022-05-16

## 2022-05-16 PROBLEM — J96.01 ACUTE RESPIRATORY FAILURE WITH HYPOXIA (HCC): Status: RESOLVED | Noted: 2021-05-14 | Resolved: 2022-05-16

## 2022-05-16 PROCEDURE — 93283 PRGRMG EVAL IMPLANTABLE DFB: CPT | Performed by: NURSE PRACTITIONER

## 2022-05-16 RX ORDER — METOPROLOL SUCCINATE 50 MG/1
TABLET, EXTENDED RELEASE ORAL
COMMUNITY
Start: 2022-04-11 | End: 2022-05-16

## 2022-05-16 RX ORDER — LEVOTHYROXINE SODIUM 50 MCG
50 TABLET ORAL
Qty: 30 TABLET | Refills: 5 | Status: SHIPPED | OUTPATIENT
Start: 2022-05-16 | End: 2022-09-02

## 2022-05-16 ASSESSMENT — FIBROSIS 4 INDEX: FIB4 SCORE: 0.92

## 2022-05-16 NOTE — PROGRESS NOTES
Device is working normally.  No device therapy.  No mode switching episodes.  Normal sensing and capture of RA and RV leads; stable impedances. Battery charge time is 11.6 seconds; battery longevity is 6 years.  No changes are made today.    He is connected via [employer] on [employer].    He does have follow-up with Dr. Duke in October 2022.    Follow-up in 6 months for next AICD check with me.

## 2022-05-27 ENCOUNTER — NON-PROVIDER VISIT (OUTPATIENT)
Dept: CARDIOLOGY | Facility: MEDICAL CENTER | Age: 52
End: 2022-05-27
Payer: COMMERCIAL

## 2022-05-27 PROCEDURE — 93295 DEV INTERROG REMOTE 1/2/MLT: CPT | Performed by: INTERNAL MEDICINE

## 2022-06-01 ENCOUNTER — OFFICE VISIT (OUTPATIENT)
Dept: ENDOCRINOLOGY | Facility: MEDICAL CENTER | Age: 52
End: 2022-06-01
Attending: INTERNAL MEDICINE
Payer: COMMERCIAL

## 2022-06-01 VITALS
HEIGHT: 72 IN | WEIGHT: 264 LBS | HEART RATE: 90 BPM | BODY MASS INDEX: 35.76 KG/M2 | OXYGEN SATURATION: 93 % | DIASTOLIC BLOOD PRESSURE: 80 MMHG | SYSTOLIC BLOOD PRESSURE: 122 MMHG

## 2022-06-01 DIAGNOSIS — E03.8 SUBCLINICAL HYPOTHYROIDISM: ICD-10-CM

## 2022-06-01 DIAGNOSIS — E04.1 LEFT THYROID NODULE: ICD-10-CM

## 2022-06-01 DIAGNOSIS — E06.3 HASHIMOTO'S THYROIDITIS: ICD-10-CM

## 2022-06-01 PROCEDURE — 99211 OFF/OP EST MAY X REQ PHY/QHP: CPT | Performed by: INTERNAL MEDICINE

## 2022-06-01 PROCEDURE — 99214 OFFICE O/P EST MOD 30 MIN: CPT | Performed by: INTERNAL MEDICINE

## 2022-06-01 ASSESSMENT — PATIENT HEALTH QUESTIONNAIRE - PHQ9: CLINICAL INTERPRETATION OF PHQ2 SCORE: 0

## 2022-06-01 ASSESSMENT — FIBROSIS 4 INDEX: FIB4 SCORE: 0.92

## 2022-06-01 NOTE — CARDIAC REMOTE MONITOR - SCAN
Device transmission reviewed. Device demonstrated appropriate function.       Electronically Signed by: Colleen Armstrong M.D.    6/2/2022  2:39 PM

## 2022-06-02 NOTE — PROGRESS NOTES
Chief Complaint: Follow up for Primary Hypothyroidism possibly related to amiodarone with a background of mild Hashimoto's thyroiditis based on elevated TPO antibodies.  He also has an intermediate suspicion hypoechoic solid nodule on the left lobe    HPI:     Ross Blake is a 51 y.o. male here for follow up of the above medical issues    Comorbid issues include alcohol related cardiomyopathy with acute systolic heart failure now improved status post ICD placement, obesity, sleep apnea, history of amiodarone therapy.    He was found to have an elevated TSH of 9.6 on November 15, 2021  He has a family history of hypothyroidism but his mom, maternal grandmother, maternal grandfather and cousins  Baseline labs showed slightly elevated TPO antibodies of 11    He also previously reported low testosterone levels on March 15, 2021 but on retesting his repeat testosterone levels were greater than 600 on December 2021      Currently remains on Synthroid 50 MCG daily which has been his dose since his initial consultation visit  He reports good compliance and denies missing any daily doses    He reports improved energy levels  He is still having some difficulty losing weight  He continues to be on losartan and metoprolol and Lasix  Eplerenone and Farxiga has been discontinued by his cardiologist Dr. Duke  His EF has improved to 65% on September 2021      Most recent TSH is slightly low but improved at 0.33 with a free T4 1.28 on March 20, 2022      Incidentally ultrasound in the office on March 28, 2022 showed suspicious hypoechoic solid nodule with irregular margins measuring 1.37 cm located on the left lower lobe.  He is scheduled for ultrasound-guided biopsy in the office on August 2022        Patient's medications, allergies, and social histories were reviewed and updated as appropriate.      ROS:     CONS:     No fever, no chills   EYES:     No diplopia, no blurry vision   CV:           No chest pain, no  palpitations   PULM:     No SOB, no cough, no hemoptysis.   GI:            No nausea, no vomiting, no diarrhea, no constipation   ENDO:     No polyuria, no polydipsia, no heat intolerance, no cold intolerance       Past Medical History:  Problem List:  2021: Subclinical hypothyroidism  2021: High risk medication use  2021: ICD (implantable cardioverter-defibrillator), dual, in situ   Medtronic Chrome placed 21: Electrolyte imbalance  2021: Ventricular tachyarrhythmia ([facility])  2021: MANSOOR (obstructive sleep apnea)  2021: Alcohol withdrawal ([facility])  2021: Ventricular fibrillation ([facility])  2021: Prolonged Q-T interval on ECG  2021: Acute systolic heart failure ([facility])  2021: A-fib ([facility])  2021: Acute respiratory failure with hypoxia ([facility])  2018: Obesity (BMI 30-39.9)      Past Surgical History:  No past surgical history on file.     Allergies:  Eplerenone and Amiodarone     Social History:  Social History     Tobacco Use   • Smoking status: Former Smoker     Packs/day: 0.50     Years: 10.00     Pack years: 5.00     Types: Cigarettes     Quit date:      Years since quittin.4   • Smokeless tobacco: Never Used   Vaping Use   • Vaping Use: Former   • Quit date: 2021   Substance Use Topics   • Alcohol use: Not Currently   • Drug use: Yes     Types: Marijuana     Comment: RARE        Family History:   family history is not on file.      PHYSICAL EXAM:   Vital signs: /80 (BP Location: Left arm, Patient Position: Sitting, BP Cuff Size: Adult)   Pulse 90   Ht 1.829 m (6')   Wt 120 kg (264 lb)   SpO2 93%   BMI 35.80 kg/m²   GENERAL: Well-developed, well-nourished in no apparent distress.   EYE:  No ocular asymmetry, PERRLA  HENT: Pink, moist mucous membranes.    NECK: No thyromegaly.   CARDIOVASCULAR:  No murmurs  LUNGS: Clear breath sounds  ABDOMEN: Soft, nontender   EXTREMITIES: No clubbing, cyanosis, or edema.   NEUROLOGICAL: No gross focal motor abnormalities    LYMPH: No cervical adenopathy palpated.   SKIN: No rashes, lesions.       Labs:  Lab Results   Component Value Date/Time    SODIUM 139 03/28/2022 01:32 PM    POTASSIUM 4.3 03/28/2022 01:32 PM    CHLORIDE 102 03/28/2022 01:32 PM    CO2 25 03/28/2022 01:32 PM    ANION 12.0 03/28/2022 01:32 PM    GLUCOSE 95 03/28/2022 01:32 PM    BUN 13 03/28/2022 01:32 PM    CREATININE 0.82 03/28/2022 01:32 PM    CALCIUM 8.7 03/28/2022 01:32 PM    ASTSGOT 25 03/28/2022 01:32 PM    ALTSGPT 21 03/28/2022 01:32 PM    TBILIRUBIN 0.4 03/28/2022 01:32 PM    ALBUMIN 4.4 03/28/2022 01:32 PM    TOTPROTEIN 6.5 03/28/2022 01:32 PM    GLOBULIN 2.1 03/28/2022 01:32 PM    AGRATIO 2.1 03/28/2022 01:32 PM       Lab Results   Component Value Date/Time    SODIUM 139 03/28/2022 1332    POTASSIUM 4.3 03/28/2022 1332    CHLORIDE 102 03/28/2022 1332    CO2 25 03/28/2022 1332    GLUCOSE 95 03/28/2022 1332    BUN 13 03/28/2022 1332    CREATININE 0.82 03/28/2022 1332    CALCIUM 8.7 03/28/2022 1332    ANION 12.0 03/28/2022 1332       No results found for: CHOLSTRLTOT, TRIGLYCERIDE, HDL, LDL, CHOLHDLRAT, NONHDL    Lab Results   Component Value Date/Time    TSHULTRASEN 0.330 (L) 03/28/2022 1332     Lab Results   Component Value Date/Time    FREET4 1.28 03/28/2022 1332     Lab Results   Component Value Date/Time    FREET3 3.76 03/15/2021 0710     No results found for: THYSTIMIG    Lab Results   Component Value Date/Time    MICROSOMALA 11.0 (H) 12/02/2021 0918         Imaging:       3/28/2022 9:05 AM     HISTORY/REASON FOR EXAM: History of hypothyroidism        TECHNIQUE/EXAM DESCRIPTION:  Ultrasound of the soft tissues of the head and neck.     COMPARISON:  None     FINDINGS:  The thyroid gland is homogeneous.  Vascularity is normal.     The right lobe of the thyroid gland measures 5.23 cm x 1.35 cm x 2.59 cm cm.  The left lobe of the thyroid gland measures 5.33 cm x 1.63 cm x 2.17 cm cm.  The isthmus measures 0.16 cm cm.     Nodules >= 1cm:        Nodule  #1  There is a(n) hypoechoic wider than tall solid nodule with ill-defined margins and no echogenic foci measuring 1.37 x 0.76 x 1.28 cm located on the left lower lobe.     There are no suspicious lateral neck node seen     IMPRESSION:     Homogenous thyroid gland with dominant: intermediate suspicion hypoechoic solid nodule measuring 1.37 cm located on the left lower lobe.     BRITTNEY Recommendations  Schedule for biopsy in the office  Observation - repeat US in 6 to 12 months in the office.           US report completed and dictated by  Luther Mercado MD, ALEX, DOMONIQUE        ASSESSMENT/PLAN:     1. Subclinical hypothyroidism  Significantly improved  TSH is slightly low but not severely suppressed  Continue Synthroid 50 MCG daily  Repeat thyroid labs in 6 months    2. Hashimoto's thyroiditis  Patient has slightly elevated TPO antibodies and a family history of autoimmune thyroid disease  This is contributory to his thyroid dysfunction aside from amiodarone therapy    3. Left thyroid nodule  Intermediate risk deeply hypoechoic nodule with irregular margins seen on left lower lobe  Biopsy is scheduled  Proceed with biopsy as planned      Return in about 6 months (around 12/1/2022).      Thank you kindly for allowing me to participate in the thyroid care plan for this patient.    Luther Mercado MD, ALEX, ECNU  06/01/22    CC:   Ean Macario M.D.

## 2022-07-13 ENCOUNTER — OFFICE VISIT (OUTPATIENT)
Dept: URGENT CARE | Facility: PHYSICIAN GROUP | Age: 52
End: 2022-07-13
Payer: COMMERCIAL

## 2022-07-13 VITALS
RESPIRATION RATE: 20 BRPM | TEMPERATURE: 98.9 F | HEIGHT: 72 IN | WEIGHT: 253 LBS | DIASTOLIC BLOOD PRESSURE: 78 MMHG | HEART RATE: 74 BPM | BODY MASS INDEX: 34.27 KG/M2 | SYSTOLIC BLOOD PRESSURE: 148 MMHG | OXYGEN SATURATION: 98 %

## 2022-07-13 DIAGNOSIS — K86.3 PANCREATIC PSEUDOCYST: ICD-10-CM

## 2022-07-13 DIAGNOSIS — R11.0 NAUSEA: ICD-10-CM

## 2022-07-13 DIAGNOSIS — R10.12 LUQ PAIN: ICD-10-CM

## 2022-07-13 PROCEDURE — 99203 OFFICE O/P NEW LOW 30 MIN: CPT | Performed by: PHYSICIAN ASSISTANT

## 2022-07-13 RX ORDER — ONDANSETRON 4 MG/1
4 TABLET, FILM COATED ORAL EVERY 6 HOURS PRN
Qty: 12 TABLET | Refills: 0 | Status: SHIPPED | OUTPATIENT
Start: 2022-07-13 | End: 2022-07-16

## 2022-07-13 RX ORDER — ONDANSETRON 4 MG/1
4 TABLET, ORALLY DISINTEGRATING ORAL ONCE
Status: COMPLETED | OUTPATIENT
Start: 2022-07-13 | End: 2022-07-13

## 2022-07-13 RX ORDER — KETOROLAC TROMETHAMINE 30 MG/ML
30 INJECTION, SOLUTION INTRAMUSCULAR; INTRAVENOUS ONCE
Status: COMPLETED | OUTPATIENT
Start: 2022-07-13 | End: 2022-07-13

## 2022-07-13 RX ADMIN — KETOROLAC TROMETHAMINE 30 MG: 30 INJECTION, SOLUTION INTRAMUSCULAR; INTRAVENOUS at 19:08

## 2022-07-13 RX ADMIN — ONDANSETRON 4 MG: 4 TABLET, ORALLY DISINTEGRATING ORAL at 19:08

## 2022-07-13 ASSESSMENT — FIBROSIS 4 INDEX: FIB4 SCORE: 0.92

## 2022-07-14 NOTE — PROGRESS NOTES
Subjective:   Ross Blake is a 51 y.o. male who presents for RLQ Pain (This morning- Pt has been since for this before at [facility] )     Severe LUQ pain since this morning; mild n/v  H/o pseudocyst seen on imaging at [facility] in 11/21 during hospital admission for similar symptoms  Was referred to GI but did not follow up as symptoms improved, he stopped drinking at this time and has not drank since  Vomited x 2 today  This is the worst episode since admission  No fevers or chills at home.  No painful urination, no hematuria  No ETOH since November   Has not tried any home remedies      Medications:  furosemide Tabs  losartan Tabs  metoprolol SR Tb24  multivitamin-iron-minerals-folic acid  Synthroid Tabs  VITAMIN C GUMMIE PO    Allergies:             Eplerenone and Amiodarone    Surgical History:       No past surgical history on file.    Past Social Hx:  Ross Blake  reports that he quit smoking about 2 years ago. His smoking use included cigarettes. He has a 5.00 pack-year smoking history. He has never used smokeless tobacco. He reports previous alcohol use. He reports previous drug use.     Past Family Hx:   Ross Blake family history is not on file.       Problem list, medications, and allergies reviewed by myself today in Epic.     Objective:     BP (!) 148/78 (BP Location: Left arm, Patient Position: Sitting, BP Cuff Size: Adult)   Pulse 74   Temp 37.2 °C (98.9 °F) (Temporal)   Resp 20   Ht 1.829 m (6')   Wt 115 kg (253 lb)   SpO2 98%   BMI 34.31 kg/m²     Physical Exam  Vitals and nursing note reviewed.   Constitutional:       General: He is not in acute distress.     Appearance: Normal appearance. He is not ill-appearing or toxic-appearing.   HENT:      Mouth/Throat:      Mouth: Mucous membranes are moist.   Cardiovascular:      Rate and Rhythm: Normal rate.      Pulses: Normal pulses.      Heart sounds: Normal heart sounds.   Pulmonary:      Effort: Pulmonary effort is normal. No  respiratory distress.      Breath sounds: Normal breath sounds. No wheezing.   Abdominal:      General: Abdomen is flat. Bowel sounds are normal. There is no distension.      Palpations: Abdomen is soft. There is no splenomegaly, mass or pulsatile mass.      Tenderness: There is abdominal tenderness in the epigastric area and left upper quadrant. There is no right CVA tenderness, left CVA tenderness, guarding or rebound. Negative signs include Benites's sign and McBurney's sign.      Hernia: No hernia is present.      Comments: There is left upper quadrant and epigastric tenderness  There is no guarding, rebound, or rigidity   Musculoskeletal:      Cervical back: No rigidity.   Neurological:      Mental Status: He is alert.         Assessment/Plan:     Diagnosis and Associated Orders:     1. LUQ pain  - Referral to Gastroenterology  - ketorolac (TORADOL) injection 30 mg    2. Pancreatic pseudocyst  - Referral to Gastroenterology  - ketorolac (TORADOL) injection 30 mg    3. Nausea  - ondansetron (ZOFRAN) 4 MG Tab tablet; Take 1 Tablet by mouth every 6 hours as needed for Nausea/Vomiting for up to 3 days.  Dispense: 12 Tablet; Refill: 0  - ondansetron (ZOFRAN ODT) dispertab 4 mg        Comments/MDM:    I reviewed this patient's medical records from [place].  He has a known history of pancreatic pseudocyst for which he did not pursue treatment.  Fortunately he has been doing well until recently.  His abdomen is soft without guarding.  He does have significant left upper quadrant pain.  Labs performed in March demonstrated elevated alkaline phosphatase with normal LFTs.  I discussed with the patient that I do feel he requires additional imaging and work-up given his history.  Patient is not interested in higher level of care.  He understands that I cannot obtain a CT scan this evening given late time in the day.  I did recommend transfer to ED but he declines.  He does feel there may be some dietary related issues and  there was some question and review of his hospitalization of a possible gallbladder involvement.  He does have a query into GI.  I have placed an urgent referral in this regard.  He is requesting Toradol for pain.  We discussed strict ED precautions and dietary precautions.  I recommend clear liquid diet at this time.  I did offer to order outpatient imaging which he declined.  Greater than 38 minutes was spent in the care and coordination as well as chart review and counseling.    I personally reviewed prior external notes and test results pertinent to today's visit.  Red flags discussed as well as indications to present to the Emergency Department.  Supportive care, natural history, differential diagnoses, and indications for immediate follow-up discussed.  Patient expresses understanding and agrees to plan.  Patient denies any other questions or concerns.    Follow-up with the primary care physician for recheck, reevaluation, and consideration of further management.      Please note that this dictation was created using voice recognition software. I have made a reasonable attempt to correct obvious errors, but I expect that there are errors of grammar and possibly content that I did not discover before finalizing the note.    This note was electronically signed by Nadia Gordillo PA-C

## 2022-07-17 ENCOUNTER — OFFICE VISIT (OUTPATIENT)
Dept: URGENT CARE | Facility: PHYSICIAN GROUP | Age: 52
End: 2022-07-17
Payer: COMMERCIAL

## 2022-07-17 ENCOUNTER — APPOINTMENT (OUTPATIENT)
Dept: RADIOLOGY | Facility: IMAGING CENTER | Age: 52
End: 2022-07-17
Attending: STUDENT IN AN ORGANIZED HEALTH CARE EDUCATION/TRAINING PROGRAM
Payer: COMMERCIAL

## 2022-07-17 VITALS
TEMPERATURE: 97.9 F | SYSTOLIC BLOOD PRESSURE: 118 MMHG | RESPIRATION RATE: 20 BRPM | DIASTOLIC BLOOD PRESSURE: 82 MMHG | HEIGHT: 72 IN | OXYGEN SATURATION: 95 % | WEIGHT: 253 LBS | HEART RATE: 94 BPM | BODY MASS INDEX: 34.27 KG/M2

## 2022-07-17 DIAGNOSIS — R10.9 ABDOMINAL PAIN, UNSPECIFIED ABDOMINAL LOCATION: ICD-10-CM

## 2022-07-17 DIAGNOSIS — K86.3 PANCREATIC PSEUDOCYST: ICD-10-CM

## 2022-07-17 DIAGNOSIS — I50.20 ACC/AHA STAGE C SYSTOLIC HEART FAILURE (HCC): ICD-10-CM

## 2022-07-17 PROCEDURE — 74019 RADEX ABDOMEN 2 VIEWS: CPT | Mod: TC | Performed by: PHYSICIAN ASSISTANT

## 2022-07-17 PROCEDURE — 99213 OFFICE O/P EST LOW 20 MIN: CPT | Performed by: STUDENT IN AN ORGANIZED HEALTH CARE EDUCATION/TRAINING PROGRAM

## 2022-07-17 RX ORDER — METOPROLOL SUCCINATE 25 MG/1
25 TABLET, EXTENDED RELEASE ORAL DAILY
Qty: 90 TABLET | Refills: 3 | Status: SHIPPED
Start: 2022-07-17 | End: 2022-11-29

## 2022-07-17 RX ORDER — KETOROLAC TROMETHAMINE 30 MG/ML
30 INJECTION, SOLUTION INTRAMUSCULAR; INTRAVENOUS ONCE
Status: DISCONTINUED | OUTPATIENT
Start: 2022-07-17 | End: 2022-07-17

## 2022-07-17 RX ORDER — KETOROLAC TROMETHAMINE 30 MG/ML
30 INJECTION, SOLUTION INTRAMUSCULAR; INTRAVENOUS ONCE
Status: COMPLETED | OUTPATIENT
Start: 2022-07-17 | End: 2022-07-17

## 2022-07-17 RX ADMIN — KETOROLAC TROMETHAMINE 30 MG: 30 INJECTION, SOLUTION INTRAMUSCULAR; INTRAVENOUS at 12:10

## 2022-07-17 ASSESSMENT — ENCOUNTER SYMPTOMS
SHORTNESS OF BREATH: 0
EYES NEGATIVE: 1
CONSTIPATION: 0
CHILLS: 0
VOMITING: 0
WHEEZING: 0
NAUSEA: 1
FEVER: 0
PALPITATIONS: 0
WEIGHT LOSS: 0
DIARRHEA: 0
MUSCULOSKELETAL NEGATIVE: 1
FOCAL WEAKNESS: 0
HEADACHES: 0
DIZZINESS: 0
WEAKNESS: 0
COUGH: 0
ABDOMINAL PAIN: 1

## 2022-07-17 ASSESSMENT — FIBROSIS 4 INDEX: FIB4 SCORE: 0.92

## 2022-07-17 NOTE — PROGRESS NOTES
Subjective     Ras Blake is a 51 y.o. male who presents with Other (Same cc from 7/13/22/Pt would like Toradol shot until dr gil  )            Patient was seen in urgent care on Wednesday for left upper quadrant pain and nausea/vomiting.  Patient was seen at [facility] and admitted for similar symptoms.  Imaging showed pancreatic pseudocyst.  He was referred to GI but did not initially go due to resolution of symptoms.  Patient given Toradol injection and GI referral in office on Wednesday.  Patient presenting with similar symptoms requesting Toradol injection. Patient says he is going to call GI consultants on Monday.       Review of Systems   Constitutional: Negative for chills, fever, malaise/fatigue and weight loss.   HENT: Negative.    Eyes: Negative.    Respiratory: Negative for cough, shortness of breath and wheezing.    Cardiovascular: Negative for chest pain and palpitations.   Gastrointestinal: Positive for abdominal pain and nausea. Negative for constipation, diarrhea and vomiting.   Genitourinary: Negative.    Musculoskeletal: Negative.    Neurological: Negative for dizziness, focal weakness, weakness and headaches.              Objective     /82 (BP Location: Right arm, Patient Position: Sitting, BP Cuff Size: Adult)   Pulse 94   Temp 36.6 °C (97.9 °F) (Temporal)   Resp 20   Ht 1.829 m (6')   Wt 115 kg (253 lb)   SpO2 95%   BMI 34.31 kg/m²      Physical Exam  Vitals reviewed.   Constitutional:       Appearance: Normal appearance. He is not ill-appearing or toxic-appearing.   HENT:      Mouth/Throat:      Mouth: Mucous membranes are moist.      Pharynx: Oropharynx is clear.   Eyes:      Extraocular Movements: Extraocular movements intact.      Conjunctiva/sclera: Conjunctivae normal.      Pupils: Pupils are equal, round, and reactive to light.   Cardiovascular:      Rate and Rhythm: Normal rate and regular rhythm.      Heart sounds: Normal heart sounds.   Pulmonary:       Effort: Pulmonary effort is normal.      Breath sounds: Normal breath sounds.   Abdominal:      General: Bowel sounds are normal. There is distension.      Palpations: Abdomen is soft.      Tenderness: There is abdominal tenderness in the left upper quadrant and left lower quadrant. There is no guarding or rebound.      Hernia: No hernia is present.   Skin:     General: Skin is warm and dry.      Capillary Refill: Capillary refill takes less than 2 seconds.   Neurological:      General: No focal deficit present.      Mental Status: He is alert and oriented to person, place, and time.                   RADIOLOGY RESULTS   [id number] VIEWS    Result Date: 7/17/2022 7/17/2022 11:15 AM HISTORY/REASON FOR EXAM:  Gastrointestinal Complaint. TECHNIQUE/EXAM DESCRIPTION AND NUMBER OF VIEWS:  2 view(s) of the abdomen. COMPARISON: None FINDINGS: Nonobstructive bowel gas pattern. Moderate amount of stool throughout the colon. No signs of free air. No abnormal calcifications projecting over the field of view. No acute fracture.     Nonobstructive bowel gas pattern. Moderate amount of stool throughout the colon.                  Assessment & Plan            1. Pancreatic pseudocyst  - ketorolac (TORADOL) injection 30 mg    2. Abdominal pain, unspecified abdominal location  - [id number] VIEWS    I discussed with the patient the recommendation of additional imaging and work-up given his past medical history of pancreatic pseudocyst.  Patient does not want higher level of care evaluation in the ED and denies referral for CT scan.  I did recommend transfer to ED but patient declines and just would like Toradol injection until he can follow up with GI. Patient advised on clear liquid diet and educated on strict return to ED precautions. Patient can continue to take Zofran from previous visit for associated nausea.  Patient advised on importance of following up with GI consultants soon as possible in to do additional  imaging/work-up as outpatient or through ED.    [id number] VIEWS  - Per radiology: Nonobstructive bowel gas pattern. Moderate amount of stool throughout the colon.   - Patient educated on supportive measures for gas/constipation in clinic today.    I personally reviewed prior external notes and test results pertinent to today's visit.    Differential diagnoses, supportive care, and indications for immediate follow-up discussed with patient.     Pathogenesis of diagnosis discussed including typical length and natural progression.  Instructed to return to urgent care or nearest emergency department if symptoms fail to improve, for any change in condition, further concerns, or new concerning symptoms.    Patient states understanding and agrees with the plan of care and discharge instructions.

## 2022-07-19 ENCOUNTER — TELEPHONE (OUTPATIENT)
Dept: ENDOCRINOLOGY | Facility: MEDICAL CENTER | Age: 52
End: 2022-07-19
Payer: COMMERCIAL

## 2022-07-19 NOTE — TELEPHONE ENCOUNTER
Patient called and wants to know if Dr Mercado wants him to follow up after he gets his biopsy. He had a biopsy scheduled for August but canceled. He wants to reschedule but needs to know if Dr Mercado wants his follow up to be after the ultrasound or his regular 3-6mth follow up visit.

## 2022-07-24 ENCOUNTER — HOSPITAL ENCOUNTER (EMERGENCY)
Facility: MEDICAL CENTER | Age: 52
End: 2022-07-24
Attending: STUDENT IN AN ORGANIZED HEALTH CARE EDUCATION/TRAINING PROGRAM
Payer: COMMERCIAL

## 2022-07-24 VITALS
BODY MASS INDEX: 32.91 KG/M2 | TEMPERATURE: 98.4 F | SYSTOLIC BLOOD PRESSURE: 124 MMHG | RESPIRATION RATE: 16 BRPM | HEIGHT: 72 IN | HEART RATE: 92 BPM | OXYGEN SATURATION: 96 % | WEIGHT: 242.95 LBS | DIASTOLIC BLOOD PRESSURE: 80 MMHG

## 2022-07-24 DIAGNOSIS — R10.32 LEFT LOWER QUADRANT ABDOMINAL PAIN: ICD-10-CM

## 2022-07-24 LAB
ALBUMIN SERPL BCP-MCNC: 3.9 G/DL (ref 3.2–4.9)
ALBUMIN/GLOB SERPL: 1.1 G/DL
ALP SERPL-CCNC: 95 U/L (ref 30–99)
ALT SERPL-CCNC: 7 U/L (ref 2–50)
ANION GAP SERPL CALC-SCNC: 15 MMOL/L (ref 7–16)
AST SERPL-CCNC: 14 U/L (ref 12–45)
BASOPHILS # BLD AUTO: 0.2 % (ref 0–1.8)
BASOPHILS # BLD: 0.03 K/UL (ref 0–0.12)
BILIRUB SERPL-MCNC: 0.6 MG/DL (ref 0.1–1.5)
BUN SERPL-MCNC: 15 MG/DL (ref 8–22)
CALCIUM SERPL-MCNC: 8.8 MG/DL (ref 8.4–10.2)
CHLORIDE SERPL-SCNC: 98 MMOL/L (ref 96–112)
CO2 SERPL-SCNC: 19 MMOL/L (ref 20–33)
CREAT SERPL-MCNC: 0.72 MG/DL (ref 0.5–1.4)
EOSINOPHIL # BLD AUTO: 0.14 K/UL (ref 0–0.51)
EOSINOPHIL NFR BLD: 1 % (ref 0–6.9)
ERYTHROCYTE [DISTWIDTH] IN BLOOD BY AUTOMATED COUNT: 43.8 FL (ref 35.9–50)
GFR SERPLBLD CREATININE-BSD FMLA CKD-EPI: 111 ML/MIN/1.73 M 2
GLOBULIN SER CALC-MCNC: 3.5 G/DL (ref 1.9–3.5)
GLUCOSE SERPL-MCNC: 137 MG/DL (ref 65–99)
HCT VFR BLD AUTO: 39.4 % (ref 42–52)
HGB BLD-MCNC: 13.3 G/DL (ref 14–18)
IMM GRANULOCYTES # BLD AUTO: 0.06 K/UL (ref 0–0.11)
IMM GRANULOCYTES NFR BLD AUTO: 0.4 % (ref 0–0.9)
LIPASE SERPL-CCNC: 109 U/L (ref 7–58)
LYMPHOCYTES # BLD AUTO: 0.94 K/UL (ref 1–4.8)
LYMPHOCYTES NFR BLD: 6.6 % (ref 22–41)
MCH RBC QN AUTO: 29 PG (ref 27–33)
MCHC RBC AUTO-ENTMCNC: 33.8 G/DL (ref 33.7–35.3)
MCV RBC AUTO: 85.8 FL (ref 81.4–97.8)
MONOCYTES # BLD AUTO: 1.26 K/UL (ref 0–0.85)
MONOCYTES NFR BLD AUTO: 8.8 % (ref 0–13.4)
NEUTROPHILS # BLD AUTO: 11.89 K/UL (ref 1.82–7.42)
NEUTROPHILS NFR BLD: 83 % (ref 44–72)
NRBC # BLD AUTO: 0 K/UL
NRBC BLD-RTO: 0 /100 WBC
PLATELET # BLD AUTO: 273 K/UL (ref 164–446)
PMV BLD AUTO: 9.5 FL (ref 9–12.9)
POTASSIUM SERPL-SCNC: 3.4 MMOL/L (ref 3.6–5.5)
PROT SERPL-MCNC: 7.4 G/DL (ref 6–8.2)
RBC # BLD AUTO: 4.59 M/UL (ref 4.7–6.1)
SODIUM SERPL-SCNC: 132 MMOL/L (ref 135–145)
WBC # BLD AUTO: 14.3 K/UL (ref 4.8–10.8)

## 2022-07-24 PROCEDURE — 99284 EMERGENCY DEPT VISIT MOD MDM: CPT

## 2022-07-24 PROCEDURE — 85025 COMPLETE CBC W/AUTO DIFF WBC: CPT

## 2022-07-24 PROCEDURE — 83690 ASSAY OF LIPASE: CPT

## 2022-07-24 PROCEDURE — 80053 COMPREHEN METABOLIC PANEL: CPT

## 2022-07-24 PROCEDURE — 700111 HCHG RX REV CODE 636 W/ 250 OVERRIDE (IP): Performed by: STUDENT IN AN ORGANIZED HEALTH CARE EDUCATION/TRAINING PROGRAM

## 2022-07-24 PROCEDURE — 96374 THER/PROPH/DIAG INJ IV PUSH: CPT

## 2022-07-24 PROCEDURE — 36415 COLL VENOUS BLD VENIPUNCTURE: CPT

## 2022-07-24 RX ORDER — POLYETHYLENE GLYCOL 3350, SODIUM SULFATE ANHYDROUS, SODIUM BICARBONATE, SODIUM CHLORIDE, POTASSIUM CHLORIDE 236; 22.74; 6.74; 5.86; 2.97 G/4L; G/4L; G/4L; G/4L; G/4L
240 POWDER, FOR SOLUTION ORAL ONCE
Qty: 240 ML | Refills: 0 | Status: SHIPPED | OUTPATIENT
Start: 2022-07-24 | End: 2022-07-24

## 2022-07-24 RX ORDER — KETOROLAC TROMETHAMINE 10 MG/1
10 TABLET, FILM COATED ORAL EVERY 6 HOURS PRN
Qty: 20 TABLET | Refills: 0 | Status: SHIPPED | OUTPATIENT
Start: 2022-07-24 | End: 2022-09-27

## 2022-07-24 RX ORDER — KETOROLAC TROMETHAMINE 30 MG/ML
15 INJECTION, SOLUTION INTRAMUSCULAR; INTRAVENOUS ONCE
Status: COMPLETED | OUTPATIENT
Start: 2022-07-24 | End: 2022-07-24

## 2022-07-24 RX ADMIN — KETOROLAC TROMETHAMINE 15 MG: 30 INJECTION, SOLUTION INTRAMUSCULAR; INTRAVENOUS at 06:06

## 2022-07-24 ASSESSMENT — FIBROSIS 4 INDEX: FIB4 SCORE: 0.92

## 2022-07-24 ASSESSMENT — PAIN DESCRIPTION - PAIN TYPE: TYPE: ACUTE PAIN

## 2022-07-24 NOTE — ED NOTES
PT presents with LLQ abdominal pain that radiates to the back.  PT was seen at the hospital earlier today.  PT denies chest pain, SOB, fever/chills, N/V.  Last bowel movement X4 days ago.

## 2022-07-24 NOTE — ED PROVIDER NOTES
CHIEF COMPLAINT  Chief Complaint   Patient presents with   • Nausea   • Abdominal Pain   • Constipation     Pt presents for evaluation of LLQ and LUQ and L flank pain. Pt has been seen at [facility] and [facility] a few times in the last week. Pt reports a hx of pancreatic pseudocyst. Pt states he was dx constipation and the pancreatic pseudocyst. Pt reports he was referred to a GI Dr, who he saw on Monday but states he only ordered a CT, labs and US. He had these done at [facility]. Pt reports poor appetite and states the pain has been too bad.    • Flank Pain       HPI  Ross Blake is a 51 y.o. male who presents evaluation of constipation and left lower quadrant and left upper quadrant abdominal pain.  Patient states that he has a prior history of a pancreatic pseudocyst was admitted to [facility] on 7/20/2022 for acute pancreatitis, received multiple doses of IV narcotics, eventually had improvement of his left upper quadrant abdominal pain, and was tolerating p.o., he was discharged.  Since being discharged she has not had a bowel movement though is passing gas.he went to [facility] this evening where he had labs well as a plain film x-ray.  Which was remarkable for constipation.  Was discharged on magnesium and a Fleet enema which he used at home though did not have a bowel movement and had persistent pain so he returned to the emergency department for evaluation.  Patient denies any fevers, nausea vomiting, dysuria, increased urinary frequency, or urgency.      Review of records at outside facility demonstrate a CT abdomen pelvis 3 days ago which was remarkable for a stable pancreatic pseudocyst as well as pancreatitis    REVIEW OF SYSTEMS  See HPI for further details. All other systems are negative.     PAST MEDICAL HISTORY       SOCIAL HISTORY  Social History     Tobacco Use   • Smoking status: Former Smoker     Packs/day: 0.50     Years: 10.00     Pack years: 5.00     Types: Cigarettes     Quit date:  2020     Years since quittin.5   • Smokeless tobacco: Never Used   Vaping Use   • Vaping Use: Former   • Quit date: 2021   Substance and Sexual Activity   • Alcohol use: Not Currently   • Drug use: Not Currently   • Sexual activity: Not on file       SURGICAL HISTORY  patient denies any surgical history    CURRENT MEDICATIONS  Home Medications    **Home medications have not yet been reviewed for this encounter**         ALLERGIES  Allergies   Allergen Reactions   • Eplerenone      Gynecomastia   • Amiodarone      TSH elevated       FAMILY HISTORY  No pertinent family history    PHYSICAL EXAM   BP (!) 140/97   Pulse (!) 109   Temp 36.8 °C (98.2 °F) (Temporal)   Resp 18   Ht 1.829 m (6')   Wt 110 kg (242 lb 15.2 oz)   SpO2 100%   BMI 32.95 kg/m²  @[id number]::@   Pulse ox interpretation: I interpret this pulse ox as normal.  VITALS - vital signs documented prior to this note have been reviewed and noted,  GENERAL - awake, alert, oriented, GCS 15, no apparent distress, non-toxic  appearing  HEENT - normocephalic, atraumatic, pupils equal, sclera anicteric, mucus  membranes moist  NECK - supple, no meningismus, full active range of motion, trachea midline  CARDIOVASCULAR - regular rate/rhythm, no murmurs/gallops/rubs  PULMONARY - no respiratory distress, speaking in full sentences, clear to  auscultation bilaterally, no wheezing/ronchi/rales, no accessory muscle use  GASTROINTESTINAL - bowel sounds are hypoactive he has tenderness with palpation of his left lower left upper quadrant no rebound guarding or peritonitis no McBurney's point tenderness and abdomen is otherwise soft, non-tender, non-distended, no rebound, guarding,  or peritonitis  GENITOURINARY - no rectal impaction brown stool in the vault  NEUROLOGIC - Awake alert, normal mental status, speech fluid, cognition  normal, moves all extremities  MUSCULOSKELETAL - no obvious asymmetry or deformities present  EXTREMITIES - warm, well-perfused, no  cyanosis or significant edema  DERMATOLOGIC - warm, dry, no rashes, no jaundice  PSYCHIATRIC - normal affect, normal insight, normal concentration            LABS      Labs Reviewed - No data to display      Pertinent Labs & Imaging studies reviewed. (See chart for details)    RADIOLOGY  No orders to display             ED COURSE/PROCEDURES      Reevaluation at 0550 patient's abdominal pain has significantly improved he has had several large bowel movements after soapsuds enema repeat abdominal exam is reassuring demonstrating no rebound guarding or peritonitis shared decision-making conversation had with patient did feel comfortable with at home management and deferring of repeat CT imaging at this time and lieu of symptomatic treatment and returning for an abdominal recheck.    Medications - No data to display            MEDICAL DECISION MAKING    Patient presented for evaluation of left upper and left lower quadrant abdominal pain with associated constipation in the setting of a recent diagnosis and treatment for an acute pancreatitis.  His abdominal pain seems mostly located in the left lower quadrant.  Labs were obtained were remarkable for a mildly elevated lipase, though he was recently admitted for pancreatitis on my examination no significant left upper quadrant pain.  Pain seems mostly located in his left lower quadrant.  Was seen at [facility] last night did have plain film radiographs which were consistent with constipation as such she was given a soapsuds enema and observed in the emergency department.  After the enema he had several large bowel movements with improvement of his abdominal discomfort and his repeat abdominal exam was reassuring he does have a recent CT scan from 3 days ago, thus after shared decision-making conversation did feel comfortable deferring repeat imaging.  Patient will be discharged with symptomatic care given gastroenterology referral and strict return precautions. Patient  requested a prescription for toradol for his pain. Did discuss increased side effect profile including GI bleeding and he felt comfortable with that risk.  All pertinent return precautions were discussed with the patient, and they expressed understanding.  Patient was discharged in a stable condition            FINAL IMPRESSION  1.  Abdominal pain  2.  Leukocytosis   3.  Pancreatitis         Electronically signed by: Estrada Wolf D.O., 7/24/2022 4:28 AM      Dictation Disclaimer  Please note this report has been produced using speech recognition software and  may contain errors related to that system, including errors seen in grammar,  punctuation and spelling, as well as words and phrases that may be inappropriate.  If there are any questions or concerns, please feel free to contact the dictating  physician for clarification.

## 2022-07-24 NOTE — ED NOTES
ERP with me as chaperone is in to perform rectal and check for impaction. None noted. Enema started following. Pt was able to receive full bag and is currently retaining. Will continue to monitor.    age(85 years old or older)

## 2022-07-24 NOTE — ED TRIAGE NOTES
Chief Complaint   Patient presents with   • Nausea   • Abdominal Pain   • Constipation     Pt presents for evaluation of LLQ and LUQ and L flank pain. Pt [facility] been seen [facility] a few times in the last week. Pt reports a hx of pancreatic pseudocyst. Pt states he was dx constipation and the pancreatic pseudocyst. Pt reports he was referred to a GI Dr, who he saw on Monday but states he only ordered a CT, labs and US. He had these done at [facility]. Pt reports poor appetite and states the pain has been too bad.    • Flank Pain     Blood Pressure (Abnormal) 140/97   Pulse (Abnormal) 109   Temperature 36.8 °C (98.2 °F) (Temporal)   Respiration 18   Height 1.829 m (6')   Weight 110 kg (242 lb 15.2 oz)   Oxygen Saturation 100%   Body Mass Index 32.95 kg/m²

## 2022-07-24 NOTE — DISCHARGE INSTRUCTIONS
Take the Toradol with food if you develop any tarry stool significant abdominal pain return to the ER for a recheck.  If your pain does not improve in the next 24 hours return for recheck.  Follow-up with gastroenterology take the medications as we discussed return with any other new or concerning symptoms.

## 2022-07-24 NOTE — ED NOTES
Pt verbalized understanding of d/c instructions, meds to  and f/u. Pt left ambulatory with a steady gait.

## 2022-07-25 ENCOUNTER — HOSPITAL ENCOUNTER (OUTPATIENT)
Dept: RADIOLOGY | Facility: MEDICAL CENTER | Age: 52
End: 2022-07-25
Attending: PHYSICIAN ASSISTANT
Payer: COMMERCIAL

## 2022-07-25 DIAGNOSIS — R10.9 ABDOMINAL PAIN, UNSPECIFIED ABDOMINAL LOCATION: ICD-10-CM

## 2022-07-25 PROCEDURE — 74021 RADEX ABDOMEN 3+ VIEWS: CPT

## 2022-08-15 ENCOUNTER — HOSPITAL ENCOUNTER (OUTPATIENT)
Dept: LAB | Facility: MEDICAL CENTER | Age: 52
End: 2022-08-15
Attending: INTERNAL MEDICINE
Payer: COMMERCIAL

## 2022-08-15 DIAGNOSIS — E03.8 SUBCLINICAL HYPOTHYROIDISM: ICD-10-CM

## 2022-08-15 DIAGNOSIS — E04.1 LEFT THYROID NODULE: ICD-10-CM

## 2022-08-15 DIAGNOSIS — E06.3 HASHIMOTO'S THYROIDITIS: ICD-10-CM

## 2022-08-15 LAB
ALBUMIN SERPL BCP-MCNC: 4.2 G/DL (ref 3.2–4.9)
ALBUMIN/GLOB SERPL: 1.4 G/DL
ALP SERPL-CCNC: 113 U/L (ref 30–99)
ALT SERPL-CCNC: 12 U/L (ref 2–50)
ANION GAP SERPL CALC-SCNC: 11 MMOL/L (ref 7–16)
AST SERPL-CCNC: 15 U/L (ref 12–45)
BILIRUB SERPL-MCNC: 0.3 MG/DL (ref 0.1–1.5)
BUN SERPL-MCNC: 10 MG/DL (ref 8–22)
CALCIUM SERPL-MCNC: 9 MG/DL (ref 8.5–10.5)
CHLORIDE SERPL-SCNC: 102 MMOL/L (ref 96–112)
CO2 SERPL-SCNC: 26 MMOL/L (ref 20–33)
CREAT SERPL-MCNC: 0.78 MG/DL (ref 0.5–1.4)
GFR SERPLBLD CREATININE-BSD FMLA CKD-EPI: 109 ML/MIN/1.73 M 2
GLOBULIN SER CALC-MCNC: 3 G/DL (ref 1.9–3.5)
GLUCOSE SERPL-MCNC: 118 MG/DL (ref 65–99)
POTASSIUM SERPL-SCNC: 3.9 MMOL/L (ref 3.6–5.5)
PROT SERPL-MCNC: 7.2 G/DL (ref 6–8.2)
SODIUM SERPL-SCNC: 139 MMOL/L (ref 135–145)
T4 FREE SERPL-MCNC: 1.33 NG/DL (ref 0.93–1.7)
TSH SERPL DL<=0.005 MIU/L-ACNC: 0.08 UIU/ML (ref 0.38–5.33)

## 2022-08-15 PROCEDURE — 84443 ASSAY THYROID STIM HORMONE: CPT

## 2022-08-15 PROCEDURE — 84439 ASSAY OF FREE THYROXINE: CPT

## 2022-08-15 PROCEDURE — 80053 COMPREHEN METABOLIC PANEL: CPT

## 2022-08-15 PROCEDURE — 36415 COLL VENOUS BLD VENIPUNCTURE: CPT

## 2022-08-30 ENCOUNTER — OFFICE VISIT (OUTPATIENT)
Dept: ENDOCRINOLOGY | Facility: MEDICAL CENTER | Age: 52
End: 2022-08-30
Attending: INTERNAL MEDICINE
Payer: COMMERCIAL

## 2022-08-30 VITALS
RESPIRATION RATE: 16 BRPM | HEIGHT: 72 IN | BODY MASS INDEX: 32.91 KG/M2 | DIASTOLIC BLOOD PRESSURE: 64 MMHG | WEIGHT: 243 LBS | HEART RATE: 93 BPM | SYSTOLIC BLOOD PRESSURE: 98 MMHG | OXYGEN SATURATION: 93 %

## 2022-08-30 DIAGNOSIS — E04.1 LEFT THYROID NODULE: ICD-10-CM

## 2022-08-30 DIAGNOSIS — E06.3 HASHIMOTO'S THYROIDITIS: ICD-10-CM

## 2022-08-30 DIAGNOSIS — E03.8 SUBCLINICAL HYPOTHYROIDISM: ICD-10-CM

## 2022-08-30 PROCEDURE — 99211 OFF/OP EST MAY X REQ PHY/QHP: CPT | Performed by: INTERNAL MEDICINE

## 2022-08-30 PROCEDURE — 99214 OFFICE O/P EST MOD 30 MIN: CPT | Performed by: INTERNAL MEDICINE

## 2022-08-30 ASSESSMENT — FIBROSIS 4 INDEX: FIB4 SCORE: 0.79

## 2022-08-30 NOTE — PROGRESS NOTES
Chief Complaint: Follow up for Primary Hypothyroidism possibly related to amiodarone with a background of mild Hashimoto's thyroiditis based on elevated TPO antibodies.  He also has an intermediate suspicion hypoechoic solid nodule on the left lobe    HPI:     Ross Blake is a 52 y.o. male here for follow up of the above medical issues    Comorbid issues include alcohol related cardiomyopathy with acute systolic heart failure now improved status post ICD placement, obesity, sleep apnea, history of amiodarone therapy.    He had an elevated TSH of 9.6 on November 15, 2021  He has a family history of hypothyroidism  w/ his mom, maternal grandmother, maternal grandfather and cousins  Baseline labs showed slightly elevated TPO antibodies of 11    He also previously reported low testosterone levels on March 15, 2021 but on retesting his repeat testosterone levels were greater than 600 on December 2021          Currently remains on Synthroid 50 MCG daily   which has been his dose for at least 6 months  He reports good compliance and denies missing any daily doses    He reports good energy levels  He reports intentional weight loss   He continues to be on losartan and metoprolol and Lasix  His EF has improved to 65% on September 2021      TSH is 0.080 on 8/2022    TSH was 0.33 with a free T4 1.28 on March 20, 2022      Incidentally ultrasound in the office on March 28, 2022 showed suspicious hypoechoic solid nodule with irregular margins measuring 1.37 cm located on the left lower lobe.  He was scheduled for ultrasound-guided biopsy in the office on August 2022 but he cancelled his appointment         Patient's medications, allergies, and social histories were reviewed and updated as appropriate.      ROS:     CONS:     No fever, no chills   EYES:     No diplopia, no blurry vision   CV:           No chest pain, no palpitations   PULM:     No SOB, no cough, no hemoptysis.   GI:            No nausea, no vomiting, no  diarrhea, no constipation   ENDO:     No polyuria, no polydipsia, no heat intolerance, no cold intolerance       Past Medical History:  Problem List:  2022: Left thyroid nodule  2021: Subclinical hypothyroidism  2021: High risk medication use  2021: ICD (implantable cardioverter-defibrillator), dual, in situ   [employer]tronic Chrome placed 21: Electrolyte imbalance  2021: Ventricular tachyarrhythmia ([facility])  2021: MANSOOR (obstructive sleep apnea)  2021: Alcohol withdrawal ([facility])  2021: Ventricular fibrillation ([facility])  2021: Prolonged Q-T interval on ECG  2021: Acute systolic heart failure ([facility])  2021: A-fib ([facility])  2021: Acute respiratory failure with hypoxia ([facility])  2018: Obesity (BMI 30-39.9)    Past Surgical History:  No past surgical history on file.     Allergies:  Eplerenone and Amiodarone     Social History:  Social History     Tobacco Use    Smoking status: Former     Packs/day: 0.50     Years: 10.00     Pack years: 5.00     Types: Cigarettes     Quit date:      Years since quittin.6    Smokeless tobacco: Never   Vaping Use    Vaping Use: Former    Quit date: 2021   Substance Use Topics    Alcohol use: Not Currently    Drug use: Not Currently        Family History:   family history is not on file.      PHYSICAL EXAM:   Vital signs: BP (!) 98/64 (BP Location: Left arm, Patient Position: Sitting, BP Cuff Size: Small adult)   Pulse 93   Resp 16   Ht 1.829 m (6')   Wt 110 kg (243 lb)   SpO2 93%   BMI 32.96 kg/m²   GENERAL: Well-developed, well-nourished in no apparent distress.   EYE:  No ocular asymmetry, PERRLA  HENT: Pink, moist mucous membranes.    NECK: No thyromegaly.   CARDIOVASCULAR:  No murmurs  LUNGS: Clear breath sounds  ABDOMEN: Soft, nontender   EXTREMITIES: No clubbing, cyanosis, or edema.   NEUROLOGICAL: No gross focal motor abnormalities   LYMPH: No cervical adenopathy palpated.   SKIN: No rashes, lesions.       Labs:  Lab Results    Component Value Date/Time    SODIUM 139 08/15/2022 03:44 PM    POTASSIUM 3.9 08/15/2022 03:44 PM    CHLORIDE 102 08/15/2022 03:44 PM    CO2 26 08/15/2022 03:44 PM    ANION 11.0 08/15/2022 03:44 PM    GLUCOSE 118 (H) 08/15/2022 03:44 PM    BUN 10 08/15/2022 03:44 PM    CREATININE 0.78 08/15/2022 03:44 PM    CALCIUM 9.0 08/15/2022 03:44 PM    ASTSGOT 15 08/15/2022 03:44 PM    ALTSGPT 12 08/15/2022 03:44 PM    TBILIRUBIN 0.3 08/15/2022 03:44 PM    ALBUMIN 4.2 08/15/2022 03:44 PM    TOTPROTEIN 7.2 08/15/2022 03:44 PM    GLOBULIN 3.0 08/15/2022 03:44 PM    AGRATIO 1.4 08/15/2022 03:44 PM       Lab Results   Component Value Date/Time    SODIUM 139 03/28/2022 1332    POTASSIUM 4.3 03/28/2022 1332    CHLORIDE 102 03/28/2022 1332    CO2 25 03/28/2022 1332    GLUCOSE 95 03/28/2022 1332    BUN 13 03/28/2022 1332    CREATININE 0.82 03/28/2022 1332    CALCIUM 8.7 03/28/2022 1332    ANION 12.0 03/28/2022 1332       No results found for: CHOLSTRLTOT, TRIGLYCERIDE, HDL, LDL, CHOLHDLRAT, NONHDL    Lab Results   Component Value Date/Time    TSHULTRASEN 0.330 (L) 03/28/2022 1332     Lab Results   Component Value Date/Time    FREET4 1.28 03/28/2022 1332     Lab Results   Component Value Date/Time    FREET3 3.76 03/15/2021 0710     No results found for: THYSTIMIG    Lab Results   Component Value Date/Time    MICROSOMALA 11.0 (H) 12/02/2021 0918         Imaging:       3/28/2022 9:05 AM     HISTORY/REASON FOR EXAM: History of hypothyroidism        TECHNIQUE/EXAM DESCRIPTION:  Ultrasound of the soft tissues of the head and neck.     COMPARISON:  None     FINDINGS:  The thyroid gland is homogeneous.  Vascularity is normal.     The right lobe of the thyroid gland measures 5.23 cm x 1.35 cm x 2.59 cm cm.  The left lobe of the thyroid gland measures 5.33 cm x 1.63 cm x 2.17 cm cm.  The isthmus measures 0.16 cm cm.     Nodules >= 1cm:        Nodule #1  There is a(n) hypoechoic wider than tall solid nodule with ill-defined margins and no  echogenic foci measuring 1.37 x 0.76 x 1.28 cm located on the left lower lobe.     There are no suspicious lateral neck node seen     IMPRESSION:     Homogenous thyroid gland with dominant: intermediate suspicion hypoechoic solid nodule measuring 1.37 cm located on the left lower lobe.     BRITTNEY Recommendations  Schedule for biopsy in the office  Observation - repeat US in 6 to 12 months in the office.           US report completed and dictated by  Luther Mercado MD, ALEX, ECNU        ASSESSMENT/PLAN:     1. Subclinical hypothyroidism  Unstable  TSH is suppressed   Adjust Synthroid to 50mcg MON to Sat and 1/2 pill on Sunday  Repeat thyroid labs in 6 months    2. Hashimoto's thyroiditis  Patient has slightly elevated TPO antibodies and a family history of autoimmune thyroid disease  This is contributory to his thyroid dysfunction aside from amiodarone therapy    3. Left thyroid nodule  Intermediate risk deeply hypoechoic nodule with irregular margins seen on left lower lobe  Biopsy was scheduled but he cancelled  Will reorder biopsy with [facility] radiology  I am repeating ultrasound of his thyroid nodule in the office in 6 months      Return in about 6 months (around 2/28/2023).      Thank you kindly for allowing me to participate in the thyroid care plan for this patient.    Luther Mercado MD, ALEX, ECNU  06/01/22    CC:   Ean Macario M.D.

## 2022-09-02 DIAGNOSIS — E03.8 SUBCLINICAL HYPOTHYROIDISM: ICD-10-CM

## 2022-09-02 RX ORDER — LEVOTHYROXINE SODIUM 50 MCG
TABLET ORAL
Qty: 30 TABLET | Refills: 5 | Status: SHIPPED | OUTPATIENT
Start: 2022-09-02 | End: 2023-05-22

## 2022-09-14 ENCOUNTER — HOSPITAL ENCOUNTER (OUTPATIENT)
Dept: RADIOLOGY | Facility: MEDICAL CENTER | Age: 52
End: 2022-09-14
Attending: INTERNAL MEDICINE
Payer: COMMERCIAL

## 2022-09-14 LAB — PATHOLOGY CONSULT NOTE: NORMAL

## 2022-09-14 PROCEDURE — 10005 FNA BX W/US GDN 1ST LES: CPT

## 2022-09-14 PROCEDURE — 88173 CYTOPATH EVAL FNA REPORT: CPT

## 2022-09-14 PROCEDURE — 700101 HCHG RX REV CODE 250

## 2022-09-14 RX ORDER — LIDOCAINE HYDROCHLORIDE 10 MG/ML
INJECTION, SOLUTION INFILTRATION; PERINEURAL
Status: DISCONTINUED
Start: 2022-09-14 | End: 2022-09-14

## 2022-09-14 RX ADMIN — LIDOCAINE HYDROCHLORIDE: 10 INJECTION, SOLUTION INFILTRATION; PERINEURAL at 08:10

## 2022-09-14 NOTE — PROGRESS NOTES
Outpatient Interventional Radiology RN Note:    US-guided fine needle aspiration of Left thyroid nodule completed by Dr. Gao; Procedure explained by MD prior to start and consent obtained, all questions/concerns addressed; Site marked and visualized; No procedural sedation required.    Procedure completed via Left anterior neck; 1 jar of cytolyt ; 1 Afirma obtained and sent to pathology for analysis; Puncture site covered with bandaid upon completion.    Patient tolerated the procedure well; Provided with appropriate discharge education, all questions/concerns addressed; Patient discharged home.

## 2022-09-22 ENCOUNTER — NON-PROVIDER VISIT (OUTPATIENT)
Dept: CARDIOLOGY | Facility: MEDICAL CENTER | Age: 52
End: 2022-09-22
Payer: COMMERCIAL

## 2022-09-22 PROCEDURE — 93295 DEV INTERROG REMOTE 1/2/MLT: CPT | Performed by: INTERNAL MEDICINE

## 2022-09-22 NOTE — CARDIAC REMOTE MONITOR - SCAN
Device transmission reviewed. Device demonstrated appropriate function.       Electronically Signed by: Angel Chandra M.D.    9/23/2022  6:44 PM

## 2022-09-27 ENCOUNTER — PRE-ADMISSION TESTING (OUTPATIENT)
Dept: ADMISSIONS | Facility: MEDICAL CENTER | Age: 52
End: 2022-09-27
Attending: INTERNAL MEDICINE
Payer: COMMERCIAL

## 2022-09-27 DIAGNOSIS — Z01.810 PRE-OPERATIVE CARDIOVASCULAR EXAMINATION: ICD-10-CM

## 2022-09-27 DIAGNOSIS — Z01.812 PRE-OPERATIVE LABORATORY EXAMINATION: ICD-10-CM

## 2022-09-27 LAB
ALBUMIN SERPL BCP-MCNC: 4 G/DL (ref 3.2–4.9)
ALBUMIN/GLOB SERPL: 1.4 G/DL
ALP SERPL-CCNC: 100 U/L (ref 30–99)
ALT SERPL-CCNC: 11 U/L (ref 2–50)
ANION GAP SERPL CALC-SCNC: 8 MMOL/L (ref 7–16)
AST SERPL-CCNC: 16 U/L (ref 12–45)
BILIRUB SERPL-MCNC: 0.2 MG/DL (ref 0.1–1.5)
BUN SERPL-MCNC: 12 MG/DL (ref 8–22)
CALCIUM SERPL-MCNC: 8.6 MG/DL (ref 8.4–10.2)
CHLORIDE SERPL-SCNC: 106 MMOL/L (ref 96–112)
CO2 SERPL-SCNC: 26 MMOL/L (ref 20–33)
CREAT SERPL-MCNC: 0.71 MG/DL (ref 0.5–1.4)
EKG IMPRESSION: NORMAL
ERYTHROCYTE [DISTWIDTH] IN BLOOD BY AUTOMATED COUNT: 51.3 FL (ref 35.9–50)
GFR SERPLBLD CREATININE-BSD FMLA CKD-EPI: 112 ML/MIN/1.73 M 2
GLOBULIN SER CALC-MCNC: 2.8 G/DL (ref 1.9–3.5)
GLUCOSE SERPL-MCNC: 104 MG/DL (ref 65–99)
HCT VFR BLD AUTO: 39.6 % (ref 42–52)
HGB BLD-MCNC: 13 G/DL (ref 14–18)
MCH RBC QN AUTO: 29.2 PG (ref 27–33)
MCHC RBC AUTO-ENTMCNC: 32.8 G/DL (ref 33.7–35.3)
MCV RBC AUTO: 89 FL (ref 81.4–97.8)
PLATELET # BLD AUTO: 235 K/UL (ref 164–446)
PMV BLD AUTO: 9.4 FL (ref 9–12.9)
POTASSIUM SERPL-SCNC: 4.5 MMOL/L (ref 3.6–5.5)
PROT SERPL-MCNC: 6.8 G/DL (ref 6–8.2)
RBC # BLD AUTO: 4.45 M/UL (ref 4.7–6.1)
SODIUM SERPL-SCNC: 140 MMOL/L (ref 135–145)
WBC # BLD AUTO: 7.1 K/UL (ref 4.8–10.8)

## 2022-09-27 PROCEDURE — 93010 ELECTROCARDIOGRAM REPORT: CPT | Performed by: INTERNAL MEDICINE

## 2022-09-27 PROCEDURE — 85027 COMPLETE CBC AUTOMATED: CPT

## 2022-09-27 PROCEDURE — 93005 ELECTROCARDIOGRAM TRACING: CPT

## 2022-09-27 PROCEDURE — 80053 COMPREHEN METABOLIC PANEL: CPT

## 2022-09-27 PROCEDURE — 36415 COLL VENOUS BLD VENIPUNCTURE: CPT

## 2022-09-27 ASSESSMENT — FIBROSIS 4 INDEX: FIB4 SCORE: 0.79

## 2022-09-28 NOTE — PREPROCEDURE INSTRUCTIONS
Pre admit apt: Pt. Instructed to continue regularly prescribed medications through day before surgery.  Instructed to take the following medications, the day of surgery, with a sip of water per anesthesia protocol: metoprolol, synthroid    METS greater than 4  Pt states no previous issues with anesthesia  Pt has AICD/Pacemaker    Dr. Gonzalez notied regarding patient's cardiac history.  Pt had a heart attack , in 6/21, dx with CHF, had pacemaker/AICD placed in 6/21.  Pt has sleep apnea uses CPAP, had a craniotomy in 8/21, after falling and hitting his head, seizure x 1.

## 2022-09-28 NOTE — OR NURSING
"Please obtain the last cardiology clinic note as well as the last echocardiogram in addition to AICD information. Otherwise nothing further to add at this time. Thank you  Dayna Gonzalez M.D.        On Sep 27, 2022, at 17:36, Ghada Soto [email or web address] wrote:  ?   Avi Gonzalez,     Dr. Gonzalez notied regarding patient's cardiac history.  Pt had a heart attack , in 6/21, dx with CHF, had pacemaker/AICD placed in 6/21.  Pt has sleep apnea uses CPAP, had a craniotomy in 8/21, after falling and hitting his head, seizure x 1.  Anesthesia Summary Report           Patient Name: Ross Blake \"Ras\" MRN: [id number] Admission Date: Patient not admitted         9/28 Spoke with Philipp, pacer nurse at [facility] cardiology.  She will fill out pacer form and send it back to us, in the next couple of days.  "

## 2022-10-06 ENCOUNTER — OFFICE VISIT (OUTPATIENT)
Dept: ENDOCRINOLOGY | Facility: MEDICAL CENTER | Age: 52
End: 2022-10-06
Attending: INTERNAL MEDICINE
Payer: COMMERCIAL

## 2022-10-06 VITALS
BODY MASS INDEX: 33.18 KG/M2 | OXYGEN SATURATION: 97 % | HEIGHT: 72 IN | WEIGHT: 245 LBS | HEART RATE: 96 BPM | DIASTOLIC BLOOD PRESSURE: 76 MMHG | SYSTOLIC BLOOD PRESSURE: 114 MMHG

## 2022-10-06 DIAGNOSIS — E06.3 HASHIMOTO'S THYROIDITIS: ICD-10-CM

## 2022-10-06 DIAGNOSIS — E03.8 SUBCLINICAL HYPOTHYROIDISM: ICD-10-CM

## 2022-10-06 DIAGNOSIS — E04.1 LEFT THYROID NODULE: ICD-10-CM

## 2022-10-06 PROCEDURE — 99211 OFF/OP EST MAY X REQ PHY/QHP: CPT | Performed by: INTERNAL MEDICINE

## 2022-10-06 PROCEDURE — 99214 OFFICE O/P EST MOD 30 MIN: CPT | Performed by: INTERNAL MEDICINE

## 2022-10-06 ASSESSMENT — FIBROSIS 4 INDEX: FIB4 SCORE: 1.03

## 2022-10-06 NOTE — PROGRESS NOTES
Chief Complaint: Follow up for Primary Hypothyroidism possibly related to amiodarone with a background of mild Hashimoto's thyroiditis based on elevated TPO antibodies.  He also has an intermediate suspicion hypoechoic solid nodule on the left lobe    HPI:     Ross Blake is a 52 y.o. male here for follow up of the above medical issues    Comorbid issues include alcohol related cardiomyopathy with acute systolic heart failure now improved status post ICD placement, obesity, sleep apnea, history of amiodarone therapy.    He had an elevated TSH of 9.6 on November 15, 2021  He has a family history of hypothyroidism  w/ his mom, maternal grandmother, maternal grandfather and cousins  Baseline labs showed slightly elevated TPO antibodies of 11    He also previously reported low testosterone levels on March 15, 2021 but on retesting his repeat testosterone levels were greater than 600 on December 2021          Since last office visit we found that his TSH was low at 0.08 on August 2022 (while on Synthroid 50 MCG daily)  We then adjusted his Synthroid  to 50 every day with 1/2 pill 1 day a week  He is supposed to get labs done in 2 to 3 months post dose change      However I am seeing him today to discuss the results of his biopsy for the dominant hypoechoic intermediate suspicion nodule with irregular margins measuring 1.37 cm located on the left lower lobe.    He was originally scheduled for a biopsy of this nodule in the office in August 2022 but he canceled his appointment  I scheduled him for biopsy of this nodule with radiology   which was completed on September 14, 2022  Unfortunately his biopsy results came back indeterminate on cytology and Afirma molecular analysis was done which came back as suspicious for malignancy with risk of malignancy of 50%    I discussed the biopsy results with him today          Patient's medications, allergies, and social histories were reviewed and updated as  appropriate.      ROS:     CONS:     No fever, no chills   EYES:     No diplopia, no blurry vision   CV:           No chest pain, no palpitations   PULM:     No SOB, no cough, no hemoptysis.   GI:            No nausea, no vomiting, no diarrhea, no constipation   ENDO:     No polyuria, no polydipsia, no heat intolerance, no cold intolerance       Past Medical History:  Problem List:  2022: Left thyroid nodule  2021: Subclinical hypothyroidism  2021: High risk medication use  2021: ICD (implantable cardioverter-defibrillator), dual, in situ   Medtronic Chrome placed 21: Electrolyte imbalance  2021: Ventricular tachyarrhythmia  2021: MANSOOR (obstructive sleep apnea)  2021: Alcohol withdrawal ([facility])  2021: Ventricular fibrillation ([facility])  2021: Prolonged Q-T interval on ECG  2021: Acute systolic heart failure ([facility])  2021: A-fib ([facility])  2021: Acute respiratory failure with hypoxia ([facility])  2018: Obesity (BMI 30-39.9)    Past Surgical History:  Past Surgical History:   Procedure Laterality Date    OTHER  2022    Throid biopsy    CRANIOTOMY  2021    Pt fell    PACEMAKER INSERTION  2021    Pacemaker/AICD placed    VEIN STRIPPING Bilateral 1992    vein ablation x2 ,         Allergies:  Eplerenone and Amiodarone     Social History:  Social History     Tobacco Use    Smoking status: Former     Packs/day: 0.50     Years: 10.00     Pack years: 5.00     Types: Cigarettes     Quit date:      Years since quittin.7    Smokeless tobacco: Never   Vaping Use    Vaping Use: Former    Quit date: 2021   Substance Use Topics    Alcohol use: Not Currently    Drug use: Not Currently        Family History:   family history is not on file.      PHYSICAL EXAM:   Vital signs: /76 (BP Location: Left arm, Patient Position: Sitting, BP Cuff Size: Adult)   Pulse 96   Ht 1.829 m (6')   Wt 111 kg (245 lb)   SpO2 97%   BMI 33.23 kg/m²   GENERAL: Well-developed,  well-nourished in no apparent distress.   EYE:  No ocular asymmetry, PERRLA  HENT: Pink, moist mucous membranes.    NECK: No thyromegaly.   CARDIOVASCULAR:  No murmurs  LUNGS: Clear breath sounds  ABDOMEN: Soft, nontender   EXTREMITIES: No clubbing, cyanosis, or edema.   NEUROLOGICAL: No gross focal motor abnormalities   LYMPH: No cervical adenopathy palpated.   SKIN: No rashes, lesions.       Labs:  Lab Results   Component Value Date/Time    SODIUM 140 09/27/2022 05:45 PM    POTASSIUM 4.5 09/27/2022 05:45 PM    CHLORIDE 106 09/27/2022 05:45 PM    CO2 26 09/27/2022 05:45 PM    ANION 8.0 09/27/2022 05:45 PM    GLUCOSE 104 (H) 09/27/2022 05:45 PM    BUN 12 09/27/2022 05:45 PM    CREATININE 0.71 09/27/2022 05:45 PM    CALCIUM 8.6 09/27/2022 05:45 PM    ASTSGOT 16 09/27/2022 05:45 PM    ALTSGPT 11 09/27/2022 05:45 PM    TBILIRUBIN 0.2 09/27/2022 05:45 PM    ALBUMIN 4.0 09/27/2022 05:45 PM    TOTPROTEIN 6.8 09/27/2022 05:45 PM    GLOBULIN 2.8 09/27/2022 05:45 PM    AGRATIO 1.4 09/27/2022 05:45 PM       Lab Results   Component Value Date/Time    SODIUM 139 03/28/2022 1332    POTASSIUM 4.3 03/28/2022 1332    CHLORIDE 102 03/28/2022 1332    CO2 25 03/28/2022 1332    GLUCOSE 95 03/28/2022 1332    BUN 13 03/28/2022 1332    CREATININE 0.82 03/28/2022 1332    CALCIUM 8.7 03/28/2022 1332    ANION 12.0 03/28/2022 1332       No results found for: CHOLSTRLTOT, TRIGLYCERIDE, HDL, LDL, CHOLHDLRAT, NONHDL    Lab Results   Component Value Date/Time    TSHULTRASEN 0.330 (L) 03/28/2022 1332     Lab Results   Component Value Date/Time    FREET4 1.28 03/28/2022 1332     Lab Results   Component Value Date/Time    FREET3 3.76 03/15/2021 0710     No results found for: THYSTIMIG    Lab Results   Component Value Date/Time    MICROSOMALA 11.0 (H) 12/02/2021 0918         [facility]                           DEPARTMENT OF PATHOLOGY                    [address] [place] [address]  [id number]               Phone [phone]           Fax [phone]   Jean-Pierre Santamaria M.D.     Elisabeth Franklin M.D.     Carlos Hsu M.D.                             Mitzi Castanon M.D.     Megan Barraza D.O.     JEANCARLOS Matt M.D.     Patient:    JAY BARRY          Specimen    [id number]                                            #:       Copies to:                         SURGICAL PATHOLOGY CONSULTATION                                    ** ADDENDUM **                             (SEE SUPPLEMENT A)   ADDENDUM:     This case is being addended to report the results of Afirma testing,   performed by [facility].     Nodule- A Thyroid, Lower Left, 1.37cm   Afirma Genomic Sequencing - Suspicious (Risk of Malignancy   50%)     Please see separate [facility] report for further details.     Addendum Signed By: Jean-Pierre Santamaria MD   Addendum Date: 09/27/2022   Original Report Date: 09/15/2022     FINAL DIAGNOSIS:     A. Left thyroid nodule fine needle aspiration:          [place] Category: III, atypia of undetermined significance.          The ThinPrep slide demonstrates numerous groups of thyroid           follicular epithelial cells. Multiple follicular epithelial           cell groups demonstrate hurthle-oid change with mild nuclear           crowding and pleomorphism. There are also increased small           lymphocytes noted suggestive for chronic thyroiditis.          No unequivocal cytologic features of papillary carcinoma are           seen.          See comment.     Comment: The atypia of undetermined significance category has an   implied risk of malignancy of approximately 5-15%. The ThinPrep slide   is reviewed with Dr. Hsu and Dr. Barraza with agreement on the   interpretation. The [facility] tube was sent for Afirma genomic testing.   The results are reported in an addendum report.                                             Diagnosis performed by:                                       JEAN-PIERRE SANTAMARIA MD  "  ------------------------------------------------------------------------   --   CODES: 2058x1     SPECIMEN(S)   A. Left thyroid nodule fine needle aspiration:     PREOPERATIVE DIAGNOSIS:   Left thyroid nodule     POSTOPERATIVE DIAGNOSIS:   None stated           GROSS DESCRIPTION:   A. Received labeled \"Jay Barry\" \"Left thyroid\" is approximately 30   mL of pink transparent fluid with cytolyt fixative previously added.   After concentration, one slide is prepared via ThinPrep for   Papanicolaou stain. No cell button is processed.     Also received is a [facility] tube that is placed in -70 degree freezer   and subsequently sent for testing to [facility]     MICROSCOPIC DESCRIPTION:   Microscopic examination was performed.  Please see diagnosis.           Report electronically signed by:   JEAN-PIERRE MENDOZA MD   Diagnosis performed at:  [facility]  Pathology   Department, [address] [place] [address]  [id number]       Printed 00/00/0000 [id number] JAY BARRY    Page 1 of 1 MRN#:[id number]      Specimen Collected: 09/14/22  8:10 AM Last Resulted: 09/27/22 11:34 AM                  Imaging:       3/28/2022 9:05 AM     HISTORY/REASON FOR EXAM: History of hypothyroidism        TECHNIQUE/EXAM DESCRIPTION:  Ultrasound of the soft tissues of the head and neck.     COMPARISON:  None     FINDINGS:  The thyroid gland is homogeneous.  Vascularity is normal.     The right lobe of the thyroid gland measures 5.23 cm x 1.35 cm x 2.59 cm cm.  The left lobe of the thyroid gland measures 5.33 cm x 1.63 cm x 2.17 cm cm.  The isthmus measures 0.16 cm cm.     Nodules >= 1cm:        Nodule #1  There is a(n) hypoechoic wider than tall solid nodule with ill-defined margins and no echogenic foci measuring 1.37 x 0.76 x 1.28 cm located on the left lower lobe.     There are no suspicious lateral neck node seen     IMPRESSION:     Homogenous thyroid gland with dominant: intermediate suspicion hypoechoic solid nodule measuring 1.37 " cm located on the left lower lobe.     BRITTNEY Recommendations  Schedule for biopsy in the office  Observation - repeat US in 6 to 12 months in the office.           US report completed and dictated by  Luther Mercado MD, FACE, ECNU        ASSESSMENT/PLAN:     1. Left thyroid nodule  Unstable  Reviewed biopsy results discussed options such as observation of the nodule, surgery with diagnostic left lobectomy and repeat biopsy in a couple of months  At this time he is undecided  He is going to talk to his wife and call me back with his decision  I recommended he keep his appointment in February for monitoring of his thyroid nodule    2. Subclinical hypothyroidism  Stable  TSH was slightly suppressed last visit  We already adjusted his medication to Synthroid 50 Monday to Saturday 1/2 pill on Sunday  He will repeat his thyroid labs next month and I will call him    3. Hashimoto's thyroiditis  This is the etiology of his hypothyroidism  Patient has slightly elevated TPO antibodies and a family history of autoimmune thyroid disease  This is contributory to his thyroid dysfunction aside from amiodarone therapy      Return in about 4 months (around 2/6/2023).      Thank you kindly for allowing me to participate in the thyroid care plan for this patient.    ALEX Almaraz MD, ECNU  06/01/22    CC:   Ean Macario M.D.

## 2022-10-11 ENCOUNTER — TELEPHONE (OUTPATIENT)
Dept: ENDOCRINOLOGY | Facility: MEDICAL CENTER | Age: 52
End: 2022-10-11
Payer: COMMERCIAL

## 2022-10-11 DIAGNOSIS — E04.1 LEFT THYROID NODULE: ICD-10-CM

## 2022-10-11 NOTE — TELEPHONE ENCOUNTER
VOICEMAIL  1. Caller Name: Ross Blake                        Call Back Number: [phone] (home)      2. Message: Patient called stating that he is having his biopsy done with the imaging department but that the order is incorrect and they are unable to schedule it for him. Please write up a new order so patient is able to schedule.    Thank you.     3. Patient approves office to leave a detailed voicemail/MyChart message: yes

## 2022-10-17 ENCOUNTER — ANESTHESIA (OUTPATIENT)
Dept: SURGERY | Facility: MEDICAL CENTER | Age: 52
End: 2022-10-17
Payer: COMMERCIAL

## 2022-10-17 ENCOUNTER — ANESTHESIA EVENT (OUTPATIENT)
Dept: SURGERY | Facility: MEDICAL CENTER | Age: 52
End: 2022-10-17
Payer: COMMERCIAL

## 2022-10-17 ENCOUNTER — HOSPITAL ENCOUNTER (OUTPATIENT)
Facility: MEDICAL CENTER | Age: 52
End: 2022-10-17
Attending: INTERNAL MEDICINE | Admitting: INTERNAL MEDICINE
Payer: COMMERCIAL

## 2022-10-17 VITALS
HEART RATE: 80 BPM | WEIGHT: 238.54 LBS | DIASTOLIC BLOOD PRESSURE: 82 MMHG | TEMPERATURE: 97.5 F | HEIGHT: 72 IN | SYSTOLIC BLOOD PRESSURE: 128 MMHG | OXYGEN SATURATION: 91 % | BODY MASS INDEX: 32.31 KG/M2 | RESPIRATION RATE: 16 BRPM

## 2022-10-17 PROCEDURE — 160048 HCHG OR STATISTICAL LEVEL 1-5: Performed by: INTERNAL MEDICINE

## 2022-10-17 PROCEDURE — 160025 RECOVERY II MINUTES (STATS): Performed by: INTERNAL MEDICINE

## 2022-10-17 PROCEDURE — 00731 ANES UPR GI NDSC PX NOS: CPT | Performed by: ANESTHESIOLOGY

## 2022-10-17 PROCEDURE — 160009 HCHG ANES TIME/MIN: Performed by: INTERNAL MEDICINE

## 2022-10-17 PROCEDURE — 160046 HCHG PACU - 1ST 60 MINS PHASE II: Performed by: INTERNAL MEDICINE

## 2022-10-17 PROCEDURE — 700105 HCHG RX REV CODE 258: Performed by: ANESTHESIOLOGY

## 2022-10-17 PROCEDURE — 160203 HCHG ENDO MINUTES - 1ST 30 MINS LEVEL 4: Performed by: INTERNAL MEDICINE

## 2022-10-17 PROCEDURE — 160035 HCHG PACU - 1ST 60 MINS PHASE I: Performed by: INTERNAL MEDICINE

## 2022-10-17 PROCEDURE — 88305 TISSUE EXAM BY PATHOLOGIST: CPT

## 2022-10-17 PROCEDURE — 88312 SPECIAL STAINS GROUP 1: CPT

## 2022-10-17 PROCEDURE — 700105 HCHG RX REV CODE 258: Performed by: INTERNAL MEDICINE

## 2022-10-17 PROCEDURE — 160002 HCHG RECOVERY MINUTES (STAT): Performed by: INTERNAL MEDICINE

## 2022-10-17 PROCEDURE — 700101 HCHG RX REV CODE 250: Performed by: ANESTHESIOLOGY

## 2022-10-17 PROCEDURE — 700111 HCHG RX REV CODE 636 W/ 250 OVERRIDE (IP): Performed by: ANESTHESIOLOGY

## 2022-10-17 PROCEDURE — 160208 HCHG ENDO MINUTES - EA ADDL 1 MIN LEVEL 4: Performed by: INTERNAL MEDICINE

## 2022-10-17 RX ORDER — ONDANSETRON 2 MG/ML
INJECTION INTRAMUSCULAR; INTRAVENOUS PRN
Status: DISCONTINUED | OUTPATIENT
Start: 2022-10-17 | End: 2022-10-17 | Stop reason: HOSPADM

## 2022-10-17 RX ORDER — MEPERIDINE HYDROCHLORIDE 25 MG/ML
12.5 INJECTION INTRAMUSCULAR; INTRAVENOUS; SUBCUTANEOUS
Status: DISCONTINUED | OUTPATIENT
Start: 2022-10-17 | End: 2022-10-17 | Stop reason: HOSPADM

## 2022-10-17 RX ORDER — METOCLOPRAMIDE HYDROCHLORIDE 5 MG/ML
INJECTION INTRAMUSCULAR; INTRAVENOUS PRN
Status: DISCONTINUED | OUTPATIENT
Start: 2022-10-17 | End: 2022-10-17 | Stop reason: SURG

## 2022-10-17 RX ORDER — ONDANSETRON 2 MG/ML
4 INJECTION INTRAMUSCULAR; INTRAVENOUS
Status: DISCONTINUED | OUTPATIENT
Start: 2022-10-17 | End: 2022-10-17 | Stop reason: HOSPADM

## 2022-10-17 RX ORDER — PIPERACILLIN SODIUM, TAZOBACTAM SODIUM 3; .375 G/15ML; G/15ML
INJECTION, POWDER, LYOPHILIZED, FOR SOLUTION INTRAVENOUS PRN
Status: DISCONTINUED | OUTPATIENT
Start: 2022-10-17 | End: 2022-10-17 | Stop reason: SURG

## 2022-10-17 RX ORDER — DIPHENHYDRAMINE HYDROCHLORIDE 50 MG/ML
12.5 INJECTION INTRAMUSCULAR; INTRAVENOUS
Status: DISCONTINUED | OUTPATIENT
Start: 2022-10-17 | End: 2022-10-17 | Stop reason: HOSPADM

## 2022-10-17 RX ORDER — MIDAZOLAM HYDROCHLORIDE 1 MG/ML
1 INJECTION INTRAMUSCULAR; INTRAVENOUS
Status: DISCONTINUED | OUTPATIENT
Start: 2022-10-17 | End: 2022-10-17 | Stop reason: HOSPADM

## 2022-10-17 RX ORDER — ALBUTEROL SULFATE 2.5 MG/3ML
2.5 SOLUTION RESPIRATORY (INHALATION)
Status: DISCONTINUED | OUTPATIENT
Start: 2022-10-17 | End: 2022-10-17

## 2022-10-17 RX ORDER — HALOPERIDOL 5 MG/ML
1 INJECTION INTRAMUSCULAR
Status: DISCONTINUED | OUTPATIENT
Start: 2022-10-17 | End: 2022-10-17 | Stop reason: HOSPADM

## 2022-10-17 RX ORDER — OXYCODONE HCL 5 MG/5 ML
5 SOLUTION, ORAL ORAL
Status: DISCONTINUED | OUTPATIENT
Start: 2022-10-17 | End: 2022-10-17 | Stop reason: HOSPADM

## 2022-10-17 RX ORDER — OXYCODONE HCL 5 MG/5 ML
10 SOLUTION, ORAL ORAL
Status: DISCONTINUED | OUTPATIENT
Start: 2022-10-17 | End: 2022-10-17 | Stop reason: HOSPADM

## 2022-10-17 RX ORDER — DEXAMETHASONE SODIUM PHOSPHATE 4 MG/ML
INJECTION, SOLUTION INTRA-ARTICULAR; INTRALESIONAL; INTRAMUSCULAR; INTRAVENOUS; SOFT TISSUE PRN
Status: DISCONTINUED | OUTPATIENT
Start: 2022-10-17 | End: 2022-10-17 | Stop reason: SURG

## 2022-10-17 RX ORDER — SODIUM CHLORIDE, SODIUM LACTATE, POTASSIUM CHLORIDE, CALCIUM CHLORIDE 600; 310; 30; 20 MG/100ML; MG/100ML; MG/100ML; MG/100ML
INJECTION, SOLUTION INTRAVENOUS CONTINUOUS
Status: DISCONTINUED | OUTPATIENT
Start: 2022-10-17 | End: 2022-10-17 | Stop reason: HOSPADM

## 2022-10-17 RX ADMIN — ROCURONIUM BROMIDE 40 MCG/KG/MIN: 10 INJECTION, SOLUTION INTRAVENOUS at 10:15

## 2022-10-17 RX ADMIN — ONDANSETRON 4 MG: 2 INJECTION INTRAMUSCULAR; INTRAVENOUS at 10:13

## 2022-10-17 RX ADMIN — PIPERACILLIN SODIUM AND TAZOBACTAM SODIUM 3.38 G: 3; .375 INJECTION, POWDER, FOR SOLUTION INTRAVENOUS at 10:08

## 2022-10-17 RX ADMIN — PROPOFOL 150 MG: 10 INJECTION, EMULSION INTRAVENOUS at 10:13

## 2022-10-17 RX ADMIN — DEXAMETHASONE SODIUM PHOSPHATE 4 MG: 4 INJECTION, SOLUTION INTRA-ARTICULAR; INTRALESIONAL; INTRAMUSCULAR; INTRAVENOUS; SOFT TISSUE at 10:13

## 2022-10-17 RX ADMIN — SUGAMMADEX 200 MG: 100 INJECTION, SOLUTION INTRAVENOUS at 11:02

## 2022-10-17 RX ADMIN — SODIUM CHLORIDE, POTASSIUM CHLORIDE, SODIUM LACTATE AND CALCIUM CHLORIDE: 600; 310; 30; 20 INJECTION, SOLUTION INTRAVENOUS at 10:11

## 2022-10-17 RX ADMIN — METOCLOPRAMIDE 10 MG: 5 INJECTION, SOLUTION INTRAMUSCULAR; INTRAVENOUS at 10:13

## 2022-10-17 ASSESSMENT — PAIN SCALES - GENERAL: PAIN_LEVEL: 0

## 2022-10-17 ASSESSMENT — FIBROSIS 4 INDEX: FIB4 SCORE: 1.03

## 2022-10-17 ASSESSMENT — PAIN DESCRIPTION - PAIN TYPE: TYPE: SURGICAL PAIN

## 2022-10-17 NOTE — OR SURGEON
Esophagogastroduodenoscopy and endoscopic ultrasound operative note    PreOp Diagnosis: Large, symptomatic pancreatic cyst      PostOp Diagnosis: Same      Procedure(s):  UPPER RADIAL ENDOSCOPIC ULTRASOUND,  PANCREAS SYMPTOMATIC PSEUDOCYST WITH FINE NEEDLE ASPIRATION - Wound Class: Clean Contaminated  EGD, WITH ENDOSCOPIC US - Wound Class: Clean Contaminated  EGD, WITH ASPIRATION BIOPSY - Wound Class: Clean Contaminated    Surgeon(s):  Geoff Rodrigez M.D.    Anesthesiologist/Type of Anesthesia:  Anesthesiologist: Anna Rivers M.D./General    Surgical Staff:  Circulator: Oscar Tyler R.N.  Endoscopy Technician: Dimitri Parks    Specimens removed if any:  ID Type Source Tests Collected by Time Destination   A : antrum bx Tissue Gastric PATHOLOGY SPECIMEN Geoff Rodrigez M.D. 10/17/2022 10:38 AM    B : buccal Other Other PATHOLOGY SPECIMEN Geoff Rodrigez M.D. 10/17/2022 10:38 AM    C : interpace - tail of pancreas cyst Tissue Pancreas PATHOLOGY SPECIMEN Geoff Rodrigez M.D. 10/17/2022 10:38 AM    D : cytology - tail of pancreas cyst Tissue Pancreas PATHOLOGY SPECIMEN Geoff Rodrigez M.D. 10/17/2022 10:38 AM        Dr. Rodrigez  [facility]  [facility]  [phone]    EGD with: Biopsy    EUS with: FNA    Indication: Large symptomatic pancreatic cyst    Sedation: GA (Dr. GABRIELE Rivers)    Findings:   EGD    Esophagus     Unremarkable mucosa    Stomach     Mild extrinsic compression in proximal body     Unremarkable overlying mucosa     Mild gastric antrum erythema and edema biopsied    Duodenum     Unremarkable mucosa     EUS    Celiac axis     No adenopathy    Pancreas body     Mild nonshadowing hyperechoic stranding and foci      Otherwise unremarkable parenchyma    Pancreatic tail cyst     Well-defined, simple, anechoic without solid component     96.3 mm x 90.1 mm x 95.5 mm     FNA  19-gauge [employer] expected needle       770 mL of cola colored  thin fluid aspirated until collapsed    Pancreatic duct     Normal course and caliber in the body    Ampulla     Unremarkable    CBD     Normal course and caliber     No filling defects    Head of pancreas     Normal dorsal ventral transition     Heterogeneous with hyperechoic stranding and foci    Plan:  Follow-up cyst fluid analysis    Repeat imaging pending patient's symptoms    Would consider cyst gastrostomy in future if cyst recurs and is symptomatic      Procedure detail:    Prior to procedure, informed consent was obtained.  Risks, benefits, alternatives, including but not limited to risk of bleeding, infection, perforation, adverse reaction to sedating medicine, failure to identify pathology, pancreatitis and death were explained to the patient who accepted all risks.    Patient was prepped in the left lateral position after intubation and sedation was provided by anesthesia.    EGD  Scope tip of the Olympus flexible gastroscope was passed into the proximal esophagus.  Proximal middle and distal thirds of the esophagus were well visualized and were unremarkable.  The stomach was entered and the gastric pool was suctioned.  Air was insufflated.  Scope was retroflexed to view the body fundus and cardia.  The mucosa in the proximal stomach was unremarkable but there was extrinsic compression in the proximal body.  This was not major and did not significantly impact the gastric lumen.  The scope was straightened to view the antrum and incisura which showed mild erythema and edema.  Biopsies were taken for H. pylori.  The pylorus was patent.  Duodenal bulb, sweep, second and third portion were well visualized and were unremarkable.  The gastroscope was withdrawn and  we proceeded with endoscopic ultrasound.    EUS  The flexible radial echoendoscope was passed in the proximal stomach.  Imaging of the celiac axis was unremarkable with no adenopathy.  Imaging of the pancreatic body demonstrated a mostly  normal-appearing gland with mild nonshadowing hyperechoic stranding and foci.  The pancreatic tail was mostly obscured by a very large well defined round cyst.  This was simple and anechoic and there was no solid component.  It measured approximately 95.5 mm x 90.1 mm x 96.3 mm.  The surrounding area was unremarkable.  It was unclear whether or not there was any communication with the pancreatic duct.  The pancreatic duct in the body of the pancreas was otherwise normal course and caliber.  The scope was advanced in the duodenum the biliary ampulla was endosonographically unremarkable.  The common bile duct had a normal course and caliber with no filling defects.  The head of the pancreas itself had a normal dorsal ventral transition but was slightly more heterogeneous in the tail of the pancreas with nonshadowing hyperechoic stranding and foci.  The radial echoendoscope was withdrawn and we proceeded with fine-needle aspiration.  The flexible linear echoendoscope was passed into the proximal stomach.  The large tail of pancreas cyst was identified and surrounding anatomy was confirmed.  A 19-gauge [employer] expected needle was used to pass into the cyst.  This was done with a single pass.  Following this aspiration of cyst fluid was performed until the cyst was completely collapsed.  A total of 770 mL of cola colored thin fluid was aspirated.  After collapse of the cyst no solid component was appreciated.  Once this was complete the needle was withdrawn.  The procedure was deemed complete.  Air and liquid were suctioned.  Patient tolerated the procedure well and was sent to recovery without immediate complications.      10/17/2022 11:04 AM Geoff Rodrigez M.D.

## 2022-10-17 NOTE — OR NURSING
1108: Patient arrived to PACU from endo via [facility]. Report received from anesthesia and RN. Respirations are spontaneous and unlabored. VSS on 6L.     1117: Report to Mathew ELIZALDE.     1132: report received from Mathew ELIZALDE. Pt resting comfortably in [facility]. No c/o pain or nausea. VSS on RA    1208: criteria met for phase II. Report called to receiving RN.

## 2022-10-17 NOTE — OR NURSING
1117- Assumed care of patient from FIDE Villalpando. Patient alert, reports no abdominal pain or nausea. Patient reports mild sore throat, ice chips available PRN.  1123- Family updated via telephone.  1132- Report given to FIDE Villalpando.

## 2022-10-17 NOTE — ANESTHESIA POSTPROCEDURE EVALUATION
Patient: Ross Blake    Procedure Summary     Date: 10/17/22 Room / Location:  ENDOSCOPIC ULTRASOUND ROOM / SURGERY [facility]    Anesthesia Start: 1011 Anesthesia Stop: 1109    Procedures:       UPPER RADIAL ENDOSCOPIC ULTRASOUND,  PANCREAS SYMPTOMATIC PSEUDOCYST WITH FINE NEEDLE ASPIRATION      EGD, WITH ENDOSCOPIC US      EGD, WITH ASPIRATION BIOPSY Diagnosis:       Pancreatic pseudocyst      (PANCREATIC PSEUDOCYST)    Surgeons: Geoff Rodrigez M.D. Responsible Provider: Anna Rievrs M.D.    Anesthesia Type: general ASA Status: 3          Final Anesthesia Type: general  Last vitals  BP        Temp   36.2 °C (97.2 °F)    Pulse       Resp   16    SpO2   97 %      Anesthesia Post Evaluation    Patient location during evaluation: bedside  Patient participation: complete - patient participated  Level of consciousness: awake and alert  Pain score: 0    Anesthetic complications: no  Cardiovascular status: adequate  Respiratory status: acceptable  Hydration status: acceptable    PONV: none          There were no known notable events for this encounter.     Nurse Pain Score: 0 (NPRS)

## 2022-10-17 NOTE — OR NURSING
1217 Patient to Phase 2 from PACU after report received      Patient dressed with help from CNA     1230 Patient education completed, by Estefany patients wife  family denies further questions.    1234 DC'd to care of family post uneventful stay in PACU 2.

## 2022-10-17 NOTE — ANESTHESIA PROCEDURE NOTES
Airway    Date/Time: 10/17/2022 10:20 AM  Performed by: Anna Rivers M.D.  Authorized by: Anna Rivers M.D.     Location:  OR  Urgency:  Elective  Difficult Airway: No    Indications for Airway Management:  Anesthesia      Spontaneous Ventilation: absent    Sedation Level:  Deep  Preoxygenated: Yes    MILS Maintained Throughout: Yes    Mask Difficulty Assessment:  1 - vent by mask  Final Airway Type:  Endotracheal airway  Final Endotracheal Airway:  ETT  Cuffed: Yes    Technique Used for Successful ETT Placement:  Video laryngoscopy  Devices/Methods Used in Placement:  Intubating stylet    Insertion Site:  Oral  Blade Type:  Yonatan  Laryngoscope Blade/Videolaryngoscope Blade Size:  4  ETT Size (mm):  6.5  Measured from:  Lips  Placement Verified by: auscultation and capnometry    Cormack-Lehane Classification:  Grade I - full view of glottis  Number of Attempts at Approach:  1  Ventilation Between Attempts:  BVM  Number of Other Approaches Attempted:  1  Unsuccessful Approach(es) for ETT:  Direct laryngoscopy

## 2022-10-17 NOTE — DISCHARGE INSTRUCTIONS
If any questions arise, call your provider, Dr. Rodrigez [phone].  If your provider is not available, please feel free to call the [facility] at [phone].    MEDICATIONS: Resume taking daily medication.  Take prescribed pain medication with food.  If no medication is prescribed, you may take non-aspirin pain medication if needed.  PAIN MEDICATION CAN BE VERY CONSTIPATING.  Take a stool softener or laxative such as senokot, pericolace, or milk of magnesia if needed.    No pain medication given in recovery.     What to Expect Post Anesthesia    Rest and take it easy for the first 24 hours.  A responsible adult is recommended to remain with you during that time.  It is normal to feel sleepy.  We encourage you to not do anything that requires balance, judgment or coordination.    FOR 24 HOURS DO NOT:  Drive, operate machinery or run household appliances.  Drink beer or alcoholic beverages.  Make important decisions or sign legal documents.    To avoid nausea, slowly advance diet as tolerated, avoiding spicy or greasy foods for the first day.  Add more substantial food to your diet according to your provider's instructions.  Babies can be fed formula or breast milk as soon as they are hungry.  INCREASE FLUIDS AND FIBER TO AVOID CONSTIPATION.    MILD FLU-LIKE SYMPTOMS ARE NORMAL.  YOU MAY EXPERIENCE GENERALIZED MUSCLE ACHES, THROAT IRRITATION, HEADACHE AND/OR SOME NAUSEA.    Diet  Resume pre-operative diet upon discharge from the hospital. Begin with softer and lighter foods. Depending on how you are feeling and whether you have nausea or not, you may wish to stay with a bland diet for the first few days. However, you can advance your diet as quickly as you feel ready.    Activity  You may resume your normal activity as tolerated.  Rest as needed.        Dr. Rodrigez  [facility]  [facility]  [phone]     EGD with:      Biopsy     EUS with:       FNA     Indication:      Large symptomatic  pancreatic cyst     Sedation:       GA (Dr. GABRIELE Rivers)     Findings:             EGD                         Esophagus                                     Unremarkable mucosa                         Stomach                                     Mild extrinsic compression in proximal body                                     Unremarkable overlying mucosa                                     Mild gastric antrum erythema and edema biopsied                         Duodenum                                     Unremarkable mucosa                EUS                         Celiac axis                                     No adenopathy                         Pancreas body                                     Mild nonshadowing hyperechoic stranding and foci                                                 Otherwise unremarkable parenchyma                         Pancreatic tail cyst                                     Well-defined, simple, anechoic without solid component                                     96.3 mm x 90.1 mm x 95.5 mm                                     FNA  19-gauge Fantrotter expected needle                                                             770 mL of cola colored thin fluid aspirated until collapsed                         Pancreatic duct                                     Normal course and caliber in the body                         Ampulla                                     Unremarkable                         CBD                                     Normal course and caliber                                     No filling defects                         Head of pancreas                                     Normal dorsal ventral transition                                     Heterogeneous with hyperechoic stranding and foci     Plan:              Follow-up cyst fluid analysis                         Repeat imaging pending patient's symptoms                         Would consider cyst  gastrostomy in future if cyst recurs and is symptomatic

## 2022-10-17 NOTE — ANESTHESIA TIME REPORT
Anesthesia Start and Stop Event Times     Date Time Event    10/17/2022 0905 Ready for Procedure     1011 Anesthesia Start     1109 Anesthesia Stop        Responsible Staff  10/17/22    Name Role Begin End    Anna Rivers M.D. Anesth 1011 1109        Overtime Reason:  per dorothy, locums, etc.    Comments:

## 2022-10-25 ENCOUNTER — HOSPITAL ENCOUNTER (OUTPATIENT)
Dept: RADIOLOGY | Facility: MEDICAL CENTER | Age: 52
End: 2022-10-25
Attending: INTERNAL MEDICINE
Payer: COMMERCIAL

## 2022-10-25 LAB — PATHOLOGY CONSULT NOTE: NORMAL

## 2022-10-25 PROCEDURE — 88173 CYTOPATH EVAL FNA REPORT: CPT

## 2022-10-25 PROCEDURE — 10006 FNA BX W/US GDN EA ADDL: CPT

## 2022-10-25 NOTE — PROGRESS NOTES
US guided left thyroid nodule fine needle aspiration done by Dr. moore; NON-SEDATION (no H&P required as this is a NON SEDATION procedure) left anterior aspect of neck access site; 1 jar of cytolyt obtained and sent to pathology lab; pt tolerated the procedure well; pt hemodynamically stable pre/intra/post procedure; all questions and concerns answered prior to being d/c; patient provided with appropriate education for procedure; pt d/c home.

## 2022-10-26 ENCOUNTER — OFFICE VISIT (OUTPATIENT)
Dept: CARDIOLOGY | Facility: MEDICAL CENTER | Age: 52
End: 2022-10-26
Payer: COMMERCIAL

## 2022-10-26 VITALS
HEIGHT: 72 IN | DIASTOLIC BLOOD PRESSURE: 86 MMHG | BODY MASS INDEX: 33.46 KG/M2 | HEART RATE: 86 BPM | OXYGEN SATURATION: 97 % | WEIGHT: 247 LBS | SYSTOLIC BLOOD PRESSURE: 116 MMHG | RESPIRATION RATE: 16 BRPM

## 2022-10-26 DIAGNOSIS — I50.21 ACUTE SYSTOLIC HEART FAILURE (HCC): ICD-10-CM

## 2022-10-26 DIAGNOSIS — I49.01 VENTRICULAR FIBRILLATION (HCC): ICD-10-CM

## 2022-10-26 DIAGNOSIS — I48.0 PAROXYSMAL ATRIAL FIBRILLATION (HCC): ICD-10-CM

## 2022-10-26 DIAGNOSIS — R94.31 PROLONGED Q-T INTERVAL ON ECG: ICD-10-CM

## 2022-10-26 DIAGNOSIS — Z95.810 ICD (IMPLANTABLE CARDIOVERTER-DEFIBRILLATOR), DUAL, IN SITU: ICD-10-CM

## 2022-10-26 DIAGNOSIS — E03.8 SUBCLINICAL HYPOTHYROIDISM: ICD-10-CM

## 2022-10-26 DIAGNOSIS — G47.33 OSA (OBSTRUCTIVE SLEEP APNEA): ICD-10-CM

## 2022-10-26 DIAGNOSIS — I47.20 VENTRICULAR TACHYARRHYTHMIA (HCC): ICD-10-CM

## 2022-10-26 PROCEDURE — 99214 OFFICE O/P EST MOD 30 MIN: CPT | Performed by: INTERNAL MEDICINE

## 2022-10-26 ASSESSMENT — FIBROSIS 4 INDEX: FIB4 SCORE: 1.03

## 2022-10-26 ASSESSMENT — MINNESOTA LIVING WITH HEART FAILURE QUESTIONNAIRE (MLHF)
TIRED, FATIGUED OR LOW ON ENERGY: 0
WORKING AROUND THE HOUSE OR YARD DIFFICULT: 0
DIFFICULTY GOING AWAY FROM HOME: 0
SWELLING IN ANKLES OR LEGS: 1
DIFFICULTY SOCIALIZING WITH FAMILY OR FRIENDS: 0
DIFFICULTY TO CONCENTRATE OR REMEMBERING THINGS: 0
LOSS OF SELF CONTROL IN YOUR LIFE: 0
GIVING YOU SIDE EFFECTS FROM TREATMENTS: 0
MAKING YOU STAY IN A HOSPITAL: 0
DIFFICULTY SLEEPING WELL AT NIGHT: 0
WALKING ABOUT OR CLIMBING STAIRS DIFFICULT: 0
DIFFICULTY WITH RECREATIONAL PASTIMES, SPORTS, HOBBIES: 1
DIFFICULTY WITH SEXUAL ACTIVITIES: 1
EATING LESS FOODS YOU LIKE: 1
TOTAL_SCORE: 5
DIFFICULTY WORKING TO EARN A LIVING: 0
MAKING YOU WORRY: 0
HAVING TO SIT OR LIE DOWN DURING THE DAY: 0
MAKING YOU FEEL DEPRESSED: 0
FEELING LIKE A BURDEN TO FAMILY AND FRIENDS: 0
MAKING YOU SHORT OF BREATH: 0
COSTING YOU MONEY FOR MEDICAL CARE: 1

## 2022-10-27 ASSESSMENT — ENCOUNTER SYMPTOMS
FEVER: 0
NEUROLOGICAL NEGATIVE: 1
DIZZINESS: 0
WHEEZING: 0
PND: 0
GASTROINTESTINAL NEGATIVE: 1
ORTHOPNEA: 0
HEMOPTYSIS: 0
SHORTNESS OF BREATH: 0
SPUTUM PRODUCTION: 0
CHILLS: 0
SORE THROAT: 0
LOSS OF CONSCIOUSNESS: 0
CARDIOVASCULAR NEGATIVE: 1
WEAKNESS: 0
PALPITATIONS: 0
BRUISES/BLEEDS EASILY: 0
CONSTITUTIONAL NEGATIVE: 1
RESPIRATORY NEGATIVE: 1
CLAUDICATION: 0
COUGH: 0
MUSCULOSKELETAL NEGATIVE: 1
STRIDOR: 0
EYES NEGATIVE: 1

## 2022-10-27 NOTE — PROGRESS NOTES
Chief Complaint   Patient presents with    Atrial Fibrillation    CHF (Systolic)     F/V Dx: Acute systolic heart failure (HCC)         Subjective     Ras Blake is a 51 y.o. male who presents today as a new consultation for heart failure.  He is a 51-year-old male who had ventricular tachycardia arrest and subdural hematoma.  At the time he was drinking a pint of vodka or tequila per day.  His EF is normalized.      Since he was last seen he continues to do well.  He is worried about the metoprolol causing him some fatigue and weakness.  He said no chest pain.  He occasionally takes the Lasix for lower extremity edema.  His blood pressures running low normal.    Past Medical History:   Diagnosis Date    AICD/Pacemaker     6/21    Arrhythmia     RBBB    CHF (congestive heart failure) (HCC)     Disorder of thyroid     Hashimotos    Hypertension     Myocardial infarct (HCC) 06/2021    Seizure (HCC) 08/2021    Following craniotomy x1    Sleep apnea     cpap    Snoring      Past Surgical History:   Procedure Laterality Date    HI UPPER GI ENDOSCOPY,DIAGNOSIS N/A 10/17/2022    Procedure: UPPER RADIAL ENDOSCOPIC ULTRASOUND,  PANCREAS SYMPTOMATIC PSEUDOCYST WITH FINE NEEDLE ASPIRATION;  Surgeon: Geoff Rodrigez M.D.;  Location: [facility];  Service: EUS    EGD W/ENDOSCOPIC ULTRASOUND N/A 10/17/2022    Procedure: EGD, WITH ENDOSCOPIC US;  Surgeon: Geoff Rodrigez M.D.;  Location: [facility];  Service: EUS    EGD WITH ASP/BX  10/17/2022    Procedure: EGD, WITH ASPIRATION BIOPSY;  Surgeon: Geoff Rodrigez M.D.;  Location: [facility];  Service: EUS    OTHER  09/2022    Throid biopsy    CRANIOTOMY  08/2021    Pt fell    PACEMAKER INSERTION  07/2021    Pacemaker/AICD placed    VEIN STRIPPING Bilateral 1992    vein ablation x2 2003, 2018     History reviewed. No pertinent family history.  Social History     Socioeconomic History    Marital status:      Spouse name:  Not on file    Number of children: Not on file    Years of education: Not on file    Highest education level: Not on file   Occupational History    Not on file   Tobacco Use    Smoking status: Former     Packs/day: 0.50     Years: 10.00     Pack years: 5.00     Types: Cigarettes     Quit date:      Years since quittin.8    Smokeless tobacco: Never   Vaping Use    Vaping Use: Former    Quit date: 2021   Substance and Sexual Activity    Alcohol use: Not Currently    Drug use: Not Currently    Sexual activity: Not on file   Other Topics Concern    Not on file   Social History Narrative    Not on file     Social Determinants of Health     Financial Resource Strain: Not on file   Food Insecurity: Not on file   Transportation Needs: Not on file   Physical Activity: Not on file   Stress: Not on file   Social Connections: Not on file   Intimate Partner Violence: Not on file   Housing Stability: Not on file     Allergies   Allergen Reactions    Amiodarone      TSH elevated    Eplerenone      Gynecomastia     Outpatient Encounter Medications as of 10/26/2022   Medication Sig Dispense Refill    SYNTHROID 50 MCG Tab TAKE ONE TABLET BY MOUTH EVERY MORNING ON AN EMPTY STOMACH 30 Tablet 5    metoprolol SR (TOPROL XL) 25 MG TABLET SR 24 HR Take 1 Tablet by mouth every day. 90 Tablet 3    losartan (COZAAR) 25 MG Tab Take 1 Tablet by mouth every day. 90 Tablet 3    furosemide (LASIX) 40 MG Tab Take 1 Tablet by mouth 2 times a day as needed (for swelling). 60 Tablet 11    Ascorbic Acid (VITAMIN C GUMMIE PO) Take 1,000 mg by mouth every day.      multivitamin-iron-minerals-folic acid (CENTRUM) chewable tablet Chew 2 Tablets every day.       No facility-administered encounter medications on file as of 10/26/2022.     Review of Systems   Constitutional: Negative.  Negative for chills, fever and malaise/fatigue.   HENT: Negative.  Negative for sore throat.    Eyes: Negative.    Respiratory: Negative.  Negative for cough,  hemoptysis, sputum production, shortness of breath, wheezing and stridor.    Cardiovascular: Negative.  Negative for chest pain, palpitations, orthopnea, claudication, leg swelling and PND.   Gastrointestinal: Negative.    Genitourinary: Negative.    Musculoskeletal: Negative.    Skin: Negative.    Neurological: Negative.  Negative for dizziness, loss of consciousness and weakness.   Endo/Heme/Allergies: Negative.  Does not bruise/bleed easily.        Breast pain   All other systems reviewed and are negative.           Objective     /86 (BP Location: Left arm, Patient Position: Sitting, BP Cuff Size: Adult)   Pulse 86   Resp 16   Ht 1.829 m (6')   Wt 112 kg (247 lb)   SpO2 97%   BMI 33.50 kg/m²     Physical Exam  Vitals and nursing note reviewed.   Constitutional:       General: He is not in acute distress.     Appearance: He is well-developed. He is not diaphoretic.   HENT:      Head: Normocephalic and atraumatic.      Right Ear: External ear normal.      Left Ear: External ear normal.      Nose: Nose normal.      Mouth/Throat:      Pharynx: No oropharyngeal exudate.   Eyes:      General: No scleral icterus.        Right eye: No discharge.         Left eye: No discharge.      Conjunctiva/sclera: Conjunctivae normal.      Pupils: Pupils are equal, round, and reactive to light.   Neck:      Vascular: No JVD.   Cardiovascular:      Rate and Rhythm: Normal rate and regular rhythm.      Heart sounds: No murmur heard.    No friction rub. No gallop.   Pulmonary:      Effort: Pulmonary effort is normal. No respiratory distress.      Breath sounds: No stridor. No wheezing or rales.   Chest:      Chest wall: No tenderness.   Abdominal:      General: There is no distension.      Palpations: Abdomen is soft.      Tenderness: There is no guarding.   Musculoskeletal:         General: No tenderness or deformity. Normal range of motion.      Cervical back: Neck supple.   Skin:     General: Skin is warm and dry.       Coloration: Skin is not pale.      Findings: No erythema or rash.   Neurological:      Mental Status: He is alert.      Cranial Nerves: No cranial nerve deficit.      Motor: No abnormal muscle tone.      Coordination: Coordination normal.      Deep Tendon Reflexes: Reflexes are normal and symmetric. Reflexes normal.   Psychiatric:         Behavior: Behavior normal.         Thought Content: Thought content normal.         Judgment: Judgment normal.          Echocardiogram: Dated 9/17/2021 personally viewed under myself showing normal LV systolic function no valvular heart disease.    Echocardiogram: Dated 5/14/2021 personally reviewed and interpreted by myself showing an EF of 35% with no valvular heart disease.    Cardiac catheterization: Dated 5/17/2021 personally viewed inter myself showing no ischemic coronary disease.    Assessment & Plan     1. Acute systolic heart failure (HCC)        2. Paroxysmal atrial fibrillation (HCC)        3. Ventricular fibrillation (HCC)        4. Prolonged Q-T interval on ECG        5. Ventricular tachyarrhythmia        6. MANSOOR (obstructive sleep apnea)        7. ICD (implantable cardioverter-defibrillator), dual, in situ Medtronic Chrome placed 5/18/21        8. Subclinical hypothyroidism            Medical Decision Making: Today's Assessment/Status/Plan:        52-year-old male with heart failure with reduced ejection fraction alcohol abuse now alcohol free with a now normalized heart function.  I will have him stop his metoprolol to see if this improves his symptoms of fatigue.  He will continue to monitor his blood pressures.  I will continue to have him stay on the losartan and the Lasix.  We will see him back in 6 months.

## 2022-10-31 LAB — CYTOLOGY REG CYTOL: NORMAL

## 2022-11-09 ENCOUNTER — HOSPITAL ENCOUNTER (OUTPATIENT)
Dept: LAB | Facility: MEDICAL CENTER | Age: 52
End: 2022-11-09
Attending: INTERNAL MEDICINE
Payer: COMMERCIAL

## 2022-11-09 LAB
ALBUMIN SERPL BCP-MCNC: 4.3 G/DL (ref 3.2–4.9)
ALBUMIN/GLOB SERPL: 1.4 G/DL
ALP SERPL-CCNC: 101 U/L (ref 30–99)
ALT SERPL-CCNC: 10 U/L (ref 2–50)
ANION GAP SERPL CALC-SCNC: 12 MMOL/L (ref 7–16)
AST SERPL-CCNC: 14 U/L (ref 12–45)
BASOPHILS # BLD AUTO: 0.6 % (ref 0–1.8)
BASOPHILS # BLD: 0.04 K/UL (ref 0–0.12)
BILIRUB SERPL-MCNC: 0.2 MG/DL (ref 0.1–1.5)
BUN SERPL-MCNC: 13 MG/DL (ref 8–22)
CALCIUM SERPL-MCNC: 9.2 MG/DL (ref 8.5–10.5)
CHLORIDE SERPL-SCNC: 98 MMOL/L (ref 96–112)
CO2 SERPL-SCNC: 25 MMOL/L (ref 20–33)
CREAT SERPL-MCNC: 0.84 MG/DL (ref 0.5–1.4)
EOSINOPHIL # BLD AUTO: 0.15 K/UL (ref 0–0.51)
EOSINOPHIL NFR BLD: 2.3 % (ref 0–6.9)
ERYTHROCYTE [DISTWIDTH] IN BLOOD BY AUTOMATED COUNT: 48 FL (ref 35.9–50)
GFR SERPLBLD CREATININE-BSD FMLA CKD-EPI: 106 ML/MIN/1.73 M 2
GLOBULIN SER CALC-MCNC: 3.1 G/DL (ref 1.9–3.5)
GLUCOSE SERPL-MCNC: 100 MG/DL (ref 65–99)
HCT VFR BLD AUTO: 42.3 % (ref 42–52)
HGB BLD-MCNC: 13.6 G/DL (ref 14–18)
IMM GRANULOCYTES # BLD AUTO: 0.02 K/UL (ref 0–0.11)
IMM GRANULOCYTES NFR BLD AUTO: 0.3 % (ref 0–0.9)
LYMPHOCYTES # BLD AUTO: 1.24 K/UL (ref 1–4.8)
LYMPHOCYTES NFR BLD: 18.9 % (ref 22–41)
MCH RBC QN AUTO: 29.2 PG (ref 27–33)
MCHC RBC AUTO-ENTMCNC: 32.2 G/DL (ref 33.7–35.3)
MCV RBC AUTO: 90.8 FL (ref 81.4–97.8)
MONOCYTES # BLD AUTO: 0.54 K/UL (ref 0–0.85)
MONOCYTES NFR BLD AUTO: 8.2 % (ref 0–13.4)
NEUTROPHILS # BLD AUTO: 4.57 K/UL (ref 1.82–7.42)
NEUTROPHILS NFR BLD: 69.7 % (ref 44–72)
NRBC # BLD AUTO: 0 K/UL
NRBC BLD-RTO: 0 /100 WBC
PLATELET # BLD AUTO: 291 K/UL (ref 164–446)
PMV BLD AUTO: 9.7 FL (ref 9–12.9)
POTASSIUM SERPL-SCNC: 4 MMOL/L (ref 3.6–5.5)
PROT SERPL-MCNC: 7.4 G/DL (ref 6–8.2)
RBC # BLD AUTO: 4.66 M/UL (ref 4.7–6.1)
SODIUM SERPL-SCNC: 135 MMOL/L (ref 135–145)
WBC # BLD AUTO: 6.6 K/UL (ref 4.8–10.8)

## 2022-11-09 PROCEDURE — 36415 COLL VENOUS BLD VENIPUNCTURE: CPT

## 2022-11-09 PROCEDURE — 80053 COMPREHEN METABOLIC PANEL: CPT

## 2022-11-09 PROCEDURE — 85025 COMPLETE CBC W/AUTO DIFF WBC: CPT

## 2022-11-18 ENCOUNTER — HOSPITAL ENCOUNTER (OUTPATIENT)
Dept: RADIOLOGY | Facility: MEDICAL CENTER | Age: 52
End: 2022-11-18
Attending: INTERNAL MEDICINE
Payer: COMMERCIAL

## 2022-11-18 DIAGNOSIS — R10.12 LEFT UPPER QUADRANT PAIN: ICD-10-CM

## 2022-11-18 DIAGNOSIS — K86.3 PANCREATIC PSEUDOCYST: ICD-10-CM

## 2022-11-18 PROCEDURE — 74170 CT ABD WO CNTRST FLWD CNTRST: CPT

## 2022-11-18 PROCEDURE — 700117 HCHG RX CONTRAST REV CODE 255: Performed by: INTERNAL MEDICINE

## 2022-11-18 RX ADMIN — IOHEXOL 100 ML: 350 INJECTION, SOLUTION INTRAVENOUS at 09:06

## 2022-11-29 ENCOUNTER — PRE-ADMISSION TESTING (OUTPATIENT)
Dept: ADMISSIONS | Facility: MEDICAL CENTER | Age: 52
End: 2022-11-29
Attending: INTERNAL MEDICINE
Payer: COMMERCIAL

## 2022-11-29 NOTE — PREPROCEDURE INSTRUCTIONS
"Preadmit Phone appointment: \" Preparing for your Procedure information\" Instructions discussed with Patient.       Patient instructed to continue prescribed medications through the day before surgery, instructed to take the following medications the day of surgery per anesthesia protocol: synthroid        Pt states, \"no issues with anesthesia\".  Fasting guidelines discussed with Patient, patient will follow GI's instructions for Pre-Surgery Diet.      All Pt's questions and concerns answered or addressed.  Emailed all instructions.    "

## 2022-11-30 ENCOUNTER — ANESTHESIA EVENT (OUTPATIENT)
Dept: SURGERY | Facility: MEDICAL CENTER | Age: 52
End: 2022-11-30
Payer: COMMERCIAL

## 2022-11-30 ENCOUNTER — ANESTHESIA (OUTPATIENT)
Dept: SURGERY | Facility: MEDICAL CENTER | Age: 52
End: 2022-11-30
Payer: COMMERCIAL

## 2022-11-30 ENCOUNTER — HOSPITAL ENCOUNTER (OUTPATIENT)
Facility: MEDICAL CENTER | Age: 52
End: 2022-11-30
Attending: INTERNAL MEDICINE | Admitting: INTERNAL MEDICINE
Payer: COMMERCIAL

## 2022-11-30 VITALS
RESPIRATION RATE: 16 BRPM | WEIGHT: 247.8 LBS | DIASTOLIC BLOOD PRESSURE: 96 MMHG | HEIGHT: 72 IN | OXYGEN SATURATION: 98 % | HEART RATE: 70 BPM | SYSTOLIC BLOOD PRESSURE: 139 MMHG | TEMPERATURE: 97 F | BODY MASS INDEX: 33.56 KG/M2

## 2022-11-30 PROCEDURE — 160046 HCHG PACU - 1ST 60 MINS PHASE II: Performed by: INTERNAL MEDICINE

## 2022-11-30 PROCEDURE — 160025 RECOVERY II MINUTES (STATS): Performed by: INTERNAL MEDICINE

## 2022-11-30 PROCEDURE — C1769 GUIDE WIRE: HCPCS | Performed by: INTERNAL MEDICINE

## 2022-11-30 PROCEDURE — 160002 HCHG RECOVERY MINUTES (STAT): Performed by: INTERNAL MEDICINE

## 2022-11-30 PROCEDURE — 160036 HCHG PACU - EA ADDL 30 MINS PHASE I: Performed by: INTERNAL MEDICINE

## 2022-11-30 PROCEDURE — 160203 HCHG ENDO MINUTES - 1ST 30 MINS LEVEL 4: Performed by: INTERNAL MEDICINE

## 2022-11-30 PROCEDURE — 160208 HCHG ENDO MINUTES - EA ADDL 1 MIN LEVEL 4: Performed by: INTERNAL MEDICINE

## 2022-11-30 PROCEDURE — 160035 HCHG PACU - 1ST 60 MINS PHASE I: Performed by: INTERNAL MEDICINE

## 2022-11-30 PROCEDURE — 700101 HCHG RX REV CODE 250: Performed by: ANESTHESIOLOGY

## 2022-11-30 PROCEDURE — 700105 HCHG RX REV CODE 258: Performed by: INTERNAL MEDICINE

## 2022-11-30 PROCEDURE — 160009 HCHG ANES TIME/MIN: Performed by: INTERNAL MEDICINE

## 2022-11-30 PROCEDURE — 00790 ANES IPER UPR ABD NOS: CPT | Performed by: ANESTHESIOLOGY

## 2022-11-30 PROCEDURE — C1874 STENT, COATED/COV W/DEL SYS: HCPCS | Performed by: INTERNAL MEDICINE

## 2022-11-30 PROCEDURE — 700111 HCHG RX REV CODE 636 W/ 250 OVERRIDE (IP): Performed by: ANESTHESIOLOGY

## 2022-11-30 PROCEDURE — 160048 HCHG OR STATISTICAL LEVEL 1-5: Performed by: INTERNAL MEDICINE

## 2022-11-30 DEVICE — STENT PANCREATIC AXIOS 15MM: Type: IMPLANTABLE DEVICE | Status: FUNCTIONAL

## 2022-11-30 DEVICE — IMPLANTABLE DEVICE: Type: IMPLANTABLE DEVICE | Status: FUNCTIONAL

## 2022-11-30 RX ORDER — MEPERIDINE HYDROCHLORIDE 25 MG/ML
12.5 INJECTION INTRAMUSCULAR; INTRAVENOUS; SUBCUTANEOUS
Status: DISCONTINUED | OUTPATIENT
Start: 2022-11-30 | End: 2022-11-30 | Stop reason: HOSPADM

## 2022-11-30 RX ORDER — METOPROLOL TARTRATE 1 MG/ML
1 INJECTION, SOLUTION INTRAVENOUS
Status: DISCONTINUED | OUTPATIENT
Start: 2022-11-30 | End: 2022-11-30 | Stop reason: HOSPADM

## 2022-11-30 RX ORDER — ONDANSETRON 2 MG/ML
4 INJECTION INTRAMUSCULAR; INTRAVENOUS
Status: DISCONTINUED | OUTPATIENT
Start: 2022-11-30 | End: 2022-11-30 | Stop reason: HOSPADM

## 2022-11-30 RX ORDER — SODIUM CHLORIDE, SODIUM LACTATE, POTASSIUM CHLORIDE, CALCIUM CHLORIDE 600; 310; 30; 20 MG/100ML; MG/100ML; MG/100ML; MG/100ML
INJECTION, SOLUTION INTRAVENOUS CONTINUOUS
Status: CANCELLED | OUTPATIENT
Start: 2022-11-30 | End: 2022-11-30

## 2022-11-30 RX ORDER — HALOPERIDOL 5 MG/ML
1 INJECTION INTRAMUSCULAR
Status: DISCONTINUED | OUTPATIENT
Start: 2022-11-30 | End: 2022-11-30 | Stop reason: HOSPADM

## 2022-11-30 RX ORDER — HYDRALAZINE HYDROCHLORIDE 20 MG/ML
5 INJECTION INTRAMUSCULAR; INTRAVENOUS
Status: DISCONTINUED | OUTPATIENT
Start: 2022-11-30 | End: 2022-11-30 | Stop reason: HOSPADM

## 2022-11-30 RX ORDER — LIDOCAINE HYDROCHLORIDE 20 MG/ML
INJECTION, SOLUTION EPIDURAL; INFILTRATION; INTRACAUDAL; PERINEURAL PRN
Status: DISCONTINUED | OUTPATIENT
Start: 2022-11-30 | End: 2022-11-30 | Stop reason: SURG

## 2022-11-30 RX ORDER — DIPHENHYDRAMINE HYDROCHLORIDE 50 MG/ML
12.5 INJECTION INTRAMUSCULAR; INTRAVENOUS
Status: DISCONTINUED | OUTPATIENT
Start: 2022-11-30 | End: 2022-11-30 | Stop reason: HOSPADM

## 2022-11-30 RX ORDER — OXYCODONE HCL 5 MG/5 ML
5 SOLUTION, ORAL ORAL
Status: DISCONTINUED | OUTPATIENT
Start: 2022-11-30 | End: 2022-11-30 | Stop reason: HOSPADM

## 2022-11-30 RX ORDER — CEFAZOLIN SODIUM 1 G/3ML
INJECTION, POWDER, FOR SOLUTION INTRAMUSCULAR; INTRAVENOUS PRN
Status: DISCONTINUED | OUTPATIENT
Start: 2022-11-30 | End: 2022-11-30 | Stop reason: SURG

## 2022-11-30 RX ORDER — SODIUM CHLORIDE, SODIUM LACTATE, POTASSIUM CHLORIDE, CALCIUM CHLORIDE 600; 310; 30; 20 MG/100ML; MG/100ML; MG/100ML; MG/100ML
INJECTION, SOLUTION INTRAVENOUS CONTINUOUS
Status: DISCONTINUED | OUTPATIENT
Start: 2022-11-30 | End: 2022-11-30 | Stop reason: HOSPADM

## 2022-11-30 RX ORDER — IPRATROPIUM BROMIDE AND ALBUTEROL SULFATE 2.5; .5 MG/3ML; MG/3ML
3 SOLUTION RESPIRATORY (INHALATION)
Status: DISCONTINUED | OUTPATIENT
Start: 2022-11-30 | End: 2022-11-30 | Stop reason: HOSPADM

## 2022-11-30 RX ORDER — OXYCODONE HCL 5 MG/5 ML
10 SOLUTION, ORAL ORAL
Status: DISCONTINUED | OUTPATIENT
Start: 2022-11-30 | End: 2022-11-30 | Stop reason: HOSPADM

## 2022-11-30 RX ADMIN — LIDOCAINE HYDROCHLORIDE 100 MG: 20 INJECTION, SOLUTION EPIDURAL; INFILTRATION; INTRACAUDAL at 08:30

## 2022-11-30 RX ADMIN — CEFAZOLIN 2 G: 330 INJECTION, POWDER, FOR SOLUTION INTRAMUSCULAR; INTRAVENOUS at 08:29

## 2022-11-30 RX ADMIN — SODIUM CHLORIDE, POTASSIUM CHLORIDE, SODIUM LACTATE AND CALCIUM CHLORIDE: 600; 310; 30; 20 INJECTION, SOLUTION INTRAVENOUS at 08:27

## 2022-11-30 RX ADMIN — PROPOFOL 150 MG: 10 INJECTION, EMULSION INTRAVENOUS at 08:30

## 2022-11-30 RX ADMIN — PROPOFOL 50 MG: 10 INJECTION, EMULSION INTRAVENOUS at 08:49

## 2022-11-30 ASSESSMENT — PAIN SCALES - GENERAL: PAIN_LEVEL: 0

## 2022-11-30 ASSESSMENT — PAIN DESCRIPTION - PAIN TYPE: TYPE: ACUTE PAIN

## 2022-11-30 ASSESSMENT — FIBROSIS 4 INDEX: FIB4 SCORE: 0.76

## 2022-11-30 NOTE — OR SURGEON
EGD, EUS and cyst gastrostomy operative note    PreOp Diagnosis: Large symptomatic pseudocyst      PostOp Diagnosis: Same      Procedure(s):  UPPER RADIAL ENDOSCOPIC ULTRASOUND WITH PANCREAS SYMPTOMATIC PSEUDOCYST WITH FINE NEEDLE ASPIRATION WITH POSSIBLE STENT - Wound Class: Clean Contaminated  EGD, WITH ENDOSCOPIC US - Wound Class: Clean Contaminated    Surgeon(s):  Geoff Rodrigez M.D.    Anesthesiologist/Type of Anesthesia:  Anesthesiologist: Ronaldo Jeronimo M.D./General    Surgical Staff:  Circulator: Oscar Tyler R.N.  Endoscopy Technician: Mary White; Jenny Cartwright; Eboni Herrera    Specimens removed if any:  * No specimens in log *    Dr. Rodrigez  [facility]  [place]  [phone]    EGD    EUS with: Cyst gastrostomy stent placement    Indication: Large symptomatic pseudocyst    Sedation: MAC (Dr. JOSETTE Jeronimo)    Findings:   EGD    Esophagus     Unremarkable mucosa    Stomach     Unremarkable mucosa     Smooth extrinsic compression of the proximal posterior stomach    Duodenum     Unremarkable mucosa     EUS    Celiac axis     No adenopathy    Pancreas body/tail     Mild nonshadowing hyperechoic stranding and foci     Otherwise unremarkable parenchyma    Pancreatic duct     Normal course and caliber    Pseudocyst     Hypoechoic, well-defined with no solid component     11.1 cm x 10.5 cm     3.6 mm from gastric wall        Cyst gastrostomy     Cyst pierced with Axios 15mm x 10mm device with cautery     0.035 wire coiled in cyst     Stent deployed in usual fashion under both endosonographic and visual confirmation     Copious cola colored fluid expressed and suctioned    Plan:  Repeat the procedure in 2 to 4 weeks for stent removal    Small can of Coca-Cola daily    Call office if any concerns arise      Procedure detail:    Prior to procedure, informed consent was obtained.  Risks, benefits, alternatives, including but not limited to risk of bleeding, infection,  perforation, adverse reaction to sedating medicine, failure to identify pathology, pancreatitis, arterial bleeding and death were explained to the patient who accepted all risks.    Patient was prepped in the left lateral position and IV propofol sedation was provided by anesthesia.    EGD  Scope tip of the Olympus flexible gastroscope was passed in the proximal esophagus.  Proximal middle and distal thirds of the esophagus were well visualized and were unremarkable.  The stomach was entered and gastric pool was suctioned.  Air was insufflated and the scope was retroflexed to view the the body fundus and cardia then straightened to view the antrum and incisura.  The mucosa itself was unremarkable throughout but in the proximal posterior stomach there was extrinsic compression consistent with known pseudocyst.  The gastric antrum was unremarkable the pylorus is patent and the duodenal bulb, sweep second and third portion were unremarkable.  The gastroscope was removed and kept in the room and we proceeded with endoscopic ultrasound.    EUS  The flexible linear echoendoscope was passed in the proximal stomach.  Imaging of the celiac axis was unremarkable with no adenopathy.  Imaging of the pancreatic body and tail demonstrated mild nonshadowing hyperechoic stranding and foci but otherwise unremarkable parenchyma.  The pancreatic duct had normal course and caliber.  The previously noted pseudocyst was identified and measured 10.5 centimeters by 11.1 cm.  It was 3.6 mm from the gastric wall.  It was hypoechoic well-defined and rounded with no solid component.  There was no vascular flow on Doppler ultrasound.  Initially at 10 x 10 mm Axios stent was chosen and passed down the scope channel but would not seat properly on the scope and both the scope and the stent device were removed.  A new scope was prepped and passed back down to the cyst.  The cyst was reidentified with the same features.  A 15 mm x 10 mm Axios stent  device was then chosen passed down the scope channel and utilizing cautery the tip of the stent device was passed into the cyst after confirming no vascular flow on Doppler ultrasound.  A 0.035 wire was then passed down the channel and coiled in the cyst.  The internal flange of the cyst gastrostomy stent was deployed and pulled up tight against the internal wall of the cyst.  Following the usual procedures a second flange was then deployed and the scope channel and the scope was withdrawn partially to visualize the stent which was then completely deployed with the external flange well-seated in the gastric lumen.  The internal flange was confirmed on endosonography to be in the cyst.  The device was removed and the liquid from the cyst was suctioned until no further flow was seen.  This was cola colored and copious in amount.  It was a thin liquid.  Once this was complete the procedure was deemed complete.  The scope was withdrawn.  Air and liquid were suctioned.  Patient tolerated the procedure well and was sent to recovery without immediate complications.      11/30/2022 9:01 AM Geoff Rodrigez M.D.

## 2022-11-30 NOTE — ANESTHESIA PREPROCEDURE EVALUATION
Case: [id number] Date/Time: 11/30/22 0815    Procedures:       UPPER RADIAL ENDOSCOPIC ULTRASOUND WITH PANCREAS SYMPTOMATIC PSEUDOCYST WITH FINE NEEDLE ASPIRATION WITH POSSIBLE STENT      EGD, WITH ENDOSCOPIC US    Anesthesia type: General    Pre-op diagnosis: LEFT UPPER QUADRANT PAIN    Location:  ENDOSCOPIC ULTRASOUND ROOM / SURGERY [facility]    Surgeons: Geoff Rodrigez M.D.          Relevant Problems   ANESTHESIA   (positive) MANSOOR (obstructive sleep apnea)      CARDIAC   (positive) A-fib (HCC)   (positive) Ventricular fibrillation (HCC)      ENDO   (positive) Subclinical hypothyroidism       Physical Exam    Airway   Mallampati: II  TM distance: >3 FB  Neck ROM: full       Cardiovascular - normal exam  Rhythm: regular  Rate: normal  (-) murmur     Dental - normal exam           Pulmonary - normal exam  Breath sounds clear to auscultation     Abdominal    Neurological - normal exam                 Anesthesia Plan    ASA 3   ASA physical status 3 criteria: moderate reduction of ejection fraction    Plan - general       Airway plan will be mask          Induction: intravenous      Pertinent diagnostic labs and testing reviewed    Informed Consent:    Anesthetic plan and risks discussed with patient.    Use of blood products discussed with: patient whom consented to blood products.

## 2022-11-30 NOTE — OR NURSING
1003- Patient arrived to phase 2. Patient changed out of surgical gown into clothes. Patient is A&Ox4. Patient reports no pain and no nausea. Vital signs taken and patient assessed.     1030-IV removed and discharge instructions read. All questions answered.     1039-Transported to car via ambulation. FIDE Stock escorted patient out. Discharged to care of responsible adult.

## 2022-11-30 NOTE — ANESTHESIA POSTPROCEDURE EVALUATION
Patient: Ross Blake    Procedure Summary     Date: 11/30/22 Room / Location:  ENDOSCOPIC ULTRASOUND ROOM / SURGERY [facility]    Anesthesia Start: 0827 Anesthesia Stop: 0903    Procedures:       UPPER RADIAL ENDOSCOPIC ULTRASOUND WITH PANCREAS SYMPTOMATIC PSEUDOCYST WITH FINE NEEDLE ASPIRATION WITH POSSIBLE STENT      EGD, WITH ENDOSCOPIC US Diagnosis:       Pancreatic cyst      (Pancreatic cyst [107249])    Surgeons: Geoff Rodrigez M.D. Responsible Provider: Ronaldo Jeronimo M.D.    Anesthesia Type: general ASA Status: 3          Final Anesthesia Type: general  Last vitals  BP   Blood Pressure: (!) 141/83    Temp   36.2 °C (97.2 °F)    Pulse   85   Resp   20    SpO2   94 %      Anesthesia Post Evaluation    Patient location during evaluation: PACU  Patient participation: complete - patient participated  Level of consciousness: awake and alert  Pain score: 0    Airway patency: patent  Anesthetic complications: no  Cardiovascular status: hemodynamically stable  Respiratory status: acceptable  Hydration status: euvolemic    PONV: none          There were no known notable events for this encounter.     Nurse Pain Score: 2 (NPRS)

## 2022-11-30 NOTE — ANESTHESIA TIME REPORT
Anesthesia Start and Stop Event Times     Date Time Event    11/30/2022 0818 Ready for Procedure     0827 Anesthesia Start     0903 Anesthesia Stop        Responsible Staff  11/30/22    Name Role Begin End    Ronaldo Jeronimo M.D. Anesth 0827 0903        Overtime Reason:  no overtime (within assigned shift)    Comments:

## 2022-11-30 NOTE — OR NURSING
0900: To PACU from endo via bed,  sleeping, respirations spontaneous and non-labored via mask. STOPBANG protocol initiated.      0915: Pt awake, pt refusing to wear home CPAP at this time. Pt denies pain and nausea at this time. Pt tolerating PO fluids at this time. MD at bedside.     0930: Pt spouse updated.     0945: No change.     1000: Meets criteria to transfer to Stage 2. Report given to FIDE Medel.

## 2022-11-30 NOTE — DISCHARGE INSTRUCTIONS
What to Expect Post Anesthesia    Rest and take it easy for the first 24 hours.  A responsible adult is recommended to remain with you during that time.  It is normal to feel sleepy.  We encourage you to not do anything that requires balance, judgment or coordination.    FOR 24 HOURS DO NOT:  Drive, operate machinery or run household appliances.  Drink beer or alcoholic beverages.  Make important decisions or sign legal documents.    To avoid nausea, slowly advance diet as tolerated, avoiding spicy or greasy foods for the first day.  Add more substantial food to your diet according to your provider's instructions.  Babies can be fed formula or breast milk as soon as they are hungry.  INCREASE FLUIDS AND FIBER TO AVOID CONSTIPATION.    MILD FLU-LIKE SYMPTOMS ARE NORMAL.  YOU MAY EXPERIENCE GENERALIZED MUSCLE ACHES, THROAT IRRITATION, HEADACHE AND/OR SOME NAUSEA.    If any questions arise, call your provider.  If your provider is not available, please feel free to call the Surgical Center at [phone].    MEDICATIONS: Resume taking daily medication.  Take prescribed pain medication with food.  If no medication is prescribed, you may take non-aspirin pain medication if needed.  PAIN MEDICATION CAN BE VERY CONSTIPATING.  Take a stool softener or laxative such as senokot, pericolace, or milk of magnesia if needed.    No pain medication received.    Diet    Resume pre-operative diet upon discharge from the hospital. Depending on how you are feeling and whether you have nausea or not, you may wish to stay with a bland diet for the first few days. However, you can advance your diet as quickly as you feel ready.    Activity    Resume Your Normal Activity    You may resume your normal activity as tolerated.  Rest as needed.        No Driving or Heavy Equipment    Do not drive or use heavy machinery/equipment for 24 hours.  You may ride in a car as a passenger.  You may need a medical release form signed by your follow-up  provider before you can drive again.    Dr. Rodrigez  [facility]  [place]  [phone]     EGD     EUS with:        Cyst gastrostomy stent placement     Indication:        Large symptomatic pseudocyst     Sedation:         MAC (Dr. JOSETTE Jeronimo)     Findings:              EGD                          Esophagus                                      Unremarkable mucosa                          Stomach                                      Unremarkable mucosa                                      Smooth extrinsic compression of the proximal posterior stomach                          Duodenum                                      Unremarkable mucosa                 EUS                          Celiac axis                                      No adenopathy                          Pancreas body/tail                                      Mild nonshadowing hyperechoic stranding and foci                                      Otherwise unremarkable parenchyma                          Pancreatic duct                                      Normal course and caliber                          Pseudocyst                                      Hypoechoic, well-defined with no solid component                                      11.1 cm x 10.5 cm                                      3.6 mm from gastric wall                                                    Cyst gastrostomy                                      Cyst pierced with Axios 15mm x 10mm device with cautery                                      0.035 wire coiled in cyst                                      Stent deployed in usual fashion under both endosonographic and visual confirmation                                      Copious cola colored fluid expressed and suctioned     Plan:                Repeat the procedure in 2 to 4 weeks for stent removal                          Small can of Coca-Cola daily                          Call office if any concerns arise

## 2022-12-22 ENCOUNTER — PRE-ADMISSION TESTING (OUTPATIENT)
Dept: ADMISSIONS | Facility: MEDICAL CENTER | Age: 52
End: 2022-12-22
Attending: INTERNAL MEDICINE
Payer: COMMERCIAL

## 2022-12-22 DIAGNOSIS — Z01.812 PRE-OPERATIVE LABORATORY EXAMINATION: ICD-10-CM

## 2022-12-22 NOTE — PREPROCEDURE INSTRUCTIONS
Pre admit appointment via telephone. History and medications reviewed. Pre-op instructions given, hand outs reviewed and questions answered. Pre admit video watched.    Anesthesia fasting guidelines reviewed. Patient instructed to continue regularly prescribed medications through the day before surgery. Per anesthesia protocol, patient instructed to take these medications with a sip of water the day of surgery: synthroid.    Patient requested to do labs at [facility] today, 12/22 or tomorrow 12/23.

## 2022-12-23 ENCOUNTER — HOSPITAL ENCOUNTER (OUTPATIENT)
Dept: LAB | Facility: MEDICAL CENTER | Age: 52
End: 2022-12-23
Attending: NURSE PRACTITIONER
Payer: COMMERCIAL

## 2022-12-23 DIAGNOSIS — Z01.812 PRE-OPERATIVE LABORATORY EXAMINATION: ICD-10-CM

## 2022-12-23 LAB
ANION GAP SERPL CALC-SCNC: 9 MMOL/L (ref 7–16)
BUN SERPL-MCNC: 18 MG/DL (ref 8–22)
CALCIUM SERPL-MCNC: 9 MG/DL (ref 8.5–10.5)
CHLORIDE SERPL-SCNC: 103 MMOL/L (ref 96–112)
CO2 SERPL-SCNC: 25 MMOL/L (ref 20–33)
CREAT SERPL-MCNC: 0.8 MG/DL (ref 0.5–1.4)
ERYTHROCYTE [DISTWIDTH] IN BLOOD BY AUTOMATED COUNT: 46.9 FL (ref 35.9–50)
GFR SERPLBLD CREATININE-BSD FMLA CKD-EPI: 108 ML/MIN/1.73 M 2
GLUCOSE SERPL-MCNC: 95 MG/DL (ref 65–99)
HCT VFR BLD AUTO: 44.3 % (ref 42–52)
HGB BLD-MCNC: 14.7 G/DL (ref 14–18)
MCH RBC QN AUTO: 29.5 PG (ref 27–33)
MCHC RBC AUTO-ENTMCNC: 33.2 G/DL (ref 33.7–35.3)
MCV RBC AUTO: 89 FL (ref 81.4–97.8)
PLATELET # BLD AUTO: 185 K/UL (ref 164–446)
PMV BLD AUTO: 9.6 FL (ref 9–12.9)
POTASSIUM SERPL-SCNC: 4.8 MMOL/L (ref 3.6–5.5)
RBC # BLD AUTO: 4.98 M/UL (ref 4.7–6.1)
SODIUM SERPL-SCNC: 137 MMOL/L (ref 135–145)
WBC # BLD AUTO: 6 K/UL (ref 4.8–10.8)

## 2022-12-23 PROCEDURE — 85027 COMPLETE CBC AUTOMATED: CPT

## 2022-12-23 PROCEDURE — 80048 BASIC METABOLIC PNL TOTAL CA: CPT

## 2022-12-23 PROCEDURE — 36415 COLL VENOUS BLD VENIPUNCTURE: CPT

## 2022-12-30 ENCOUNTER — ANESTHESIA (OUTPATIENT)
Dept: SURGERY | Facility: MEDICAL CENTER | Age: 52
End: 2022-12-30
Payer: COMMERCIAL

## 2022-12-30 ENCOUNTER — HOSPITAL ENCOUNTER (OUTPATIENT)
Facility: MEDICAL CENTER | Age: 52
End: 2022-12-30
Attending: INTERNAL MEDICINE | Admitting: INTERNAL MEDICINE
Payer: COMMERCIAL

## 2022-12-30 ENCOUNTER — ANESTHESIA EVENT (OUTPATIENT)
Dept: SURGERY | Facility: MEDICAL CENTER | Age: 52
End: 2022-12-30
Payer: COMMERCIAL

## 2022-12-30 VITALS
WEIGHT: 257.72 LBS | DIASTOLIC BLOOD PRESSURE: 79 MMHG | RESPIRATION RATE: 16 BRPM | HEART RATE: 79 BPM | SYSTOLIC BLOOD PRESSURE: 123 MMHG | BODY MASS INDEX: 34.91 KG/M2 | OXYGEN SATURATION: 96 % | HEIGHT: 72 IN | TEMPERATURE: 97.9 F

## 2022-12-30 PROCEDURE — 160203 HCHG ENDO MINUTES - 1ST 30 MINS LEVEL 4: Performed by: INTERNAL MEDICINE

## 2022-12-30 PROCEDURE — 700105 HCHG RX REV CODE 258: Performed by: INTERNAL MEDICINE

## 2022-12-30 PROCEDURE — 700111 HCHG RX REV CODE 636 W/ 250 OVERRIDE (IP): Performed by: ANESTHESIOLOGY

## 2022-12-30 PROCEDURE — 160002 HCHG RECOVERY MINUTES (STAT): Performed by: INTERNAL MEDICINE

## 2022-12-30 PROCEDURE — 160035 HCHG PACU - 1ST 60 MINS PHASE I: Performed by: INTERNAL MEDICINE

## 2022-12-30 PROCEDURE — 160009 HCHG ANES TIME/MIN: Performed by: INTERNAL MEDICINE

## 2022-12-30 PROCEDURE — 160048 HCHG OR STATISTICAL LEVEL 1-5: Performed by: INTERNAL MEDICINE

## 2022-12-30 PROCEDURE — 160025 RECOVERY II MINUTES (STATS): Performed by: INTERNAL MEDICINE

## 2022-12-30 PROCEDURE — 700101 HCHG RX REV CODE 250: Performed by: ANESTHESIOLOGY

## 2022-12-30 PROCEDURE — 00731 ANES UPR GI NDSC PX NOS: CPT | Performed by: ANESTHESIOLOGY

## 2022-12-30 PROCEDURE — 700101 HCHG RX REV CODE 250: Performed by: INTERNAL MEDICINE

## 2022-12-30 PROCEDURE — 160046 HCHG PACU - 1ST 60 MINS PHASE II: Performed by: INTERNAL MEDICINE

## 2022-12-30 RX ORDER — HALOPERIDOL 5 MG/ML
1 INJECTION INTRAMUSCULAR
Status: DISCONTINUED | OUTPATIENT
Start: 2022-12-30 | End: 2022-12-30 | Stop reason: HOSPADM

## 2022-12-30 RX ORDER — IPRATROPIUM BROMIDE AND ALBUTEROL SULFATE 2.5; .5 MG/3ML; MG/3ML
3 SOLUTION RESPIRATORY (INHALATION)
Status: DISCONTINUED | OUTPATIENT
Start: 2022-12-30 | End: 2022-12-30 | Stop reason: HOSPADM

## 2022-12-30 RX ORDER — HYDROMORPHONE HYDROCHLORIDE 1 MG/ML
1 INJECTION, SOLUTION INTRAMUSCULAR; INTRAVENOUS; SUBCUTANEOUS
Status: DISCONTINUED | OUTPATIENT
Start: 2022-12-30 | End: 2022-12-30 | Stop reason: HOSPADM

## 2022-12-30 RX ORDER — HYDROMORPHONE HYDROCHLORIDE 1 MG/ML
0.4 INJECTION, SOLUTION INTRAMUSCULAR; INTRAVENOUS; SUBCUTANEOUS
Status: DISCONTINUED | OUTPATIENT
Start: 2022-12-30 | End: 2022-12-30 | Stop reason: HOSPADM

## 2022-12-30 RX ORDER — HYDROMORPHONE HYDROCHLORIDE 1 MG/ML
0.2 INJECTION, SOLUTION INTRAMUSCULAR; INTRAVENOUS; SUBCUTANEOUS
Status: DISCONTINUED | OUTPATIENT
Start: 2022-12-30 | End: 2022-12-30 | Stop reason: HOSPADM

## 2022-12-30 RX ORDER — SODIUM CHLORIDE, SODIUM LACTATE, POTASSIUM CHLORIDE, CALCIUM CHLORIDE 600; 310; 30; 20 MG/100ML; MG/100ML; MG/100ML; MG/100ML
INJECTION, SOLUTION INTRAVENOUS CONTINUOUS
Status: DISCONTINUED | OUTPATIENT
Start: 2022-12-30 | End: 2022-12-30 | Stop reason: HOSPADM

## 2022-12-30 RX ORDER — OXYCODONE HCL 5 MG/5 ML
5 SOLUTION, ORAL ORAL
Status: DISCONTINUED | OUTPATIENT
Start: 2022-12-30 | End: 2022-12-30 | Stop reason: HOSPADM

## 2022-12-30 RX ORDER — SODIUM CHLORIDE, SODIUM GLUCONATE, SODIUM ACETATE, POTASSIUM CHLORIDE AND MAGNESIUM CHLORIDE 526; 502; 368; 37; 30 MG/100ML; MG/100ML; MG/100ML; MG/100ML; MG/100ML
500 INJECTION, SOLUTION INTRAVENOUS CONTINUOUS
Status: DISCONTINUED | OUTPATIENT
Start: 2022-12-30 | End: 2022-12-30 | Stop reason: HOSPADM

## 2022-12-30 RX ORDER — OXYCODONE HCL 5 MG/5 ML
10 SOLUTION, ORAL ORAL
Status: DISCONTINUED | OUTPATIENT
Start: 2022-12-30 | End: 2022-12-30 | Stop reason: HOSPADM

## 2022-12-30 RX ORDER — MEPERIDINE HYDROCHLORIDE 25 MG/ML
12.5 INJECTION INTRAMUSCULAR; INTRAVENOUS; SUBCUTANEOUS
Status: DISCONTINUED | OUTPATIENT
Start: 2022-12-30 | End: 2022-12-30 | Stop reason: HOSPADM

## 2022-12-30 RX ORDER — ONDANSETRON 2 MG/ML
4 INJECTION INTRAMUSCULAR; INTRAVENOUS
Status: DISCONTINUED | OUTPATIENT
Start: 2022-12-30 | End: 2022-12-30 | Stop reason: HOSPADM

## 2022-12-30 RX ORDER — DIPHENHYDRAMINE HYDROCHLORIDE 50 MG/ML
12.5 INJECTION INTRAMUSCULAR; INTRAVENOUS
Status: DISCONTINUED | OUTPATIENT
Start: 2022-12-30 | End: 2022-12-30 | Stop reason: HOSPADM

## 2022-12-30 RX ORDER — MIDAZOLAM HYDROCHLORIDE 1 MG/ML
1 INJECTION INTRAMUSCULAR; INTRAVENOUS
Status: DISCONTINUED | OUTPATIENT
Start: 2022-12-30 | End: 2022-12-30 | Stop reason: HOSPADM

## 2022-12-30 RX ORDER — LIDOCAINE HYDROCHLORIDE 20 MG/ML
INJECTION, SOLUTION EPIDURAL; INFILTRATION; INTRACAUDAL; PERINEURAL PRN
Status: DISCONTINUED | OUTPATIENT
Start: 2022-12-30 | End: 2022-12-30 | Stop reason: SURG

## 2022-12-30 RX ADMIN — LIDOCAINE HYDROCHLORIDE 50 MG: 20 INJECTION, SOLUTION EPIDURAL; INFILTRATION; INTRACAUDAL at 08:29

## 2022-12-30 RX ADMIN — EPHEDRINE SULFATE 5 MG: 50 INJECTION, SOLUTION INTRAVENOUS at 08:29

## 2022-12-30 RX ADMIN — LIDOCAINE HYDROCHLORIDE 0.5 ML: 10 INJECTION, SOLUTION EPIDURAL; INFILTRATION; INTRACAUDAL; PERINEURAL at 08:00

## 2022-12-30 RX ADMIN — SODIUM CHLORIDE, POTASSIUM CHLORIDE, SODIUM LACTATE AND CALCIUM CHLORIDE: 600; 310; 30; 20 INJECTION, SOLUTION INTRAVENOUS at 08:00

## 2022-12-30 RX ADMIN — PROPOFOL 50 MG: 10 INJECTION, EMULSION INTRAVENOUS at 08:25

## 2022-12-30 RX ADMIN — PROPOFOL 100 MG: 10 INJECTION, EMULSION INTRAVENOUS at 08:24

## 2022-12-30 ASSESSMENT — PAIN SCALES - GENERAL: PAIN_LEVEL: 0

## 2022-12-30 ASSESSMENT — PAIN DESCRIPTION - PAIN TYPE: TYPE: SURGICAL PAIN

## 2022-12-30 ASSESSMENT — FIBROSIS 4 INDEX
FIB4 SCORE: 1.2
FIB4 SCORE: 1.2

## 2022-12-30 NOTE — ANESTHESIA PREPROCEDURE EVALUATION
Case: [id number] Date/Time: 12/30/22 0915    Procedures:       ESOPHAGOGASTRODUODENOSCOPY WITH CYST GASTROSTOMY STENT REVISION (Abdomen)      GASTROSCOPY, WITH ESOPHAGEAL STENT (Abdomen)    Anesthesia type: General    Pre-op diagnosis: PANCREATIC PSEUDOCYST    Location:  ENDOSCOPIC ULTRASOUND ROOM / SURGERY [facility]    Surgeons: Geoff Rodrigez M.D.          Relevant Problems   ANESTHESIA   (positive) MANSOOR (obstructive sleep apnea)      CARDIAC   (positive) A-fib (HCC)   (positive) Ventricular fibrillation (HCC)      ENDO   (positive) Subclinical hypothyroidism       Physical Exam    Airway   Mallampati: II  TM distance: >3 FB  Neck ROM: full       Cardiovascular - normal exam  Rhythm: regular  Rate: normal  (-) murmur     Dental - normal exam           Pulmonary - normal exam  Breath sounds clear to auscultation     Abdominal    Neurological - normal exam                 Anesthesia Plan    ASA 3       Plan - general       Airway plan will be natural airway          Induction: intravenous    Postoperative Plan: Postoperative administration of opioids is intended.    Pertinent diagnostic labs and testing reviewed    Informed Consent:    Anesthetic plan and risks discussed with patient.    Use of blood products discussed with: patient whom consented to blood products.

## 2022-12-30 NOTE — ANESTHESIA POSTPROCEDURE EVALUATION
Patient: Ross Blake    Procedure Summary     Date: 12/30/22 Room / Location:  ENDOSCOPIC ULTRASOUND ROOM / SURGERY [facility]    Anesthesia Start: 0824 Anesthesia Stop: 0841    Procedures:       ESOPHAGOGASTRODUODENOSCOPY WITH CYST GASTROSTOMY STENT REVISION (Abdomen)      GASTROSCOPY, WITH ESOPHAGEAL STENT (Abdomen) Diagnosis: (PANCREATIC PSEUDOCYST)    Surgeons: Geoff Rodrigez M.D. Responsible Provider: Hernan Fagan III, M.D.    Anesthesia Type: general ASA Status: 3          Final Anesthesia Type: general  Last vitals  BP   Blood Pressure: 106/76    Temp   36.6 °C (97.9 °F)    Pulse   83   Resp   16    SpO2   97 %      Anesthesia Post Evaluation    Patient location during evaluation: PACU  Patient participation: complete - patient participated  Level of consciousness: awake and alert  Pain score: 0    Airway patency: patent  Anesthetic complications: no  Cardiovascular status: hemodynamically stable  Respiratory status: acceptable  Hydration status: euvolemic    PONV: none          No notable events documented.     Nurse Pain Score: 0 (NPRS)         EMS

## 2022-12-30 NOTE — DISCHARGE INSTRUCTIONS
ENDOSCOPY HOME CARE INSTRUCTIONS    GASTROSCOPY OR ERCP  1. Don't eat or drink anything for about an hour after the test.   2. Don't drive or drink alcohol for 24 hours. The medication you received will make you too drowsy.  3. Don't take any coffee, tea, or aspirin products until after you see your doctor. These can harm the lining of your stomach.  4. If you begin to vomit bloody material, or develop black or bloody stools, call your doctor as soon as possible.  5. If you have any neck, chest, abdominal pain or temp of 100 degrees, call your doctor.     EGD with stent removal operative note     PreOp Diagnosis:        History of pancreatic pseudocyst currently with cyst gastrostomy stent        PostOp Diagnosis:      Same        Procedure(s):  ESOPHAGOGASTRODUODENOSCOPY WITH CYST GASTROSTOMY STENT REVISION - Wound Class: None  GASTROSCOPY, WITH ESOPHAGEAL STENT - Wound Class: None      Dr. Rodrigez  [facility]  [place]  [phone]     EGD with:                    Cyst gastrostomy stent removal     Indication:                    History of pancreatic pseudocyst currently with cyst gastrostomy stent     Sedation:                     MAC Dr. Fagan      Findings:                          Esophagus                                      Unremarkable mucosa                          Stomach                                      Hiatal hernia 45 to 47 cm                                      Axios cyst gastrostomy stent in good position                                      Pancreatic cyst collapsed with no lumen appreciated  Remainder of gastric mucosa unremarkable                          Duodenum                                      Unremarkable mucosa     Plan:                            Full liquid diet today                                      Continue PPI therapy                                      Advance diet as tolerated tomorrow                                      Contact office if any concerns  arise        You should call 911 if you develop problems with breathing or chest pain.  If any questions arise, call your doctor. If your doctor is not available, please feel free to call [phone]. You can also call the HEALTH HOTLINE open 24 hours/day, 7 days/week and speak to a nurse at [phone], or toll free [phone].

## 2022-12-30 NOTE — OR NURSING
0855- Received report from Valorie ELIZALDE    0910- Stop bang hold waived by anesthesiologist. Meets criteria to transfer to phase 2 for DC. No pain or nausea reported. Report given to Valorie ELIZALDE    0911- Transferred to phase 2 for DC

## 2022-12-30 NOTE — OR SURGEON
EGD with stent removal operative note    PreOp Diagnosis: History of pancreatic pseudocyst currently with cyst gastrostomy stent      PostOp Diagnosis: Same      Procedure(s):  ESOPHAGOGASTRODUODENOSCOPY WITH CYST GASTROSTOMY STENT REVISION - Wound Class: None  GASTROSCOPY, WITH ESOPHAGEAL STENT - Wound Class: None    Surgeon(s):  Geoff Rodrigez M.D.    Anesthesiologist/Type of Anesthesia:  Anesthesiologist: Hernan Fagan III, M.D./General    Surgical Staff:  Circulator: Kirt Bajwa R.N.  Endoscopy Technician: Eboni Herrera    Specimens removed if any:  * No specimens in log *    Dr. Rodrigez  [facility]  [place]  [phone]    EGD with:  Cyst gastrostomy stent removal    Indication:  History of pancreatic pseudocyst currently with cyst gastrostomy stent    Sedation:  MAC Dr. Fagan     Findings:    Esophagus     Unremarkable mucosa    Stomach     Hiatal hernia 45 to 47 cm     Axios cyst gastrostomy stent in good position     Pancreatic cyst collapsed with no lumen appreciated  Remainder of gastric mucosa unremarkable    Duodenum     Unremarkable mucosa    Plan:   Full liquid diet today     Continue PPI therapy     Advance diet as tolerated tomorrow     Contact office if any concerns arise    Procedure detail:    Prior to procedure, informed consent was obtained.  Risks, benefits, alternatives, including but not limited to risk of bleeding, infection, perforation, adverse reaction to sedating medicine, failure to identify pathology and death were explained to the patient who accepted all risks.    Patient was prepped in the left lateral position and IV propofol sedation was provided by anesthesia.    Scope tip of the Olympus flexible gastroscope was passed into the proximal esophagus.  Proximal middle and distal thirds of the esophagus were well visualized and were unremarkable.  The GE junction was regular at 45 cm.  The stomach was entered there was a hiatal hernia from 45 to 47  cm.  Gastric pool was suctioned and air was insufflated.  Scope was retroflexed to view the body fundus and cardia then straightened to view the antrum and incisura.  The mucosa was unremarkable.  The previously placed cyst gastrostomy stent was in place in the posterior proximal body of the stomach.  The orifice was entered with the scope tip and all that was seen was granulation tissue with no residual pancreatic cyst appreciated.  Scope was withdrawn back into the main body of the stomach and passed the antrum and incisura which were unremarkable.  The pylorus was patent.  Duodenal bulb, sweep, second and third portion were unremarkable.  Scope was withdrawn back into the stomach and a 25 mm snare was used to snare the axios stent in the usual fashion.  With gentle tension this was withdrawn.  The stent was withdrawn intact through the mouth and the scope was reintroduced into the proximal esophagus.  The esophagus was closely examined and there was no trauma.  The stomach was entered.  The orifice of the cyst gastrostomy was examined with the scope tip and no cyst cavity was appreciated.  There was minimal self-limited bleeding.  After observing the site and confirming stability the procedure was deemed complete.  The scope was withdrawn.  Air and liquid were suctioned.  Patient tolerated the procedure well and was sent to recovery without immediate complications.        12/30/2022 8:37 AM Geoff Rodrigez M.D.

## 2022-12-30 NOTE — OR NURSING
0840: To PACU from EUS via gurney, awake/alert with no narcotics given by anesthesia so CPAP use not initiated. O2 d/radha.  0855: s/b Dr Ramirez. Report to Lizy ELIZALDE

## 2022-12-30 NOTE — ANESTHESIA TIME REPORT
Anesthesia Start and Stop Event Times     Date Time Event    12/30/2022 0810 Ready for Procedure     0824 Anesthesia Start     0841 Anesthesia Stop        Responsible Staff  12/30/22    Name Role Begin End    Hernan Fagan III, M.D. Anesth 0824 0841        Overtime Reason:  no overtime (within assigned shift)    Comments:

## 2023-02-23 ENCOUNTER — OFFICE VISIT (OUTPATIENT)
Dept: CARDIOLOGY | Facility: MEDICAL CENTER | Age: 53
End: 2023-02-23
Payer: COMMERCIAL

## 2023-02-23 VITALS
HEIGHT: 72 IN | DIASTOLIC BLOOD PRESSURE: 82 MMHG | RESPIRATION RATE: 16 BRPM | WEIGHT: 266 LBS | HEART RATE: 86 BPM | BODY MASS INDEX: 36.03 KG/M2 | SYSTOLIC BLOOD PRESSURE: 114 MMHG | OXYGEN SATURATION: 96 %

## 2023-02-23 DIAGNOSIS — Z79.899 HIGH RISK MEDICATION USE: ICD-10-CM

## 2023-02-23 DIAGNOSIS — I47.20 VENTRICULAR TACHYARRHYTHMIA (HCC): ICD-10-CM

## 2023-02-23 DIAGNOSIS — I10 ESSENTIAL HYPERTENSION, BENIGN: ICD-10-CM

## 2023-02-23 DIAGNOSIS — R94.31 PROLONGED Q-T INTERVAL ON ECG: ICD-10-CM

## 2023-02-23 DIAGNOSIS — I50.21 ACUTE SYSTOLIC HEART FAILURE (HCC): ICD-10-CM

## 2023-02-23 DIAGNOSIS — I50.20 ACC/AHA STAGE C SYSTOLIC HEART FAILURE (HCC): ICD-10-CM

## 2023-02-23 DIAGNOSIS — I49.01 VENTRICULAR FIBRILLATION (HCC): ICD-10-CM

## 2023-02-23 DIAGNOSIS — I48.0 PAROXYSMAL ATRIAL FIBRILLATION (HCC): ICD-10-CM

## 2023-02-23 DIAGNOSIS — Z95.810 ICD (IMPLANTABLE CARDIOVERTER-DEFIBRILLATOR), DUAL, IN SITU: ICD-10-CM

## 2023-02-23 DIAGNOSIS — G47.33 OSA (OBSTRUCTIVE SLEEP APNEA): ICD-10-CM

## 2023-02-23 PROCEDURE — 99213 OFFICE O/P EST LOW 20 MIN: CPT | Performed by: INTERNAL MEDICINE

## 2023-02-23 ASSESSMENT — ENCOUNTER SYMPTOMS
SHORTNESS OF BREATH: 0
DIZZINESS: 0
BRUISES/BLEEDS EASILY: 0
EYES NEGATIVE: 1
PALPITATIONS: 0
CARDIOVASCULAR NEGATIVE: 1
FEVER: 0
ORTHOPNEA: 0
PND: 0
MUSCULOSKELETAL NEGATIVE: 1
LOSS OF CONSCIOUSNESS: 0
CHILLS: 0
STRIDOR: 0
HEMOPTYSIS: 0
WEAKNESS: 0
SPUTUM PRODUCTION: 0
WHEEZING: 0
NEUROLOGICAL NEGATIVE: 1
SORE THROAT: 0
RESPIRATORY NEGATIVE: 1
CLAUDICATION: 0
GASTROINTESTINAL NEGATIVE: 1
CONSTITUTIONAL NEGATIVE: 1
COUGH: 0

## 2023-02-23 ASSESSMENT — MINNESOTA LIVING WITH HEART FAILURE QUESTIONNAIRE (MLHF)
HAVING TO SIT OR LIE DOWN DURING THE DAY: 0
DIFFICULTY SOCIALIZING WITH FAMILY OR FRIENDS: 0
COSTING YOU MONEY FOR MEDICAL CARE: 0
MAKING YOU FEEL DEPRESSED: 0
MAKING YOU WORRY: 0
TIRED, FATIGUED OR LOW ON ENERGY: 0
DIFFICULTY GOING AWAY FROM HOME: 0
MAKING YOU STAY IN A HOSPITAL: 0
SWELLING IN ANKLES OR LEGS: 1
EATING LESS FOODS YOU LIKE: 1
WORKING AROUND THE HOUSE OR YARD DIFFICULT: 0
DIFFICULTY TO CONCENTRATE OR REMEMBERING THINGS: 0
DIFFICULTY WORKING TO EARN A LIVING: 0
WALKING ABOUT OR CLIMBING STAIRS DIFFICULT: 0
DIFFICULTY SLEEPING WELL AT NIGHT: 0
GIVING YOU SIDE EFFECTS FROM TREATMENTS: 1
DIFFICULTY WITH SEXUAL ACTIVITIES: 0
TOTAL_SCORE: 3
LOSS OF SELF CONTROL IN YOUR LIFE: 0
MAKING YOU SHORT OF BREATH: 0
FEELING LIKE A BURDEN TO FAMILY AND FRIENDS: 0
DIFFICULTY WITH RECREATIONAL PASTIMES, SPORTS, HOBBIES: 0

## 2023-02-23 ASSESSMENT — FIBROSIS 4 INDEX: FIB4 SCORE: 1.2

## 2023-02-24 NOTE — PROGRESS NOTES
Chief Complaint   Patient presents with    Atrial Fibrillation    Follow-Up     Dx: Acute systolic heart failure (HCC)         Subjective     Ras Blake is a 51 y.o. male who presents today as a new consultation for heart failure.  He is a 51-year-old male who had ventricular tachycardia arrest and subdural hematoma.  At the time he was drinking a pint of vodka or tequila per day.  His EF is normalized.      He was last seen has been doing well.  He self discontinued his losartan.  He has no chest pain or lower extremity edema.  He has been doing extraordinarily well.  He is only occasionally taking the furosemide.  He has had no recurrence of his atrial fibrillation.  His pacemaker interrogations have been normal.      Past Medical History:   Diagnosis Date    Acute systolic heart failure (HCC) 2021    resolved, following with cardiology Dr Leilani THURMAN/Pacemaker     6/21    Arrhythmia     RBBB, Hx V fib, Hx V tach    Disorder of thyroid     Hashimotos, Left thyroid nodule    Hypertension     ICD (implantable cardioverter-defibrillator) in place     Dr. Torres, followed by Dr Leilani Kevin    Myocardial infarct ([facility]) 06/2021    Seizure ([facility]) 08/2021    Following craniotomy x1    Sleep apnea     cpap    Snoring      Past Surgical History:   Procedure Laterality Date    NE UPPER GI ENDOSCOPY,DIAGNOSIS N/A 12/30/2022    Procedure: ESOPHAGOGASTRODUODENOSCOPY WITH CYST GASTROSTOMY STENT REVISION;  Surgeon: Geoff Rodrigez M.D.;  Location: [facility];  Service: Gastroenterology    NE EGD ENDOSCOPIC STENT PLACE N/A 12/30/2022    Procedure: GASTROSCOPY;  Surgeon: Geoff Rodrigez M.D.;  Location: [facility];  Service: Gastroenterology    NE UPPER GI ENDOSCOPY,DIAGNOSIS N/A 11/30/2022    Procedure: UPPER RADIAL ENDOSCOPIC ULTRASOUND WITH PANCREAS SYMPTOMATIC PSEUDOCYST WITH FINE NEEDLE ASPIRATION WITH STENT;  Surgeon: Geoff Rodrigez M.D.;  Location: [facility];   Service: EUS    EGD W/ENDOSCOPIC ULTRASOUND N/A 11/30/2022    Procedure: EGD, WITH ENDOSCOPIC US;  Surgeon: Geoff Rodrigez M.D.;  Location: [facility];  Service: EUS    NE UPPER GI ENDOSCOPY,DIAGNOSIS N/A 10/17/2022    Procedure: UPPER RADIAL ENDOSCOPIC ULTRASOUND,  PANCREAS SYMPTOMATIC PSEUDOCYST WITH FINE NEEDLE ASPIRATION;  Surgeon: Geoff Rodrigez M.D.;  Location: [facility];  Service: EUS    EGD W/ENDOSCOPIC ULTRASOUND N/A 10/17/2022    Procedure: EGD, WITH ENDOSCOPIC US;  Surgeon: Geoff Rodrigez M.D.;  Location: [facility];  Service: EUS    EGD WITH ASP/BX  10/17/2022    Procedure: EGD, WITH ASPIRATION BIOPSY;  Surgeon: Geoff Rodrigez M.D.;  Location: [facility];  Service: EUS    OTHER  09/2022    Throid biopsy    CRANIOTOMY  08/2021    Pt fell    PACEMAKER INSERTION  07/2021    Pacemaker/AICD placed    VEIN STRIPPING Bilateral 1992    vein ablation x2 2003, 2018    OTHER CARDIAC SURGERY  June 2021     History reviewed. No pertinent family history.  Social History     Socioeconomic History    Marital status:      Spouse name: Not on file    Number of children: Not on file    Years of education: Not on file    Highest education level: Not on file   Occupational History    Not on file   Tobacco Use    Smoking status: Former     Packs/day: 0.50     Years: 10.00     Pack years: 5.00     Types: Cigarettes     Quit date: 1/1/2020     Years since quitting: 3.1    Smokeless tobacco: Never   Vaping Use    Vaping Use: Former    Quit date: 5/14/2021   Substance and Sexual Activity    Alcohol use: Not Currently    Drug use: Not Currently     Types: Inhaled    Sexual activity: Not on file   Other Topics Concern    Not on file   Social History Narrative    Not on file     Social Determinants of Health     Financial Resource Strain: Not on file   Food Insecurity: Not on file   Transportation Needs: Not on file   Physical Activity: Not on file    Stress: Not on file   Social Connections: Not on file   Intimate Partner Violence: Not on file   Housing Stability: Not on file     Allergies   Allergen Reactions    Amiodarone      TSH elevated    Eplerenone      Gynecomastia     Outpatient Encounter Medications as of 2/23/2023   Medication Sig Dispense Refill    SYNTHROID 50 MCG Tab TAKE ONE TABLET BY MOUTH EVERY MORNING ON AN EMPTY STOMACH 30 Tablet 5    furosemide (LASIX) 40 MG Tab Take 1 Tablet by mouth 2 times a day as needed (for swelling). 60 Tablet 11    Ascorbic Acid (VITAMIN C GUMMIE PO) Take 1,000 mg by mouth every day.      multivitamin-iron-minerals-folic acid (CENTRUM) chewable tablet Chew 2 Tablets every day.      [DISCONTINUED] losartan (COZAAR) 25 MG Tab Take 1 Tablet by mouth every day. 90 Tablet 3     No facility-administered encounter medications on file as of 2/23/2023.     Review of Systems   Constitutional: Negative.  Negative for chills, fever and malaise/fatigue.   HENT: Negative.  Negative for sore throat.    Eyes: Negative.    Respiratory: Negative.  Negative for cough, hemoptysis, sputum production, shortness of breath, wheezing and stridor.    Cardiovascular: Negative.  Negative for chest pain, palpitations, orthopnea, claudication, leg swelling and PND.   Gastrointestinal: Negative.    Genitourinary: Negative.    Musculoskeletal: Negative.    Skin: Negative.    Neurological: Negative.  Negative for dizziness, loss of consciousness and weakness.   Endo/Heme/Allergies: Negative.  Does not bruise/bleed easily.        Breast pain   All other systems reviewed and are negative.           Objective     /82 (BP Location: Left arm, Patient Position: Sitting, BP Cuff Size: Adult)   Pulse 86   Resp 16   Ht 1.829 m (6')   Wt 121 kg (266 lb)   SpO2 96%   BMI 36.08 kg/m²     Physical Exam  Vitals and nursing note reviewed.   Constitutional:       General: He is not in acute distress.     Appearance: He is well-developed. He is not  diaphoretic.   HENT:      Head: Normocephalic and atraumatic.      Right Ear: External ear normal.      Left Ear: External ear normal.      Nose: Nose normal.      Mouth/Throat:      Pharynx: No oropharyngeal exudate.   Eyes:      General: No scleral icterus.        Right eye: No discharge.         Left eye: No discharge.      Conjunctiva/sclera: Conjunctivae normal.      Pupils: Pupils are equal, round, and reactive to light.   Neck:      Vascular: No JVD.   Cardiovascular:      Rate and Rhythm: Normal rate and regular rhythm.      Heart sounds: No murmur heard.    No friction rub. No gallop.   Pulmonary:      Effort: Pulmonary effort is normal. No respiratory distress.      Breath sounds: No stridor. No wheezing or rales.   Chest:      Chest wall: No tenderness.   Abdominal:      General: There is no distension.      Palpations: Abdomen is soft.      Tenderness: There is no guarding.   Musculoskeletal:         General: No tenderness or deformity. Normal range of motion.      Cervical back: Neck supple.   Skin:     General: Skin is warm and dry.      Coloration: Skin is not pale.      Findings: No erythema or rash.   Neurological:      Mental Status: He is alert.      Cranial Nerves: No cranial nerve deficit.      Motor: No abnormal muscle tone.      Coordination: Coordination normal.      Deep Tendon Reflexes: Reflexes are normal and symmetric. Reflexes normal.   Psychiatric:         Behavior: Behavior normal.         Thought Content: Thought content normal.         Judgment: Judgment normal.          Echocardiogram: Dated 9/17/2021 personally viewed under myself showing normal LV systolic function no valvular heart disease.    Echocardiogram: Dated 5/14/2021 personally reviewed and interpreted by myself showing an EF of 35% with no valvular heart disease.    Cardiac catheterization: Dated 5/17/2021 personally viewed inter myself showing no ischemic coronary disease.    Assessment & Plan     1. Acute systolic  heart failure (HCC)        2. Paroxysmal atrial fibrillation (HCC)        3. Ventricular fibrillation (HCC)        4. Prolonged Q-T interval on ECG        5. Ventricular tachyarrhythmia        6. MANSOOR (obstructive sleep apnea)        7. ICD (implantable cardioverter-defibrillator), dual, in situ Medtronic Chrome placed 5/18/21        8. High risk medication use        9. ACC/AHA stage C systolic heart failure (HCC)        10. Essential hypertension, benign            Medical Decision Making: Today's Assessment/Status/Plan:        52-year-old male with heart failure with reduced ejection fraction alcohol abuse now alcohol free with a now normalized heart function.  At this point he self discontinued his losartan metoprolol.  We will simply watchfully wait his A-fib.  We will see him back in 6 months.  His EF is normalized we do not need to do any further testing.  We did discuss the risks of stopping medications but he is otherwise doing well.

## 2023-02-27 ENCOUNTER — APPOINTMENT (OUTPATIENT)
Dept: ENDOCRINOLOGY | Facility: MEDICAL CENTER | Age: 53
End: 2023-02-27
Attending: INTERNAL MEDICINE
Payer: COMMERCIAL

## 2023-05-12 ENCOUNTER — TELEPHONE (OUTPATIENT)
Dept: ENDOCRINOLOGY | Facility: MEDICAL CENTER | Age: 53
End: 2023-05-12
Payer: COMMERCIAL

## 2023-05-12 DIAGNOSIS — E04.1 LEFT THYROID NODULE: ICD-10-CM

## 2023-05-12 NOTE — TELEPHONE ENCOUNTER
Patient called because he would to get  an  imaging order for an outside imaging lab since he says the cost of it within [facility] is too high.

## 2023-06-12 ENCOUNTER — APPOINTMENT (OUTPATIENT)
Dept: ENDOCRINOLOGY | Facility: MEDICAL CENTER | Age: 53
End: 2023-06-12
Attending: INTERNAL MEDICINE
Payer: COMMERCIAL

## 2023-06-13 ENCOUNTER — NON-PROVIDER VISIT (OUTPATIENT)
Dept: CARDIOLOGY | Facility: MEDICAL CENTER | Age: 53
End: 2023-06-13
Payer: COMMERCIAL

## 2023-06-13 PROCEDURE — 93295 DEV INTERROG REMOTE 1/2/MLT: CPT | Performed by: INTERNAL MEDICINE

## 2023-06-13 NOTE — CARDIAC REMOTE MONITOR - SCAN
Device transmission reviewed. Device demonstrated appropriate function.       Electronically Signed by: Colleen Armstrong M.D.    6/16/2023  2:13 PM

## 2023-08-03 ENCOUNTER — TELEPHONE (OUTPATIENT)
Dept: CARDIOLOGY | Facility: MEDICAL CENTER | Age: 53
End: 2023-08-03
Payer: COMMERCIAL

## 2023-09-14 ENCOUNTER — HOSPITAL ENCOUNTER (OUTPATIENT)
Dept: RADIOLOGY | Facility: MEDICAL CENTER | Age: 53
End: 2023-09-14
Attending: INTERNAL MEDICINE
Payer: COMMERCIAL

## 2023-09-14 DIAGNOSIS — E04.1 LEFT THYROID NODULE: ICD-10-CM

## 2023-09-14 PROCEDURE — 76536 US EXAM OF HEAD AND NECK: CPT

## 2023-11-01 ENCOUNTER — TELEPHONE (OUTPATIENT)
Dept: ENDOCRINOLOGY | Facility: MEDICAL CENTER | Age: 53
End: 2023-11-01
Payer: COMMERCIAL

## 2023-11-01 DIAGNOSIS — E04.1 LEFT THYROID NODULE: ICD-10-CM

## 2023-11-01 DIAGNOSIS — E55.9 VITAMIN D DEFICIENCY: ICD-10-CM

## 2023-11-01 DIAGNOSIS — E03.8 SUBCLINICAL HYPOTHYROIDISM: ICD-10-CM

## 2023-11-01 RX ORDER — LEVOTHYROXINE SODIUM 50 MCG
50 TABLET ORAL
Qty: 30 TABLET | Refills: 1 | Status: SHIPPED | OUTPATIENT
Start: 2023-11-01

## 2023-11-01 NOTE — TELEPHONE ENCOUNTER
Pt called to follow up with [employer] message he sent since he has not received a response yet.    Refills have been requested for the following medications:         SYNTHROID 50 MCG Tab [Physician Luther Mercado M.D.]     Preferred pharmacy: [id number] - [place], [place] - [address] [facility]. [address]  Delivery method: Pickup    Thank you

## 2023-11-02 ENCOUNTER — HOSPITAL ENCOUNTER (OUTPATIENT)
Dept: LAB | Facility: MEDICAL CENTER | Age: 53
End: 2023-11-02
Attending: INTERNAL MEDICINE
Payer: COMMERCIAL

## 2023-11-02 DIAGNOSIS — E55.9 VITAMIN D DEFICIENCY: ICD-10-CM

## 2023-11-02 DIAGNOSIS — E03.8 SUBCLINICAL HYPOTHYROIDISM: ICD-10-CM

## 2023-11-02 DIAGNOSIS — E04.1 LEFT THYROID NODULE: ICD-10-CM

## 2023-11-02 LAB
25(OH)D3 SERPL-MCNC: 36 NG/ML (ref 30–100)
ALBUMIN SERPL BCP-MCNC: 4.4 G/DL (ref 3.2–4.9)
ALBUMIN/GLOB SERPL: 1.6 G/DL
ALP SERPL-CCNC: 99 U/L (ref 30–99)
ALT SERPL-CCNC: 25 U/L (ref 2–50)
ANION GAP SERPL CALC-SCNC: 8 MMOL/L (ref 7–16)
AST SERPL-CCNC: 23 U/L (ref 12–45)
BILIRUB SERPL-MCNC: 0.5 MG/DL (ref 0.1–1.5)
BUN SERPL-MCNC: 11 MG/DL (ref 8–22)
CALCIUM ALBUM COR SERPL-MCNC: 8.6 MG/DL (ref 8.5–10.5)
CALCIUM SERPL-MCNC: 8.9 MG/DL (ref 8.5–10.5)
CHLORIDE SERPL-SCNC: 105 MMOL/L (ref 96–112)
CO2 SERPL-SCNC: 27 MMOL/L (ref 20–33)
CREAT SERPL-MCNC: 0.85 MG/DL (ref 0.5–1.4)
GFR SERPLBLD CREATININE-BSD FMLA CKD-EPI: 105 ML/MIN/1.73 M 2
GLOBULIN SER CALC-MCNC: 2.7 G/DL (ref 1.9–3.5)
GLUCOSE SERPL-MCNC: 107 MG/DL (ref 65–99)
POTASSIUM SERPL-SCNC: 4.9 MMOL/L (ref 3.6–5.5)
PROT SERPL-MCNC: 7.1 G/DL (ref 6–8.2)
SODIUM SERPL-SCNC: 140 MMOL/L (ref 135–145)
T4 FREE SERPL-MCNC: 1.19 NG/DL (ref 0.93–1.7)
TSH SERPL DL<=0.005 MIU/L-ACNC: 1.18 UIU/ML (ref 0.38–5.33)

## 2023-11-02 PROCEDURE — 84439 ASSAY OF FREE THYROXINE: CPT

## 2023-11-02 PROCEDURE — 84443 ASSAY THYROID STIM HORMONE: CPT

## 2023-11-02 PROCEDURE — 36415 COLL VENOUS BLD VENIPUNCTURE: CPT

## 2023-11-02 PROCEDURE — 82306 VITAMIN D 25 HYDROXY: CPT

## 2023-11-02 PROCEDURE — 80053 COMPREHEN METABOLIC PANEL: CPT

## 2023-11-18 ENCOUNTER — HOSPITAL ENCOUNTER (OUTPATIENT)
Dept: LAB | Facility: MEDICAL CENTER | Age: 53
End: 2023-11-18
Attending: INTERNAL MEDICINE
Payer: COMMERCIAL

## 2023-11-18 LAB
ERYTHROCYTE [DISTWIDTH] IN BLOOD BY AUTOMATED COUNT: 49 FL (ref 35.9–50)
ESTRADIOL SERPL-MCNC: 32.6 PG/ML
FSH SERPL-ACNC: 4.9 MIU/ML (ref 1.5–12.4)
HCT VFR BLD AUTO: 47 % (ref 42–52)
HGB BLD-MCNC: 15.2 G/DL (ref 14–18)
LH SERPL-ACNC: 4.2 IU/L (ref 1.7–8.6)
MCH RBC QN AUTO: 30.4 PG (ref 27–33)
MCHC RBC AUTO-ENTMCNC: 32.3 G/DL (ref 32.3–36.5)
MCV RBC AUTO: 94 FL (ref 81.4–97.8)
PLATELET # BLD AUTO: 238 K/UL (ref 164–446)
PMV BLD AUTO: 9.6 FL (ref 9–12.9)
PROLACTIN SERPL-MCNC: 7.45 NG/ML (ref 2.1–17.7)
RBC # BLD AUTO: 5 M/UL (ref 4.7–6.1)
T3FREE SERPL-MCNC: 3.25 PG/ML (ref 2–4.4)
T4 FREE SERPL-MCNC: 1.15 NG/DL (ref 0.93–1.7)
TESTOST SERPL-MCNC: 719 NG/DL (ref 175–781)
THYROPEROXIDASE AB SERPL-ACNC: <9 IU/ML (ref 0–9)
TSH SERPL DL<=0.005 MIU/L-ACNC: 1.3 UIU/ML (ref 0.38–5.33)
VIT B12 SERPL-MCNC: 509 PG/ML (ref 211–911)
WBC # BLD AUTO: 5.8 K/UL (ref 4.8–10.8)

## 2023-11-18 PROCEDURE — 84403 ASSAY OF TOTAL TESTOSTERONE: CPT

## 2023-11-18 PROCEDURE — 36415 COLL VENOUS BLD VENIPUNCTURE: CPT

## 2023-11-18 PROCEDURE — 84153 ASSAY OF PSA TOTAL: CPT

## 2023-11-18 PROCEDURE — 82607 VITAMIN B-12: CPT

## 2023-11-18 PROCEDURE — 86376 MICROSOMAL ANTIBODY EACH: CPT

## 2023-11-18 PROCEDURE — 84154 ASSAY OF PSA FREE: CPT

## 2023-11-18 PROCEDURE — 84439 ASSAY OF FREE THYROXINE: CPT

## 2023-11-18 PROCEDURE — 84270 ASSAY OF SEX HORMONE GLOBUL: CPT

## 2023-11-18 PROCEDURE — 82670 ASSAY OF TOTAL ESTRADIOL: CPT

## 2023-11-18 PROCEDURE — 84443 ASSAY THYROID STIM HORMONE: CPT

## 2023-11-18 PROCEDURE — 83001 ASSAY OF GONADOTROPIN (FSH): CPT

## 2023-11-18 PROCEDURE — 83002 ASSAY OF GONADOTROPIN (LH): CPT

## 2023-11-18 PROCEDURE — 84481 FREE ASSAY (FT-3): CPT

## 2023-11-18 PROCEDURE — 84146 ASSAY OF PROLACTIN: CPT

## 2023-11-18 PROCEDURE — 85027 COMPLETE CBC AUTOMATED: CPT

## 2023-11-20 LAB
PSA FREE MFR SERPL: 18 %
PSA FREE SERPL-MCNC: 0.2 NG/ML
PSA SERPL-MCNC: 1.1 NG/ML (ref 0–4)
SHBG SERPL-SCNC: 53 NMOL/L (ref 19–76)

## 2023-12-30 ENCOUNTER — HOSPITAL ENCOUNTER (OUTPATIENT)
Dept: LAB | Facility: MEDICAL CENTER | Age: 53
End: 2023-12-30
Attending: INTERNAL MEDICINE
Payer: COMMERCIAL

## 2023-12-30 LAB
T3FREE SERPL-MCNC: 4.01 PG/ML (ref 2–4.4)
T4 FREE SERPL-MCNC: 1.24 NG/DL (ref 0.93–1.7)
TSH SERPL DL<=0.005 MIU/L-ACNC: 0.79 UIU/ML (ref 0.38–5.33)
VIT B12 SERPL-MCNC: 436 PG/ML (ref 211–911)

## 2023-12-30 PROCEDURE — 36415 COLL VENOUS BLD VENIPUNCTURE: CPT

## 2023-12-30 PROCEDURE — 84481 FREE ASSAY (FT-3): CPT

## 2023-12-30 PROCEDURE — 84443 ASSAY THYROID STIM HORMONE: CPT

## 2023-12-30 PROCEDURE — 82607 VITAMIN B-12: CPT

## 2023-12-30 PROCEDURE — 84439 ASSAY OF FREE THYROXINE: CPT

## 2024-03-13 ENCOUNTER — HOSPITAL ENCOUNTER (OUTPATIENT)
Dept: LAB | Facility: MEDICAL CENTER | Age: 54
End: 2024-03-13
Attending: INTERNAL MEDICINE
Payer: COMMERCIAL

## 2024-03-13 LAB
T3FREE SERPL-MCNC: 3.38 PG/ML (ref 2–4.4)
T4 FREE SERPL-MCNC: 1.02 NG/DL (ref 0.93–1.7)
TSH SERPL DL<=0.005 MIU/L-ACNC: 1.44 UIU/ML (ref 0.38–5.33)
VIT B12 SERPL-MCNC: 482 PG/ML (ref 211–911)

## 2024-03-13 PROCEDURE — 36415 COLL VENOUS BLD VENIPUNCTURE: CPT

## 2024-03-13 PROCEDURE — 84481 FREE ASSAY (FT-3): CPT

## 2024-03-13 PROCEDURE — 84439 ASSAY OF FREE THYROXINE: CPT

## 2024-03-13 PROCEDURE — 84443 ASSAY THYROID STIM HORMONE: CPT

## 2024-03-13 PROCEDURE — 82607 VITAMIN B-12: CPT

## 2024-03-16 ENCOUNTER — HOSPITAL ENCOUNTER (OUTPATIENT)
Dept: LAB | Facility: MEDICAL CENTER | Age: 54
End: 2024-03-16
Attending: NURSE PRACTITIONER
Payer: COMMERCIAL

## 2024-03-16 LAB
ALBUMIN SERPL BCP-MCNC: 4.3 G/DL (ref 3.2–4.9)
ALBUMIN/GLOB SERPL: 1.7 G/DL
ALP SERPL-CCNC: 86 U/L (ref 30–99)
ALT SERPL-CCNC: 24 U/L (ref 2–50)
ANION GAP SERPL CALC-SCNC: 8 MMOL/L (ref 7–16)
AST SERPL-CCNC: 30 U/L (ref 12–45)
BASOPHILS # BLD AUTO: 0.7 % (ref 0–1.8)
BASOPHILS # BLD: 0.04 K/UL (ref 0–0.12)
BILIRUB SERPL-MCNC: 0.4 MG/DL (ref 0.1–1.5)
BUN SERPL-MCNC: 12 MG/DL (ref 8–22)
CALCIUM ALBUM COR SERPL-MCNC: 8.7 MG/DL (ref 8.5–10.5)
CALCIUM SERPL-MCNC: 8.9 MG/DL (ref 8.5–10.5)
CHLORIDE SERPL-SCNC: 105 MMOL/L (ref 96–112)
CHOLEST SERPL-MCNC: 167 MG/DL (ref 100–199)
CO2 SERPL-SCNC: 25 MMOL/L (ref 20–33)
CREAT SERPL-MCNC: 0.83 MG/DL (ref 0.5–1.4)
EOSINOPHIL # BLD AUTO: 0.2 K/UL (ref 0–0.51)
EOSINOPHIL NFR BLD: 3.6 % (ref 0–6.9)
ERYTHROCYTE [DISTWIDTH] IN BLOOD BY AUTOMATED COUNT: 48.2 FL (ref 35.9–50)
FASTING STATUS PATIENT QL REPORTED: NORMAL
GFR SERPLBLD CREATININE-BSD FMLA CKD-EPI: 106 ML/MIN/1.73 M 2
GLOBULIN SER CALC-MCNC: 2.5 G/DL (ref 1.9–3.5)
GLUCOSE SERPL-MCNC: 104 MG/DL (ref 65–99)
HCT VFR BLD AUTO: 45.4 % (ref 42–52)
HDLC SERPL-MCNC: 40 MG/DL
HGB BLD-MCNC: 15.4 G/DL (ref 14–18)
IMM GRANULOCYTES # BLD AUTO: 0.02 K/UL (ref 0–0.11)
IMM GRANULOCYTES NFR BLD AUTO: 0.4 % (ref 0–0.9)
LDLC SERPL CALC-MCNC: 111 MG/DL
LYMPHOCYTES # BLD AUTO: 1.41 K/UL (ref 1–4.8)
LYMPHOCYTES NFR BLD: 25.4 % (ref 22–41)
MCH RBC QN AUTO: 31 PG (ref 27–33)
MCHC RBC AUTO-ENTMCNC: 33.9 G/DL (ref 32.3–36.5)
MCV RBC AUTO: 91.3 FL (ref 81.4–97.8)
MONOCYTES # BLD AUTO: 0.52 K/UL (ref 0–0.85)
MONOCYTES NFR BLD AUTO: 9.4 % (ref 0–13.4)
NEUTROPHILS # BLD AUTO: 3.36 K/UL (ref 1.82–7.42)
NEUTROPHILS NFR BLD: 60.5 % (ref 44–72)
NRBC # BLD AUTO: 0 K/UL
NRBC BLD-RTO: 0 /100 WBC (ref 0–0.2)
PLATELET # BLD AUTO: 212 K/UL (ref 164–446)
PMV BLD AUTO: 9.6 FL (ref 9–12.9)
POTASSIUM SERPL-SCNC: 4.6 MMOL/L (ref 3.6–5.5)
PROT SERPL-MCNC: 6.8 G/DL (ref 6–8.2)
PSA SERPL-MCNC: 1.09 NG/ML (ref 0–4)
RBC # BLD AUTO: 4.97 M/UL (ref 4.7–6.1)
SODIUM SERPL-SCNC: 138 MMOL/L (ref 135–145)
TRIGL SERPL-MCNC: 80 MG/DL (ref 0–149)
WBC # BLD AUTO: 5.6 K/UL (ref 4.8–10.8)

## 2024-03-16 PROCEDURE — 80061 LIPID PANEL: CPT

## 2024-03-16 PROCEDURE — 36415 COLL VENOUS BLD VENIPUNCTURE: CPT

## 2024-03-16 PROCEDURE — 80053 COMPREHEN METABOLIC PANEL: CPT

## 2024-03-16 PROCEDURE — 85025 COMPLETE CBC W/AUTO DIFF WBC: CPT

## 2024-03-16 PROCEDURE — 84153 ASSAY OF PSA TOTAL: CPT

## 2024-06-05 ENCOUNTER — HOSPITAL ENCOUNTER (OUTPATIENT)
Dept: LAB | Facility: MEDICAL CENTER | Age: 54
End: 2024-06-05
Attending: INTERNAL MEDICINE
Payer: COMMERCIAL

## 2024-06-05 LAB
T3FREE SERPL-MCNC: 3.45 PG/ML (ref 2–4.4)
T4 FREE SERPL-MCNC: 1.13 NG/DL (ref 0.93–1.7)
TSH SERPL DL<=0.005 MIU/L-ACNC: 1.62 UIU/ML (ref 0.38–5.33)

## 2024-06-05 PROCEDURE — 84443 ASSAY THYROID STIM HORMONE: CPT

## 2024-06-05 PROCEDURE — 84481 FREE ASSAY (FT-3): CPT

## 2024-06-05 PROCEDURE — 84439 ASSAY OF FREE THYROXINE: CPT

## 2024-06-05 PROCEDURE — 36415 COLL VENOUS BLD VENIPUNCTURE: CPT

## 2024-09-16 ENCOUNTER — HOSPITAL ENCOUNTER (OUTPATIENT)
Dept: LAB | Facility: MEDICAL CENTER | Age: 54
End: 2024-09-16
Attending: INTERNAL MEDICINE
Payer: COMMERCIAL

## 2024-09-16 LAB
T3FREE SERPL-MCNC: 3.61 PG/ML (ref 2–4.4)
T4 FREE SERPL-MCNC: 1.21 NG/DL (ref 0.93–1.7)
TSH SERPL-ACNC: 0.78 UIU/ML (ref 0.35–5.5)

## 2024-09-16 PROCEDURE — 84443 ASSAY THYROID STIM HORMONE: CPT

## 2024-09-16 PROCEDURE — 36415 COLL VENOUS BLD VENIPUNCTURE: CPT

## 2024-09-16 PROCEDURE — 84481 FREE ASSAY (FT-3): CPT

## 2024-09-16 PROCEDURE — 84439 ASSAY OF FREE THYROXINE: CPT

## 2024-10-15 ENCOUNTER — OFFICE VISIT (OUTPATIENT)
Dept: URGENT CARE | Facility: PHYSICIAN GROUP | Age: 54
End: 2024-10-15
Payer: COMMERCIAL

## 2024-10-15 VITALS
TEMPERATURE: 98.5 F | HEART RATE: 68 BPM | BODY MASS INDEX: 37.25 KG/M2 | HEIGHT: 72 IN | DIASTOLIC BLOOD PRESSURE: 72 MMHG | OXYGEN SATURATION: 99 % | SYSTOLIC BLOOD PRESSURE: 128 MMHG | RESPIRATION RATE: 14 BRPM | WEIGHT: 275 LBS

## 2024-10-15 DIAGNOSIS — S91.331A PENETRATING WOUND OF RIGHT FOOT, INITIAL ENCOUNTER: ICD-10-CM

## 2024-10-15 PROCEDURE — 3074F SYST BP LT 130 MM HG: CPT

## 2024-10-15 PROCEDURE — 99213 OFFICE O/P EST LOW 20 MIN: CPT | Mod: 25

## 2024-10-15 PROCEDURE — 90471 IMMUNIZATION ADMIN: CPT

## 2024-10-15 PROCEDURE — 90715 TDAP VACCINE 7 YRS/> IM: CPT

## 2024-10-15 PROCEDURE — 3078F DIAST BP <80 MM HG: CPT

## 2024-10-15 RX ORDER — ERGOCALCIFEROL 1.25 MG/1
1 CAPSULE ORAL
COMMUNITY

## 2024-10-15 RX ORDER — SULFAMETHOXAZOLE AND TRIMETHOPRIM 800; 160 MG/1; MG/1
1 TABLET ORAL 2 TIMES DAILY
Qty: 6 TABLET | Refills: 0 | Status: SHIPPED | OUTPATIENT
Start: 2024-10-15 | End: 2024-10-18

## 2024-10-15 ASSESSMENT — ENCOUNTER SYMPTOMS
CHILLS: 0
FEVER: 0

## 2024-10-15 ASSESSMENT — FIBROSIS 4 INDEX: FIB4 SCORE: 1.5

## 2024-12-13 ENCOUNTER — HOSPITAL ENCOUNTER (OUTPATIENT)
Dept: LAB | Facility: MEDICAL CENTER | Age: 54
End: 2024-12-13
Attending: UROLOGY
Payer: COMMERCIAL

## 2024-12-13 ENCOUNTER — HOSPITAL ENCOUNTER (OUTPATIENT)
Dept: LAB | Facility: MEDICAL CENTER | Age: 54
End: 2024-12-13
Attending: INTERNAL MEDICINE
Payer: COMMERCIAL

## 2024-12-13 PROCEDURE — 36415 COLL VENOUS BLD VENIPUNCTURE: CPT

## 2024-12-13 PROCEDURE — 84153 ASSAY OF PSA TOTAL: CPT

## 2024-12-13 PROCEDURE — 82306 VITAMIN D 25 HYDROXY: CPT

## 2024-12-13 PROCEDURE — 84154 ASSAY OF PSA FREE: CPT

## 2024-12-13 PROCEDURE — 84439 ASSAY OF FREE THYROXINE: CPT

## 2024-12-13 PROCEDURE — 84443 ASSAY THYROID STIM HORMONE: CPT

## 2024-12-13 PROCEDURE — 82607 VITAMIN B-12: CPT

## 2024-12-13 PROCEDURE — 84481 FREE ASSAY (FT-3): CPT

## 2024-12-14 LAB
25(OH)D3 SERPL-MCNC: 32 NG/ML (ref 30–100)
T3FREE SERPL-MCNC: 3.32 PG/ML (ref 2–4.4)
T4 FREE SERPL-MCNC: 1.28 NG/DL (ref 0.93–1.7)
TSH SERPL-ACNC: 0.98 UIU/ML (ref 0.35–5.5)
VIT B12 SERPL-MCNC: 397 PG/ML (ref 211–911)

## 2024-12-16 LAB
PSA FREE MFR SERPL: 17 %
PSA FREE SERPL-MCNC: 0.2 NG/ML
PSA SERPL-MCNC: 1.2 NG/ML (ref 0–4)

## 2024-12-28 ENCOUNTER — OFFICE VISIT (OUTPATIENT)
Dept: URGENT CARE | Facility: PHYSICIAN GROUP | Age: 54
End: 2024-12-28
Payer: COMMERCIAL

## 2024-12-28 VITALS
WEIGHT: 260 LBS | SYSTOLIC BLOOD PRESSURE: 102 MMHG | HEIGHT: 72 IN | RESPIRATION RATE: 16 BRPM | TEMPERATURE: 97.8 F | BODY MASS INDEX: 35.21 KG/M2 | DIASTOLIC BLOOD PRESSURE: 64 MMHG | HEART RATE: 74 BPM | OXYGEN SATURATION: 94 %

## 2024-12-28 DIAGNOSIS — S16.1XXA ACUTE CERVICAL MYOFASCIAL STRAIN, INITIAL ENCOUNTER: ICD-10-CM

## 2024-12-28 PROCEDURE — 99213 OFFICE O/P EST LOW 20 MIN: CPT

## 2024-12-28 PROCEDURE — 3074F SYST BP LT 130 MM HG: CPT

## 2024-12-28 PROCEDURE — 3078F DIAST BP <80 MM HG: CPT

## 2024-12-28 RX ORDER — BENZONATATE 100 MG/1
CAPSULE ORAL
COMMUNITY

## 2024-12-28 RX ORDER — SULFAMETHOXAZOLE AND TRIMETHOPRIM 800; 160 MG/1; MG/1
TABLET ORAL
COMMUNITY

## 2024-12-28 RX ORDER — CYCLOBENZAPRINE HCL 5 MG
5-10 TABLET ORAL
Qty: 15 TABLET | Refills: 0 | Status: SHIPPED | OUTPATIENT
Start: 2024-12-28

## 2024-12-28 RX ORDER — MELOXICAM 15 MG/1
15 TABLET ORAL DAILY
Qty: 30 TABLET | Refills: 0 | Status: SHIPPED | OUTPATIENT
Start: 2024-12-28

## 2024-12-28 ASSESSMENT — ENCOUNTER SYMPTOMS
FALLS: 0
HEADACHES: 0
VOMITING: 0
BACK PAIN: 0
COUGH: 0
MYALGIAS: 0
NECK PAIN: 1
CHILLS: 0
NAUSEA: 0
SORE THROAT: 0
DIARRHEA: 0
FEVER: 0

## 2024-12-28 ASSESSMENT — FIBROSIS 4 INDEX: FIB4 SCORE: 1.5

## 2024-12-28 NOTE — PROGRESS NOTES
Subjective:   Ross Blake is a 54 y.o. male who presents for Neck Pain (Neck pain radiating down to right shoulder x1 week )      Patient presents with complaints of right-sided posterior neck pain that radiates to trapezius and lateral deltoid.  Patient states symptoms started approximately 1 week ago.  He denies any fall, trauma or injury states that happened to notice after waking up.  Patient states he has been trying to take as needed Tylenol and ibuprofen as well as lidocaine and IcyHot patches does have minimal relief.  Denies any previous injuries to the area.  He denies any numbness tingling or radiation to his right upper extremity and no weakness.  Patient does notice that when he is driving pain is worse, though when he is working in his garden and doing activities pain is less and are not present.  Patient denies any focal neurological symptoms    Neck Pain   This is a new problem. The current episode started in the past 7 days. The problem has been unchanged. Pertinent negatives include no chest pain, fever or headaches.       Review of Systems   Constitutional:  Negative for chills and fever.   HENT:  Negative for congestion and sore throat.    Respiratory:  Negative for cough.    Cardiovascular:  Negative for chest pain.   Gastrointestinal:  Negative for diarrhea, nausea and vomiting.   Musculoskeletal:  Positive for joint pain and neck pain. Negative for back pain, falls and myalgias.   Skin:  Negative for rash.   Neurological:  Negative for headaches.     Refer to HPI for additional details.    During this visit, appropriate PPE was worn, and hand hygiene was performed.    PMH:  has a past medical history of Acute systolic heart failure (HCC) (2021), AICD/Pacemaker, Arrhythmia, Disorder of thyroid, Hypertension, ICD (implantable cardioverter-defibrillator) in place, Myocardial infarct (HCC) (06/2021), Seizure ([facility]) (08/2021), Sleep apnea, and Snoring.    He has no past medical history of  Acute nasopharyngitis, Anesthesia, Anginal syndrome (HCC), Arthritis, Asthma, Blood clotting disorder (HCC), Bowel habit changes, Breath shortness, Bronchitis, Cancer (HCC), Carcinoma in situ of respiratory system, Cataract, Congestive heart failure (HCC), Continuous ambulatory peritoneal dialysis status (HCC), Coughing blood, Dental disorder, Diabetes (HCC), Dialysis patient (HCC), Emphysema of lung (HCC), Glaucoma, Gynecological disorder, Heart burn, Heart murmur, Heart valve disease, Hemorrhagic disorder (HCC), Hepatitis A, Hepatitis B, Hepatitis C, Hiatus hernia syndrome, High cholesterol, Indigestion, Infectious disease, Jaundice, Pain, Pneumonia, Pregnant, Psychiatric problem, Renal disorder, Rheumatic fever, Stroke (HCC), Tuberculosis, Urinary bladder disorder, or Urinary incontinence.    MEDS:   Current Outpatient Medications:     cyclobenzaprine (FLEXERIL) 5 mg tablet, Take 1-2 Tablets by mouth at bedtime as needed for Muscle Spasms., Disp: 15 Tablet, Rfl: 0    diclofenac sodium (VOLTAREN) 1 % Gel, Apply 2 g topically 1 time a day as needed (pain) for up to 14 days., Disp: 100 g, Rfl: 0    meloxicam (MOBIC) 15 MG tablet, Take 1 Tablet by mouth every day., Disp: 30 Tablet, Rfl: 0    SYNTHROID 50 MCG Tab, Take 1 Tablet by mouth every morning on an empty stomach. Please make an appointment for any future refills, Disp: 30 Tablet, Rfl: 1    benzonatate (TESSALON) 100 MG Cap, , Disp: , Rfl:     sulfamethoxazole-trimethoprim (BACTRIM DS) 800-160 MG tablet, , Disp: , Rfl:     ergocalciferol (DRISDOL) 62738 UNIT capsule, 1 Capsule. (Patient not taking: Reported on 12/28/2024), Disp: , Rfl:     furosemide (LASIX) 40 MG Tab, Take 1 Tablet by mouth 2 times a day as needed (for swelling). (Patient not taking: Reported on 12/28/2024), Disp: 60 Tablet, Rfl: 11    Ascorbic Acid (VITAMIN C GUMMIE PO), Take 1,000 mg by mouth every day. (Patient not taking: Reported on 10/15/2024), Disp: , Rfl:      multivitamin-iron-minerals-folic acid (CENTRUM) chewable tablet, Chew 2 Tablets every day. (Patient not taking: Reported on 12/28/2024), Disp: , Rfl:     ALLERGIES:   Allergies   Allergen Reactions    Amiodarone Unspecified     TSH elevated    Eplerenone Unspecified     Gynecomastia     SURGHX:   Past Surgical History:   Procedure Laterality Date    MS UPPER GI ENDOSCOPY,DIAGNOSIS N/A 12/30/2022    Procedure: ESOPHAGOGASTRODUODENOSCOPY WITH CYST GASTROSTOMY STENT REVISION;  Surgeon: Geoff Rodrigez M.D.;  Location: [facility];  Service: Gastroenterology    MS EGD ENDOSCOPIC STENT PLACE N/A 12/30/2022    Procedure: GASTROSCOPY;  Surgeon: Geoff Rodrigez M.D.;  Location: [facility];  Service: Gastroenterology    MS UPPER GI ENDOSCOPY,DIAGNOSIS N/A 11/30/2022    Procedure: UPPER RADIAL ENDOSCOPIC ULTRASOUND WITH PANCREAS SYMPTOMATIC PSEUDOCYST WITH FINE NEEDLE ASPIRATION WITH STENT;  Surgeon: Geoff Rodrigez M.D.;  Location: [facility];  Service: EUS    EGD W/ENDOSCOPIC ULTRASOUND N/A 11/30/2022    Procedure: EGD, WITH ENDOSCOPIC US;  Surgeon: Geoff Rodrigez M.D.;  Location: [facility];  Service: EUS    MS UPPER GI ENDOSCOPY,DIAGNOSIS N/A 10/17/2022    Procedure: UPPER RADIAL ENDOSCOPIC ULTRASOUND,  PANCREAS SYMPTOMATIC PSEUDOCYST WITH FINE NEEDLE ASPIRATION;  Surgeon: Geoff Rodrigez M.D.;  Location: [facility];  Service: EUS    EGD W/ENDOSCOPIC ULTRASOUND N/A 10/17/2022    Procedure: EGD, WITH ENDOSCOPIC US;  Surgeon: Geoff Rodrigez M.D.;  Location: [facility];  Service: EUS    EGD WITH ASP/BX  10/17/2022    Procedure: EGD, WITH ASPIRATION BIOPSY;  Surgeon: Geoff Rodrigez M.D.;  Location: [facility];  Service: EUS    OTHER  09/2022    Throid biopsy    CRANIOTOMY  08/2021    Pt fell    PACEMAKER INSERTION  07/2021    Pacemaker/AICD placed    VEIN STRIPPING Bilateral 1992    vein ablation x2 2003,  2018    OTHER CARDIAC SURGERY  June 2021     SOCHX:  reports that he quit smoking about 4 years ago. His smoking use included cigarettes. He started smoking about 15 years ago. He has a 5 pack-year smoking history. He has never used smokeless tobacco. He reports that he does not currently use alcohol. He reports that he does not currently use drugs after having used the following drugs: Inhaled.    FH: Per HPI as applicable/pertinent.    Medications, Allergies, and current problem list reviewed today in Epic.     Objective:     /64 (BP Location: Right arm, Patient Position: Sitting, BP Cuff Size: Large adult)   Pulse 74   Temp 36.6 °C (97.8 °F) (Temporal)   Resp 16   Ht 1.829 m (6')   Wt 118 kg (260 lb)   SpO2 94%     Physical Exam  Vitals reviewed.   Constitutional:       General: He is not in acute distress.     Appearance: Normal appearance. He is not ill-appearing.   HENT:      Head: Normocephalic and atraumatic.   Musculoskeletal:      Cervical back: Spasms and tenderness present. No swelling, edema, deformity, erythema, signs of trauma, lacerations, rigidity, torticollis, bony tenderness or crepitus. Normal range of motion.      Thoracic back: Normal.      Lumbar back: Normal.        Back:       Comments: Mild tenderness and soreness to palpation of the above marked area.  Area is tight though without any obvious deformity, injury, or bony malalignment   Neurological:      Mental Status: He is alert.         Assessment/Plan:     Diagnosis and associated orders:     1. Acute cervical myofascial strain, initial encounter  - cyclobenzaprine (FLEXERIL) 5 mg tablet; Take 1-2 Tablets by mouth at bedtime as needed for Muscle Spasms.  Dispense: 15 Tablet; Refill: 0  - diclofenac sodium (VOLTAREN) 1 % Gel; Apply 2 g topically 1 time a day as needed (pain) for up to 14 days.  Dispense: 100 g; Refill: 0  - meloxicam (MOBIC) 15 MG tablet; Take 1 Tablet by mouth every day.  Dispense: 30 Tablet; Refill:  0    Other orders  - benzonatate (TESSALON) 100 MG Cap;  (Patient not taking: Reported on 12/28/2024)  - sulfamethoxazole-trimethoprim (BACTRIM DS) 800-160 MG tablet;  (Patient not taking: Reported on 12/28/2024)     Comments/MDM:     Patient history and physical exam consistent with acute myofascial cervical strain.  Patient denies any red flag symptoms and no red flag conditions seen on physical exam.  Overall presents well, hide he suspicion for musculoskeletal sprain at this point given patient's well presentation.  Will focus treatment on treating symptoms and gentle movement/resistance with proper body mechanics  Outpatient management will consist of good posture while at work, proper body mechanics and positioning while doing strenuous activity, meloxicam daily do not use any other NSAIDs and take with food, diclofenac gel to the affected area, as needed cyclobenzaprine to help with muscle spasms.  Follow up in 3-5 days if no improvement in symptoms         Differential diagnosis, natural history, supportive care, and indications for immediate follow-up discussed.    Advised the patient to follow-up with the primary care physician for recheck, reevaluation, and consideration of further management.    Please note that this dictation was created using voice recognition software. I have made a reasonable attempt to correct obvious errors, but I expect that there are errors of grammar and possibly content that I did not discover before finalizing the note.    This note was electronically signed by BOWEN Dawson

## 2025-04-09 ENCOUNTER — HOSPITAL ENCOUNTER (OUTPATIENT)
Dept: LAB | Facility: MEDICAL CENTER | Age: 55
End: 2025-04-09
Attending: INTERNAL MEDICINE
Payer: COMMERCIAL

## 2025-04-09 LAB
25(OH)D3 SERPL-MCNC: 45 NG/ML (ref 30–100)
T3FREE SERPL-MCNC: 2.97 PG/ML (ref 2–4.4)
T4 FREE SERPL-MCNC: 1.14 NG/DL (ref 0.93–1.7)
TSH SERPL-ACNC: 2.09 UIU/ML (ref 0.38–5.33)
VIT B12 SERPL-MCNC: 388 PG/ML (ref 211–911)

## 2025-04-09 PROCEDURE — 84439 ASSAY OF FREE THYROXINE: CPT

## 2025-04-09 PROCEDURE — 84443 ASSAY THYROID STIM HORMONE: CPT

## 2025-04-09 PROCEDURE — 82607 VITAMIN B-12: CPT

## 2025-04-09 PROCEDURE — 36415 COLL VENOUS BLD VENIPUNCTURE: CPT

## 2025-04-09 PROCEDURE — 84481 FREE ASSAY (FT-3): CPT

## 2025-04-09 PROCEDURE — 82306 VITAMIN D 25 HYDROXY: CPT

## 2025-04-10 ENCOUNTER — APPOINTMENT (OUTPATIENT)
Dept: LAB | Facility: MEDICAL CENTER | Age: 55
End: 2025-04-10
Payer: COMMERCIAL

## 2025-08-26 ENCOUNTER — HOSPITAL ENCOUNTER (OUTPATIENT)
Dept: LAB | Facility: MEDICAL CENTER | Age: 55
End: 2025-08-26
Attending: INTERNAL MEDICINE
Payer: COMMERCIAL

## 2025-08-26 LAB
T3FREE SERPL-MCNC: 3.15 PG/ML (ref 2–4.4)
T4 FREE SERPL-MCNC: 1.35 NG/DL (ref 0.93–1.7)
TSH SERPL-ACNC: 1.8 UIU/ML (ref 0.38–5.33)

## 2025-08-26 PROCEDURE — 36415 COLL VENOUS BLD VENIPUNCTURE: CPT

## 2025-08-26 PROCEDURE — 84439 ASSAY OF FREE THYROXINE: CPT

## 2025-08-26 PROCEDURE — 84443 ASSAY THYROID STIM HORMONE: CPT

## 2025-08-26 PROCEDURE — 84481 FREE ASSAY (FT-3): CPT

## (undated) DEVICE — TUBE E-T HI-LO CUFF 8.0MM (10EA/PK)

## (undated) DEVICE — SENSOR OXIMETER ADULT SPO2 RD SET (20EA/BX)

## (undated) DEVICE — LACTATED RINGERS INJ 1000 ML - (14EA/CA 60CA/PF)

## (undated) DEVICE — SOD. CHL. INJ. 0.9% 1000 ML - (14EA/CA 60CA/PF)

## (undated) DEVICE — ELECTRODE DUAL RETURN W/ CORD - (50/PK)

## (undated) DEVICE — BITE BLOCK ADULT 60FR (100EA/CA)

## (undated) DEVICE — CATHETER IV SAFETY 20 GA X 1-1/4 (50/BX)

## (undated) DEVICE — CUFF BP ADULT LARGE DISPOSABLE (20EA/CA)

## (undated) DEVICE — TOWEL STOP TIMEOUT SAFETY FLAG (40EA/CA)

## (undated) DEVICE — TUBING CLEARLINK DUO-VENT - C-FLO (48EA/CA)

## (undated) DEVICE — GOWN SURGEONS LARGE - (32/CA)

## (undated) DEVICE — SYRINGE SAFETY 5 ML 18 GA X 1-1/2 BLUNT LL (100/BX 4BX/CA)

## (undated) DEVICE — SYRINGE 12 CC LUER TIP - (80/BX) OBSOLETE ITEM

## (undated) DEVICE — BLOCK BITE ENDOSCOPIC 2809 - (100/BX) INTERMEDIATE

## (undated) DEVICE — GLOVE, LITE (PAIR)

## (undated) DEVICE — CAPTIVATOR II-25MM ROUND STIFF  (40/BX)

## (undated) DEVICE — CANISTER SUCTION RIGID RED 1500CC (40EA/CA)

## (undated) DEVICE — KIT CUSTOM PROCEDURE SINGLE FOR ENDO  (15/CA)

## (undated) DEVICE — TUBE SUCTION YANKAUER  1/4 X 6FT (20EA/CA)"

## (undated) DEVICE — SPONGE GAUZE NON-STERILE 4X4 - (2000/CA 10PK/CA)

## (undated) DEVICE — SYRINGE DISP. 60 CC LL - (30/BX, 12BX/CA)**WHEN THESE ARE GONE ORDER #500206**

## (undated) DEVICE — TUBE E-T HI-LO CUFF 6.5MM (10EA/BX)

## (undated) DEVICE — MASK AIRWAY SIZE 3 UNIQUE SILICON (10/BX)

## (undated) DEVICE — SYRINGE SAFETY 10 ML 18 GA X 1 1/2 BLUNT LL (100/BX 4BX/CA)

## (undated) DEVICE — WATER IRRIGATION STERILE 1000ML (12EA/CA)

## (undated) DEVICE — KIT  I.V. START (100EA/CA)

## (undated) DEVICE — SET LEADWIRE 5 LEAD BEDSIDE DISPOSABLE ECG (1SET OF 5/EA)

## (undated) DEVICE — SYRINGE SAFETY 3 ML 18 GA X 1 1/2 BLUNT LL (100/BX 8BX/CA)

## (undated) DEVICE — TUBE E-T HI-LO CUFF 8.5MM (10EA/PK)

## (undated) DEVICE — MASK WITH FACE SHIELD (25/BX 4BX/CA)

## (undated) DEVICE — FORCEP RADIAL JAW 4 STANDARD CAPACITY W/NEEDLE 240CM (40EA/BX)

## (undated) DEVICE — GUIDE JAGWIRE 035 STRAIGHT (2EA/BX)

## (undated) DEVICE — GLOVE, BIOGEL ECLIPSE, SZ 7.0, PF LTX (50/BX)

## (undated) DEVICE — NEEDLE 19GA FLEX EUS (5/BX)

## (undated) DEVICE — MASK  AIRWAY SIZE 5 UNIQUE SILICON (10EA/BX)

## (undated) DEVICE — TUBE E-T HI-LO CUFF 7.5MM (10EA/PK)

## (undated) DEVICE — MASK AIRWAY SIZE 4 UNIQUE SILICON (10EA/BX)

## (undated) DEVICE — TUBE CONNECTING SUCTION - CLEAR PLASTIC STERILE 72 IN (50EA/CA)

## (undated) DEVICE — GLOVE BIOGEL SZ 8 SURGICAL PF LTX - (50PR/BX 4BX/CA)

## (undated) DEVICE — SET EXTENSION WITH 2 PORTS (48EA/CA) ***PART #2C8610 IS A SUBSTITUTE*****

## (undated) DEVICE — MASK AIRWAY SIZE 2 LMA WITH LUBE & SYRINGE (10/BX)

## (undated) DEVICE — MASK O2 VNYL ADLT RBRTH HI - (50/CS)